# Patient Record
Sex: FEMALE | Race: WHITE | Employment: PART TIME | URBAN - METROPOLITAN AREA
[De-identification: names, ages, dates, MRNs, and addresses within clinical notes are randomized per-mention and may not be internally consistent; named-entity substitution may affect disease eponyms.]

---

## 2017-01-18 ENCOUNTER — IMMUNIZATION (OUTPATIENT)
Dept: FAMILY MEDICINE CLINIC | Facility: CLINIC | Age: 72
End: 2017-01-18

## 2017-01-18 DIAGNOSIS — Z23 NEED FOR VACCINATION: Primary | ICD-10-CM

## 2017-01-18 PROCEDURE — G0008 ADMIN INFLUENZA VIRUS VAC: HCPCS | Performed by: FAMILY MEDICINE

## 2017-01-18 PROCEDURE — 90662 IIV NO PRSV INCREASED AG IM: CPT | Performed by: FAMILY MEDICINE

## 2017-02-21 ENCOUNTER — OFFICE VISIT (OUTPATIENT)
Dept: FAMILY MEDICINE CLINIC | Facility: CLINIC | Age: 72
End: 2017-02-21

## 2017-02-21 VITALS
SYSTOLIC BLOOD PRESSURE: 122 MMHG | RESPIRATION RATE: 16 BRPM | HEART RATE: 81 BPM | BODY MASS INDEX: 22.49 KG/M2 | DIASTOLIC BLOOD PRESSURE: 76 MMHG | WEIGHT: 135 LBS | TEMPERATURE: 98 F | HEIGHT: 65 IN

## 2017-02-21 DIAGNOSIS — Z23 NEED FOR HEPATITIS A IMMUNIZATION: ICD-10-CM

## 2017-02-21 DIAGNOSIS — Z71.84 TRAVEL ADVICE ENCOUNTER: Primary | ICD-10-CM

## 2017-02-21 DIAGNOSIS — Z86.018 S/P EXCISION OF ACOUSTIC NEUROMA: ICD-10-CM

## 2017-02-21 DIAGNOSIS — Z98.890 S/P EXCISION OF ACOUSTIC NEUROMA: ICD-10-CM

## 2017-02-21 PROCEDURE — 90632 HEPA VACCINE ADULT IM: CPT | Performed by: FAMILY MEDICINE

## 2017-02-21 PROCEDURE — 99214 OFFICE O/P EST MOD 30 MIN: CPT | Performed by: FAMILY MEDICINE

## 2017-02-21 PROCEDURE — 90471 IMMUNIZATION ADMIN: CPT | Performed by: FAMILY MEDICINE

## 2017-02-21 RX ORDER — ONDANSETRON HYDROCHLORIDE 8 MG/1
8 TABLET, FILM COATED ORAL EVERY 8 HOURS PRN
Qty: 10 TABLET | Refills: 0 | Status: SHIPPED | OUTPATIENT
Start: 2017-02-21 | End: 2017-03-03

## 2017-02-21 RX ORDER — CIPROFLOXACIN 500 MG/1
500 TABLET, FILM COATED ORAL 2 TIMES DAILY
Qty: 20 TABLET | Refills: 0 | Status: SHIPPED | OUTPATIENT
Start: 2017-02-21 | End: 2017-03-03

## 2017-02-21 RX ORDER — DIPHENOXYLATE HYDROCHLORIDE AND ATROPINE SULFATE 2.5; .025 MG/1; MG/1
2 TABLET ORAL 4 TIMES DAILY PRN
Qty: 10 TABLET | Refills: 1 | Status: SHIPPED | OUTPATIENT
Start: 2017-02-21 | End: 2017-03-03

## 2017-02-21 NOTE — PROGRESS NOTES
Hoa Lyle is a 70year old female. HPI:   A week from tomorrow leaving for [place] with daughter and family and will be there for ten days. Feels well. Got Hep A shot number two as well today.  Wants to travel with antibiotics and antidiarrheals and Smoker                   Packs/Day: 0.00  Years:         Smokeless Status: Never Used                        Comment: quit 30 years ago    Alcohol Use: Yes                Comment: 1 x week        REVIEW OF SYSTEMS:   GENERAL HEALTH: feels well otherwise an

## 2017-02-26 NOTE — PATIENT INSTRUCTIONS
Gustine, you were seen for travel physical and medication for the trip to [place] as well as your second Hep A shot. Please be careful on the trip and bring your own medications as well as over the counter meds you will need.   You got your second Hep A sh · medicines to treat cancer  · steroid medicines like prednisone or cortisone  What if I miss a dose? This does not apply. Where should I keep my medicine? This drug is given in a hospital or clinic and will not be stored at home.   What should I tell my If you have a long ride ahead, buy snacks before the trip. This will help you avoid the high-fat food choices on the road.  Some good snack options include:  · Pre-washed fruit (apples, pears, bananas, or grapes)  · Whole-grain crackers or pretzels  · Whole The [place] virus is active in [place] and certain other parts of the world. If you travel to areas where [place] is currently active, you are at risk for a Zika infection.  There is no vaccine to prevent Zika infection, so you’ll need to take steps to prot · Buy permethrin. This is a type of insecticide you can apply to fabrics. If you are going camping, treat your clothing, shoes, and your tent with permethrin. You can also buy gear that’s already treated. Don’t use permethrin directly on your skin.   · Pack · Use condoms or don’t have sex for at least 6 months after your symptoms start if you are a man, or 8 weeks if you are a woman. · If your partner is pregnant, use protection every time you have vaginal, oral, or anal sex.  Or don’t have sex during the pre · You may use acetaminophen or ibuprofen to control fever, unless another medicine was prescribed. If you have chronic liver or kidney disease or have ever had a stomach ulcer or gastrointestinal  bleeding, talk with the doctor before using these medicines Call your healthcare provider if any of these occur:  · Severe constant pain in the lower part of the abdomen, on the right side  · Blood in diarrhea or vomit, dark coffee ground appearing vomit, or dark tarry stools  · continued vomiting (unable to keep l

## 2017-03-14 RX ORDER — ALENDRONATE SODIUM 35 MG/1
TABLET ORAL
Qty: 12 TABLET | Refills: 0 | Status: SHIPPED | OUTPATIENT
Start: 2017-03-14 | End: 2017-03-31

## 2017-03-14 RX ORDER — ATORVASTATIN CALCIUM 10 MG/1
TABLET, FILM COATED ORAL
Qty: 30 TABLET | Refills: 0 | Status: SHIPPED | OUTPATIENT
Start: 2017-03-14 | End: 2017-05-23

## 2017-03-14 RX ORDER — LEVOTHYROXINE SODIUM 88 UG/1
TABLET ORAL
Qty: 90 TABLET | Refills: 0 | Status: SHIPPED | OUTPATIENT
Start: 2017-03-14 | End: 2017-06-08

## 2017-03-16 ENCOUNTER — HOSPITAL ENCOUNTER (OUTPATIENT)
Dept: MAMMOGRAPHY | Facility: HOSPITAL | Age: 72
Discharge: HOME | End: 2017-03-16
Attending: INTERNAL MEDICINE
Payer: MEDICARE

## 2017-03-16 DIAGNOSIS — C50.911 MALIGNANT NEOPLASM OF RIGHT FEMALE BREAST, UNSPECIFIED SITE OF BREAST: ICD-10-CM

## 2017-03-16 PROCEDURE — 77062 BREAST TOMOSYNTHESIS BI: CPT

## 2017-03-16 PROCEDURE — 76642 ULTRASOUND BREAST LIMITED: CPT

## 2017-03-16 PROCEDURE — 77066 DX MAMMO INCL CAD BI: CPT

## 2017-03-31 ENCOUNTER — OFFICE VISIT (OUTPATIENT)
Dept: HEMATOLOGY/ONCOLOGY | Facility: HOSPITAL | Age: 72
End: 2017-03-31
Attending: INTERNAL MEDICINE
Payer: MEDICARE

## 2017-03-31 VITALS
SYSTOLIC BLOOD PRESSURE: 133 MMHG | WEIGHT: 134.5 LBS | HEART RATE: 61 BPM | RESPIRATION RATE: 18 BRPM | BODY MASS INDEX: 22.41 KG/M2 | DIASTOLIC BLOOD PRESSURE: 63 MMHG | HEIGHT: 65 IN | TEMPERATURE: 97 F | OXYGEN SATURATION: 97 %

## 2017-03-31 DIAGNOSIS — C50.911 MALIGNANT NEOPLASM OF RIGHT FEMALE BREAST, UNSPECIFIED SITE OF BREAST: Primary | ICD-10-CM

## 2017-03-31 PROCEDURE — 99213 OFFICE O/P EST LOW 20 MIN: CPT | Performed by: INTERNAL MEDICINE

## 2017-03-31 NOTE — PROGRESS NOTES
[facility] Progress Note      Patient Name:  Kellee Dumas  YOB: 1945  Medical Record Number:  [id number]    Encounter date: 3/31/2017    CHIEF COMPLAINT: Stage I right breast carcinoma status post lumpectomy     HPI:     The pa ONCE A WEEK IN THE MORNING WITH A FULL GLASS OF WATER, 30 MINUTES BEFORE A MEAL OR BEVERAGE. REMAIN UPRIGHT Disp: 12 tablet Rfl: 1   Multiple Minerals-Vitamins Oral Chew Tab Chew 1 tablet by mouth daily.  Disp:  Rfl:    Cholecalciferol (VITAMIN D) 2000 UNI di Gamboa M.D.     THE [facility]    [address], [address]    3/31/2017

## 2017-03-31 NOTE — PROGRESS NOTES
Pt here for 1 year MD f/u for hx of RB ca. Mammogram one 3/6/17. States she feels well and has no concerns at this time.

## 2017-04-19 ENCOUNTER — TELEPHONE (OUTPATIENT)
Dept: FAMILY MEDICINE CLINIC | Facility: CLINIC | Age: 72
End: 2017-04-19

## 2017-04-19 NOTE — TELEPHONE ENCOUNTER
Received authorization for release of records for last three years from [employer] on behalf of [address] [place] [address]. Faxed to [employer] for processing.

## 2017-04-24 ENCOUNTER — TELEPHONE (OUTPATIENT)
Dept: FAMILY MEDICINE CLINIC | Facility: CLINIC | Age: 72
End: 2017-04-24

## 2017-04-24 DIAGNOSIS — R89.9 ABNORMAL LABORATORY TEST RESULT: Primary | ICD-10-CM

## 2017-04-24 NOTE — TELEPHONE ENCOUNTER
----- Message from Jess Zuluaga DO sent at 4/21/2017 12:13 PM CDT -----  Please call pt with normal results of all labs for lipids, CBC, thyroid labs but CMP with increase in CO2, protein, globulin in blood--she just got back from [place] recently I bel

## 2017-05-18 ENCOUNTER — OFFICE VISIT (OUTPATIENT)
Dept: FAMILY MEDICINE CLINIC | Facility: CLINIC | Age: 72
End: 2017-05-18

## 2017-05-18 VITALS
DIASTOLIC BLOOD PRESSURE: 78 MMHG | TEMPERATURE: 98 F | RESPIRATION RATE: 16 BRPM | HEIGHT: 65 IN | WEIGHT: 136 LBS | SYSTOLIC BLOOD PRESSURE: 128 MMHG | HEART RATE: 62 BPM | OXYGEN SATURATION: 99 % | BODY MASS INDEX: 22.66 KG/M2

## 2017-05-18 DIAGNOSIS — R77.9 ELEVATED BLOOD PROTEIN: ICD-10-CM

## 2017-05-18 DIAGNOSIS — I10 WELL-CONTROLLED HYPERTENSION: Primary | ICD-10-CM

## 2017-05-18 DIAGNOSIS — E03.9 ACQUIRED HYPOTHYROIDISM: ICD-10-CM

## 2017-05-18 DIAGNOSIS — Z86.018 S/P EXCISION OF ACOUSTIC NEUROMA: ICD-10-CM

## 2017-05-18 DIAGNOSIS — C50.911 MALIGNANT NEOPLASM OF RIGHT FEMALE BREAST, UNSPECIFIED ESTROGEN RECEPTOR STATUS, UNSPECIFIED SITE OF BREAST (HCC): Chronic | ICD-10-CM

## 2017-05-18 DIAGNOSIS — R77.1 ELEVATED SERUM GLOBULIN LEVEL: ICD-10-CM

## 2017-05-18 DIAGNOSIS — I35.0 NONRHEUMATIC AORTIC VALVE STENOSIS: ICD-10-CM

## 2017-05-18 DIAGNOSIS — Z98.890 S/P EXCISION OF ACOUSTIC NEUROMA: ICD-10-CM

## 2017-05-18 PROCEDURE — 99214 OFFICE O/P EST MOD 30 MIN: CPT | Performed by: FAMILY MEDICINE

## 2017-05-18 NOTE — PATIENT INSTRUCTIONS
High Blood Pressure, New, Begin Treatment  Your blood pressure was high enough today to start treatment with medicines. Often health care providers don’t know what causes high blood pressure (hypertension).  But it can be controlled with lifestyle changes · Don’t take medicines that have heart stimulants. This includes many cold and sinus decongestant pills and sprays, as well as diet pills. Check the warnings about hypertension on the label.  Stimulants such as amphetamine or cocaine could be lethal for len © 9287-2562 [address], [address] [place]. All rights reserved. This information is not intended as a substitute for professional medical care. Always follow your healthcare professional's instructions.

## 2017-05-18 NOTE — PROGRESS NOTES
HPI:   Kena Mercer is a 70year old female presents for recheck of her hypertension and hypothyroidism. Pt has been taking medications as instructed, no medication side effects, home BP monitoring in the range of 459'Z systolic and 88'E diastolic.   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 Labs from 4/20/17 also reviewed face to face.    Diet and exercise explained and enco

## 2017-05-23 RX ORDER — ATORVASTATIN CALCIUM 10 MG/1
TABLET, FILM COATED ORAL
Qty: 30 TABLET | Refills: 5 | Status: SHIPPED | OUTPATIENT
Start: 2017-05-23 | End: 2017-11-30

## 2017-05-30 ENCOUNTER — OFFICE VISIT (OUTPATIENT)
Dept: DERMATOLOGY CLINIC | Facility: CLINIC | Age: 72
End: 2017-05-30

## 2017-05-30 DIAGNOSIS — D23.30 BENIGN NEOPLASM OF SKIN OF FACE: ICD-10-CM

## 2017-05-30 DIAGNOSIS — D23.4 BENIGN NEOPLASM OF SCALP AND SKIN OF NECK: ICD-10-CM

## 2017-05-30 DIAGNOSIS — L82.1 SEBORRHEIC KERATOSES: ICD-10-CM

## 2017-05-30 DIAGNOSIS — D48.5 NEOPLASM OF UNCERTAIN BEHAVIOR OF SKIN: Primary | ICD-10-CM

## 2017-05-30 DIAGNOSIS — L57.8 SUN-DAMAGED SKIN: ICD-10-CM

## 2017-05-30 DIAGNOSIS — D23.70 BENIGN NEOPLASM OF SKIN OF LOWER LIMB, INCLUDING HIP, UNSPECIFIED LATERALITY: ICD-10-CM

## 2017-05-30 DIAGNOSIS — L81.4 SOLAR LENTIGO: ICD-10-CM

## 2017-05-30 DIAGNOSIS — Q82.5 CONGENITAL NEVUS OF LEFT THIGH: ICD-10-CM

## 2017-05-30 DIAGNOSIS — D23.60 BENIGN NEOPLASM OF SKIN OF UPPER LIMB, INCLUDING SHOULDER, UNSPECIFIED LATERALITY: ICD-10-CM

## 2017-05-30 PROCEDURE — 11100 BIOPSY OF SKIN LESION: CPT | Performed by: DERMATOLOGY

## 2017-05-30 PROCEDURE — 99202 OFFICE O/P NEW SF 15 MIN: CPT | Performed by: DERMATOLOGY

## 2017-05-30 PROCEDURE — 88305 TISSUE EXAM BY PATHOLOGIST: CPT | Performed by: DERMATOLOGY

## 2017-05-30 RX ORDER — FLUOCINONIDE 0.5 MG/G
OINTMENT TOPICAL
Qty: 60 G | Refills: 2 | Status: SHIPPED | OUTPATIENT
Start: 2017-05-30 | End: 2017-06-01

## 2017-05-30 NOTE — PROGRESS NOTES
HPI:     Chief Complaint     Derm Problem        HPI     Derm Problem    Additional comments: New patient presents for first full body check. Lesion to nose x few months. Bleeding when aggravated. No personal or family hx of skin ca.         Last edited by Diagnosis Date   • HYPERTENSION    • OTHER DISEASES      THROID DISORDERS   • OTHER DISEASES      EAR DISORDERS   • Personal history of colonic polyps 2007   • Other ill-defined conditions(799.89)      Estrogens   • Functional murmur    • Benign neoplasm • Ovarian Cancer Sister 64   • Breast Cancer Self 72       PHYSICAL EXAM:   Physical Exam  A complete body exam was performed, including face, neck, chest , back, abdomen, r upper extremity, l upper extremity, buttocks, genital area, l lower extremity an shoulder, unspecified laterality  Benign neoplasm of skin of lower limb, including hip, unspecified laterality      Orders Placed This Encounter  BIOPSY OF SKIN LESION  Specimen to Pathology, Tissue [IHP Pt to [facility] lab]    Results From Past 48 Hours:  No

## 2017-06-01 ENCOUNTER — TELEPHONE (OUTPATIENT)
Dept: DERMATOLOGY CLINIC | Facility: CLINIC | Age: 72
End: 2017-06-01

## 2017-06-05 NOTE — PROGRESS NOTES
Quick Note:    Pt returned telephone call. Pt informed per LS S that 5/30/2017 derm path report result did reveal a BCC to her left nasal rim. Pt informed that [address] is the most common and least worrisome type of skin cancer.  Pt informed because of the locat

## 2017-06-08 RX ORDER — LOSARTAN POTASSIUM AND HYDROCHLOROTHIAZIDE 12.5; 5 MG/1; MG/1
TABLET ORAL
Qty: 90 TABLET | Refills: 0 | Status: SHIPPED | OUTPATIENT
Start: 2017-06-08 | End: 2017-09-06

## 2017-06-08 RX ORDER — LEVOTHYROXINE SODIUM 88 UG/1
TABLET ORAL
Qty: 90 TABLET | Refills: 0 | Status: SHIPPED | OUTPATIENT
Start: 2017-06-08 | End: 2017-09-06

## 2017-06-08 NOTE — TELEPHONE ENCOUNTER
LMOM for patient to call office in regards to refill request received from pharmacy-no identifying information on voicemail.

## 2017-06-08 NOTE — TELEPHONE ENCOUNTER
Please call pt and let her know she has a pending CMP lab she was supposed to redo in April, medications Losartan and Levothyroxine were sent to the pharmacy.

## 2017-06-23 NOTE — PROGRESS NOTES
Quick Note:    Please call pt with stable CMP results and repeat as directed in 6 to 12 months--- Dr. Michelle Carl  ______

## 2017-06-28 ENCOUNTER — TELEPHONE (OUTPATIENT)
Dept: FAMILY MEDICINE CLINIC | Facility: CLINIC | Age: 72
End: 2017-06-28

## 2017-06-28 DIAGNOSIS — I10 WELL-CONTROLLED HYPERTENSION: Primary | ICD-10-CM

## 2017-06-28 NOTE — TELEPHONE ENCOUNTER
----- Message from [facility], DO sent at 6/23/2017  2:36 PM CDT -----  Please call pt with stable CMP results and repeat as directed in 6 to 12 months---  Dr. Mckenzie Sunny

## 2017-09-06 RX ORDER — LEVOTHYROXINE SODIUM 88 UG/1
TABLET ORAL
Qty: 90 TABLET | Refills: 0 | Status: SHIPPED | OUTPATIENT
Start: 2017-09-06 | End: 2017-11-30

## 2017-09-06 RX ORDER — LOSARTAN POTASSIUM AND HYDROCHLOROTHIAZIDE 12.5; 5 MG/1; MG/1
TABLET ORAL
Qty: 90 TABLET | Refills: 0 | Status: SHIPPED | OUTPATIENT
Start: 2017-09-06 | End: 2017-11-30

## 2017-09-21 ENCOUNTER — OFFICE VISIT (OUTPATIENT)
Dept: SURGERY | Facility: CLINIC | Age: 72
End: 2017-09-21

## 2017-09-21 DIAGNOSIS — D48.5 NEOPLASM OF UNCERTAIN BEHAVIOR OF SKIN: Primary | ICD-10-CM

## 2017-09-21 PROCEDURE — G0463 HOSPITAL OUTPT CLINIC VISIT: HCPCS | Performed by: PLASTIC SURGERY

## 2017-09-21 PROCEDURE — 99203 OFFICE O/P NEW LOW 30 MIN: CPT | Performed by: PLASTIC SURGERY

## 2017-09-21 RX ORDER — HYDROCODONE BITARTRATE AND ACETAMINOPHEN 7.5; 325 MG/1; MG/1
1 TABLET ORAL
Qty: 10 TABLET | Refills: 0 | Status: SHIPPED | OUTPATIENT
Start: 2017-09-21 | End: 2017-11-21

## 2017-09-21 NOTE — H&P
Kellee Dumas is a 70year old female that presents with Patient presents with:  Lesion: L leg  .     REFERRED BY:  Henny Santo    Pacemaker: No  Latex Allergy: no  Coumadin: No  Plavix: No  Other anticoagulants: No  Cardiac stents: No    HAND DOMINANCE Multiple Minerals-Vitamins Oral Chew Tab Chew 1 tablet by mouth daily. Disp:  Rfl:    Cholecalciferol (VITAMIN D) 2000 UNITS Oral Cap Take 4,000 Units by mouth daily. Disp:  Rfl:    Calcium 500 MG Oral Chew Tab Chew  by mouth daily.  Disp:  Rfl:    Cinda Flores 0.50 Packs/day  For 8.00 Years     Quit date: 9/21/1980    Smokeless tobacco: Never Used    Comment: quit 30 years ago    Alcohol use Yes    Comment: 3 X week    Drug use: No    Sexual activity: Not on file     Other Topics Concern    Caffeine Concern No treatment options. WIDE EXCISION: treatment requires wide excision. A scar will result, which will be permanent. The scar will be considerably longer than the size of the lesion, as we need to excise margins around the tumor.   Further excision may be

## 2017-09-21 NOTE — PROGRESS NOTES
Pt request for surgery signed by pt and witnessed and signed by RN. Prescription for Norco and narcotic hand-out instruction sheet given to and reviewed w/patient. Pre-Surgical Instruction Handout given to and reviewed w/pt.   All questions and concerns a

## 2017-11-09 ENCOUNTER — OFFICE VISIT (OUTPATIENT)
Dept: DERMATOLOGY CLINIC | Facility: CLINIC | Age: 72
End: 2017-11-09

## 2017-11-09 ENCOUNTER — TELEPHONE (OUTPATIENT)
Dept: SURGERY | Facility: CLINIC | Age: 72
End: 2017-11-09

## 2017-11-09 DIAGNOSIS — D48.5 NEOPLASM OF UNCERTAIN BEHAVIOR OF SKIN: Primary | ICD-10-CM

## 2017-11-09 DIAGNOSIS — Z85.828 PERSONAL HISTORY OF SKIN CANCER: Primary | ICD-10-CM

## 2017-11-09 DIAGNOSIS — D23.30 BENIGN NEOPLASM OF SKIN OF FACE: ICD-10-CM

## 2017-11-09 PROCEDURE — 99212 OFFICE O/P EST SF 10 MIN: CPT | Performed by: DERMATOLOGY

## 2017-11-09 PROCEDURE — G0463 HOSPITAL OUTPT CLINIC VISIT: HCPCS | Performed by: DERMATOLOGY

## 2017-11-09 NOTE — PROGRESS NOTES
HPI:     Chief Complaint     Derm Problem        HPI     Derm Problem    Additional comments: LOV 05/30/17 pt was seen for full body check pt had Bx of skin lesion to her nose done at the time pt here today for follow up on her nose [place]       Last edited b cell carcinoma of skin) 2017    left nasal rim   • Benign neoplasm of cranial nerves     Neurofibroma of the acoustic nerve   • Breast cancer ([facility] [address] 2011    Invasive ductal and lobular CA   • Disorder of thyroid    • Functional murmur    • High blood pressu Right Handed Yes    Currently spends a great deal of time in the sun No    Hx of Spending [place] [employer] of Time in [place] Yes    Bad sunburns in the past Yes    Tanning Salons in the Past No    Hx of Radiation Treatments Yes     Social History Narrative   None o

## 2017-11-09 NOTE — TELEPHONE ENCOUNTER
Pt is scheduled for surgery on 01/12/18  Insurance is Medicare/[employer] [employer] [place]    Thank you!

## 2017-11-23 NOTE — TELEPHONE ENCOUNTER
Please call pt with stable CMP results except for mildly elevated glucose and repeat cmp, Vit D levels in 6 mos with codes for hyperglycemia and low vit D and order please as directed---  Dr. Oren Dickersont

## 2017-11-28 DIAGNOSIS — R73.9 HYPERGLYCEMIA: ICD-10-CM

## 2017-11-28 DIAGNOSIS — E55.9 VITAMIN D DEFICIENCY: Primary | ICD-10-CM

## 2017-11-30 ENCOUNTER — OFFICE VISIT (OUTPATIENT)
Dept: FAMILY MEDICINE CLINIC | Facility: CLINIC | Age: 72
End: 2017-11-30

## 2017-11-30 VITALS
SYSTOLIC BLOOD PRESSURE: 122 MMHG | DIASTOLIC BLOOD PRESSURE: 70 MMHG | RESPIRATION RATE: 16 BRPM | OXYGEN SATURATION: 99 % | WEIGHT: 137 LBS | HEART RATE: 76 BPM | HEIGHT: 65 IN | BODY MASS INDEX: 22.82 KG/M2 | TEMPERATURE: 99 F

## 2017-11-30 DIAGNOSIS — Z76.0 MEDICATION REFILL: ICD-10-CM

## 2017-11-30 DIAGNOSIS — Z23 NEED FOR VACCINATION: ICD-10-CM

## 2017-11-30 DIAGNOSIS — Z00.00 ENCOUNTER FOR ANNUAL HEALTH EXAMINATION: Primary | ICD-10-CM

## 2017-11-30 DIAGNOSIS — Z71.82 EXERCISE COUNSELING: ICD-10-CM

## 2017-11-30 DIAGNOSIS — Z23 NEED FOR VACCINATION AGAINST STREPTOCOCCUS PNEUMONIAE: ICD-10-CM

## 2017-11-30 DIAGNOSIS — I10 WELL-CONTROLLED HYPERTENSION: ICD-10-CM

## 2017-11-30 DIAGNOSIS — E55.9 VITAMIN D DEFICIENCY: ICD-10-CM

## 2017-11-30 DIAGNOSIS — E03.9 ACQUIRED HYPOTHYROIDISM: ICD-10-CM

## 2017-11-30 DIAGNOSIS — M81.0 AGE RELATED OSTEOPOROSIS, UNSPECIFIED PATHOLOGICAL FRACTURE PRESENCE: ICD-10-CM

## 2017-11-30 DIAGNOSIS — Z71.3 DIETARY COUNSELING: ICD-10-CM

## 2017-11-30 DIAGNOSIS — Z98.890 S/P EXCISION OF ACOUSTIC NEUROMA: ICD-10-CM

## 2017-11-30 DIAGNOSIS — Z86.018 S/P EXCISION OF ACOUSTIC NEUROMA: ICD-10-CM

## 2017-11-30 DIAGNOSIS — E78.5 DYSLIPIDEMIA: ICD-10-CM

## 2017-11-30 PROCEDURE — G0009 ADMIN PNEUMOCOCCAL VACCINE: HCPCS | Performed by: FAMILY MEDICINE

## 2017-11-30 PROCEDURE — G0008 ADMIN INFLUENZA VIRUS VAC: HCPCS | Performed by: FAMILY MEDICINE

## 2017-11-30 PROCEDURE — 90653 IIV ADJUVANT VACCINE IM: CPT | Performed by: FAMILY MEDICINE

## 2017-11-30 PROCEDURE — 90670 PCV13 VACCINE IM: CPT | Performed by: FAMILY MEDICINE

## 2017-11-30 PROCEDURE — 99212 OFFICE O/P EST SF 10 MIN: CPT | Performed by: FAMILY MEDICINE

## 2017-11-30 PROCEDURE — G0439 PPPS, SUBSEQ VISIT: HCPCS | Performed by: FAMILY MEDICINE

## 2017-11-30 RX ORDER — ALENDRONATE SODIUM 35 MG/1
TABLET ORAL
Qty: 12 TABLET | Refills: 1 | Status: SHIPPED | OUTPATIENT
Start: 2017-11-30 | End: 2018-05-31 | Stop reason: ALTCHOICE

## 2017-11-30 RX ORDER — LOSARTAN POTASSIUM AND HYDROCHLOROTHIAZIDE 12.5; 5 MG/1; MG/1
TABLET ORAL
Qty: 90 TABLET | Refills: 1 | Status: SHIPPED | OUTPATIENT
Start: 2017-11-30 | End: 2018-05-31

## 2017-11-30 RX ORDER — LEVOTHYROXINE SODIUM 88 UG/1
TABLET ORAL
Qty: 90 TABLET | Refills: 1 | Status: SHIPPED | OUTPATIENT
Start: 2017-11-30 | End: 2018-05-31

## 2017-11-30 RX ORDER — ATORVASTATIN CALCIUM 10 MG/1
TABLET, FILM COATED ORAL
Qty: 90 TABLET | Refills: 1 | Status: SHIPPED | OUTPATIENT
Start: 2017-11-30 | End: 2018-05-31

## 2017-11-30 NOTE — PATIENT INSTRUCTIONS
Romina Delgado's SCREENING SCHEDULE   Tests on this list are recommended by your physician but may not be covered, or covered at this frequency, by your insurer. Please check with your insurance carrier before scheduling to verify coverage.    PREVENTATI cigarettes in their lifetime   • Anyone with a family history    Colorectal Cancer Screening  Covered up to Age 76     Colonoscopy Screen   Covered every 10 years- more often if abnormal Colonoscopy,5 Years due on 05/01/2017 Update Health Maintenance if ap ANTIG   Orders placed or performed in visit on 01/19/16  -INFLUENZA VIRUS VACCINE, PRESERV FREE, >=1YEARS OF AGE    Please get every year    Pneumococcal 13 (Prevnar)  Covered Once after 65 No orders found for this or any previous visit.  Please get once a information from the [address] [place] [address] regarding Advance Directives. Romina Delgado's SCREENING SCHEDULE   Tests on this list are recommended by your physician but may not be covered, or covered at this frequency, by your insurer.  Please ch annually for Diabetics, people with Glaucoma family history,   Americans over age 48   Americans over age 72 See eye doctor annually    Bone Density Screening     Bone density screening   Covered every 2 yrs after age 72    Covered yearly fo [employer]. http://[email or web address] [place]/public/books/advin.htm  A link to the [employer]. This site has a lot of good information including definitions of the different types of Advance Directives.  It als

## 2017-11-30 NOTE — PROGRESS NOTES
HPI:   Rossi Morales is a 67year old female who presents for a Medicare Subsequent Annual Wellness visit (Pt already had Initial Annual Wellness). Preventative questions reviewed with pt and recorded.   She sees dentist and eye doctors; healthy diet depressed, or hopeless (over the last two weeks)?: Not at all  PHQ-2 SCORE: 0     Advanced Directive:   She does have a Living Will but we do NOT have it on file in [address] [place] [address] Rd.    The patient has this document but we do not have it in [facility], and patient is instruc daily.   mupirocin 2 % External Ointment    LOSARTAN POTASSIUM-HCTZ 50-12.5 MG Oral Tab TAKE ONE TABLET BY MOUTH ONCE DAILY   LEVOTHYROXINE SODIUM 88 MCG Oral Tab TAKE ONE TABLET BY MOUTH ONCE DAILY BEFORE BREAKFAST   Fluocinonide 0.05 % External Cream Kit She has never used smokeless tobacco. She reports that she drinks alcohol. She reports that she does not use drugs.      REVIEW OF SYSTEMS:   A comprehensive review of systems was negative except for: Ears, nose, mouth, throat, and face: positive for histor Lymph nodes: Cervical, supraclavicular, and axillary nodes normal   Neurologic: Normal         Vaccination History   Immunization History   Administered Date(s) Administered   • >=9 YRS AFLURIA TRI PRESERV FREE SINGLE DOSE (44565) FLU CLINIC 01/19/2016 NIGHTLY  -     Alendronate Sodium 35 MG Oral Tab; TAKE ONE TABLET BY MOUTH ONCE A WEEK IN THE MORNING WITH A FULL GLASS OF WATER, 30 MINUTES BEFORE A MEAL OR BEVERAGE.   REMAIN UPRIGHT  -     Levothyroxine Sodium 88 MCG Oral Tab; TAKE ONE TABLET BY MOUTH ON ----------  GLUCOSE (mg/dL)   Date Value   11/21/2017 104 (H)   ----------       Cardiovascular Disease Screening     LDL Annually LDL Cholesterol Calc (mg/dL)   Date Value   02/27/2012 129 (H)     LDL-CHOLESTEROL (mg/dL (calc))   Date Value   04/20/2017 TDaP Not covered by Medicare Part B No vaccine history found This may be covered with your prescription benefits, but Medicare does not cover unless Medically needed    Zoster  Not covered by Medicare Part B No vaccine history found This may be covered wit

## 2018-01-04 ENCOUNTER — TELEPHONE (OUTPATIENT)
Dept: SURGERY | Facility: CLINIC | Age: 73
End: 2018-01-04

## 2018-01-04 RX ORDER — HYDROCODONE BITARTRATE AND ACETAMINOPHEN 7.5; 325 MG/1; MG/1
1 TABLET ORAL EVERY 6 HOURS PRN
Refills: 0 | COMMUNITY
Start: 2017-12-06 | End: 2018-02-05

## 2018-01-04 NOTE — TELEPHONE ENCOUNTER
Spoke with patient. All changes to medications and allergies, per patient report, have been documented in the medical record.      Patient  has not been ill, hospitalized, or had any surgical procedures since last seen in our office on 9/21/17    Dr. Adal Santos

## 2018-01-04 NOTE — H&P
H&P UPDATE:      Pacemaker: No  Latex Allergy: no  Coumadin: No  Plavix: No  Other anticoagulants: No  Cardiac stents: No    HAND DOMINANCE:  Right  Profession: retired    RECONSTRUCTIVE HISTORY    SUN EXPOSURE   Current no   Past yes   Sunburns yes   Tann Neurofibroma of the acoustic nerve   • Breast cancer ([facility] [address] 2011    Invasive ductal and lobular CA   • Disorder of thyroid    • Functional murmur    • High blood pressure    • HYPERTENSION    • Osteopenia    • OTHER DISEASES     THROID DISORDERS   • OTHER Hx of Spending [place] Time in [place] Yes    Bad sunburns in the past Yes    Tanning Salons in the Past No    Hx of Radiation Treatments Yes     Social History Narrative   None on file     Family History   Problem Relation Age of Onset   • Cancer [place] Other anticoagulants: No  Cardiac stents: No     HAND DOMINANCE:      Right  Profession: retired     RECONSTRUCTIVE HISTORY     SUN EXPOSURE                Current no                Past       yes                Sunburns yes                Tanning salons c ALENDRONATE SODIUM 35 MG Oral Tab TAKE ONE TABLET BY MOUTH ONCE A WEEK IN THE MORNING WITH A FULL GLASS OF WATER, 30 MINUTES BEFORE A MEAL OR BEVERAGE.   REMAIN UPRIGHT Disp: 12 tablet Rfl: 1   Multiple Minerals-Vitamins Oral Chew Tab Chew 1 tablet by mouth Social History      Social History  Social History    Marital status:     Spouse name: N/A     Years of education: N/A   Number of children: N/A      Occupational History  None on file      Social History Main Topics                             Claudia Left foot dorsalis pedis 1+ posterior tibial absent     Left distal lateral thigh 5 x 3 cm macular light and dark brown speckled irregularly bordered non-ulcerated lesion.   Moderate lax skin.     ASSESSMENT/PLAN:      IMPRESSION:     SUSPICIOUS LESION  PER Orders Placed      None   Medication Changes         Hydrocodone-Acetaminophen 7.5-325 MG 1 tablet Oral EVERY 4 TO 6 HOURS PRN      Medication List   Visit Diagnoses         Neoplasm of uncertain behavior of skin D48.5      Problem List

## 2018-01-12 ENCOUNTER — HOSPITAL DOCUMENTATION (OUTPATIENT)
Dept: SURGERY | Facility: CLINIC | Age: 73
End: 2018-01-12

## 2018-01-12 ENCOUNTER — ANESTHESIA (OUTPATIENT)
Dept: SURGERY | Facility: HOSPITAL | Age: 73
End: 2018-01-12
Payer: MEDICARE

## 2018-01-12 ENCOUNTER — HOSPITAL ENCOUNTER (OUTPATIENT)
Facility: HOSPITAL | Age: 73
Setting detail: HOSPITAL OUTPATIENT SURGERY
Discharge: HOME | End: 2018-01-12
Attending: PLASTIC SURGERY | Admitting: PLASTIC SURGERY
Payer: MEDICARE

## 2018-01-12 ENCOUNTER — ANESTHESIA EVENT (OUTPATIENT)
Dept: SURGERY | Facility: HOSPITAL | Age: 73
End: 2018-01-12
Payer: MEDICARE

## 2018-01-12 ENCOUNTER — SURGERY (OUTPATIENT)
Age: 73
End: 2018-01-12

## 2018-01-12 VITALS
SYSTOLIC BLOOD PRESSURE: 131 MMHG | HEART RATE: 53 BPM | WEIGHT: 136 LBS | RESPIRATION RATE: 16 BRPM | DIASTOLIC BLOOD PRESSURE: 64 MMHG | HEIGHT: 65 IN | TEMPERATURE: 97 F | BODY MASS INDEX: 22.66 KG/M2 | OXYGEN SATURATION: 96 %

## 2018-01-12 DIAGNOSIS — D48.5 NEOPLASM OF UNCERTAIN BEHAVIOR OF SKIN: ICD-10-CM

## 2018-01-12 DIAGNOSIS — Q82.5 CONGENITAL NEVUS: ICD-10-CM

## 2018-01-12 DIAGNOSIS — Q82.5 CONGENITAL NEVUS OF LEFT THIGH: Primary | ICD-10-CM

## 2018-01-12 DIAGNOSIS — D48.5 NEOPLASM OF UNCERTAIN BEHAVIOR OF SKIN: Primary | ICD-10-CM

## 2018-01-12 PROCEDURE — 14302 TIS TRNFR ADDL 30 SQ CM: CPT | Performed by: PLASTIC SURGERY

## 2018-01-12 PROCEDURE — 0HXJXZZ TRANSFER LEFT UPPER LEG SKIN, EXTERNAL APPROACH: ICD-10-PCS | Performed by: PLASTIC SURGERY

## 2018-01-12 PROCEDURE — 0HBJXZZ EXCISION OF LEFT UPPER LEG SKIN, EXTERNAL APPROACH: ICD-10-PCS | Performed by: PLASTIC SURGERY

## 2018-01-12 PROCEDURE — 14301 TIS TRNFR ANY 30.1-60 SQ CM: CPT | Performed by: PLASTIC SURGERY

## 2018-01-12 RX ORDER — GLYCOPYRROLATE 0.2 MG/ML
INJECTION, SOLUTION INTRAMUSCULAR; INTRAVENOUS AS NEEDED
Status: DISCONTINUED | OUTPATIENT
Start: 2018-01-12 | End: 2018-01-12 | Stop reason: SURG

## 2018-01-12 RX ORDER — FAMOTIDINE 20 MG/1
20 TABLET ORAL ONCE
Status: DISCONTINUED | OUTPATIENT
Start: 2018-01-12 | End: 2018-01-12 | Stop reason: HOSPADM

## 2018-01-12 RX ORDER — ONDANSETRON 2 MG/ML
4 INJECTION INTRAMUSCULAR; INTRAVENOUS ONCE AS NEEDED
Status: DISCONTINUED | OUTPATIENT
Start: 2018-01-12 | End: 2018-01-12

## 2018-01-12 RX ORDER — HYDROCODONE BITARTRATE AND ACETAMINOPHEN 5; 325 MG/1; MG/1
2 TABLET ORAL AS NEEDED
Status: DISCONTINUED | OUTPATIENT
Start: 2018-01-12 | End: 2018-01-12

## 2018-01-12 RX ORDER — MORPHINE SULFATE 2 MG/ML
2 INJECTION, SOLUTION INTRAMUSCULAR; INTRAVENOUS EVERY 10 MIN PRN
Status: DISCONTINUED | OUTPATIENT
Start: 2018-01-12 | End: 2018-01-12

## 2018-01-12 RX ORDER — ONDANSETRON 2 MG/ML
INJECTION INTRAMUSCULAR; INTRAVENOUS AS NEEDED
Status: DISCONTINUED | OUTPATIENT
Start: 2018-01-12 | End: 2018-01-12 | Stop reason: SURG

## 2018-01-12 RX ORDER — NALOXONE HYDROCHLORIDE 0.4 MG/ML
80 INJECTION, SOLUTION INTRAMUSCULAR; INTRAVENOUS; SUBCUTANEOUS AS NEEDED
Status: DISCONTINUED | OUTPATIENT
Start: 2018-01-12 | End: 2018-01-12

## 2018-01-12 RX ORDER — KETOROLAC TROMETHAMINE 15 MG/ML
INJECTION, SOLUTION INTRAMUSCULAR; INTRAVENOUS AS NEEDED
Status: DISCONTINUED | OUTPATIENT
Start: 2018-01-12 | End: 2018-01-12 | Stop reason: SURG

## 2018-01-12 RX ORDER — LIDOCAINE HYDROCHLORIDE 10 MG/ML
INJECTION, SOLUTION EPIDURAL; INFILTRATION; INTRACAUDAL; PERINEURAL AS NEEDED
Status: DISCONTINUED | OUTPATIENT
Start: 2018-01-12 | End: 2018-01-12 | Stop reason: SURG

## 2018-01-12 RX ORDER — HYDROMORPHONE HYDROCHLORIDE 1 MG/ML
0.2 INJECTION, SOLUTION INTRAMUSCULAR; INTRAVENOUS; SUBCUTANEOUS EVERY 5 MIN PRN
Status: DISCONTINUED | OUTPATIENT
Start: 2018-01-12 | End: 2018-01-12

## 2018-01-12 RX ORDER — HYDROMORPHONE HYDROCHLORIDE 1 MG/ML
0.6 INJECTION, SOLUTION INTRAMUSCULAR; INTRAVENOUS; SUBCUTANEOUS EVERY 5 MIN PRN
Status: DISCONTINUED | OUTPATIENT
Start: 2018-01-12 | End: 2018-01-12

## 2018-01-12 RX ORDER — ACETAMINOPHEN 500 MG
1000 TABLET ORAL ONCE
Status: COMPLETED | OUTPATIENT
Start: 2018-01-12 | End: 2018-01-12

## 2018-01-12 RX ORDER — HYDROMORPHONE HYDROCHLORIDE 1 MG/ML
0.4 INJECTION, SOLUTION INTRAMUSCULAR; INTRAVENOUS; SUBCUTANEOUS EVERY 5 MIN PRN
Status: DISCONTINUED | OUTPATIENT
Start: 2018-01-12 | End: 2018-01-12

## 2018-01-12 RX ORDER — METOPROLOL TARTRATE 5 MG/5ML
2.5 INJECTION INTRAVENOUS ONCE
Status: DISCONTINUED | OUTPATIENT
Start: 2018-01-12 | End: 2018-01-12

## 2018-01-12 RX ORDER — MORPHINE SULFATE 10 MG/ML
6 INJECTION, SOLUTION INTRAMUSCULAR; INTRAVENOUS EVERY 10 MIN PRN
Status: DISCONTINUED | OUTPATIENT
Start: 2018-01-12 | End: 2018-01-12

## 2018-01-12 RX ORDER — MORPHINE SULFATE 4 MG/ML
4 INJECTION, SOLUTION INTRAMUSCULAR; INTRAVENOUS EVERY 10 MIN PRN
Status: DISCONTINUED | OUTPATIENT
Start: 2018-01-12 | End: 2018-01-12

## 2018-01-12 RX ORDER — HALOPERIDOL 5 MG/ML
0.25 INJECTION INTRAMUSCULAR ONCE AS NEEDED
Status: DISCONTINUED | OUTPATIENT
Start: 2018-01-12 | End: 2018-01-12

## 2018-01-12 RX ORDER — METOCLOPRAMIDE 10 MG/1
10 TABLET ORAL ONCE
Status: DISCONTINUED | OUTPATIENT
Start: 2018-01-12 | End: 2018-01-12 | Stop reason: HOSPADM

## 2018-01-12 RX ORDER — DEXAMETHASONE SODIUM PHOSPHATE 4 MG/ML
VIAL (ML) INJECTION AS NEEDED
Status: DISCONTINUED | OUTPATIENT
Start: 2018-01-12 | End: 2018-01-12 | Stop reason: SURG

## 2018-01-12 RX ORDER — SODIUM CHLORIDE, SODIUM LACTATE, POTASSIUM CHLORIDE, CALCIUM CHLORIDE 600; 310; 30; 20 MG/100ML; MG/100ML; MG/100ML; MG/100ML
INJECTION, SOLUTION INTRAVENOUS CONTINUOUS
Status: DISCONTINUED | OUTPATIENT
Start: 2018-01-12 | End: 2018-01-12

## 2018-01-12 RX ORDER — HYDROCODONE BITARTRATE AND ACETAMINOPHEN 7.5; 325 MG/1; MG/1
1 TABLET ORAL EVERY 4 HOURS PRN
Status: DISCONTINUED | OUTPATIENT
Start: 2018-01-12 | End: 2018-01-12

## 2018-01-12 RX ORDER — HYDROCODONE BITARTRATE AND ACETAMINOPHEN 5; 325 MG/1; MG/1
1 TABLET ORAL AS NEEDED
Status: DISCONTINUED | OUTPATIENT
Start: 2018-01-12 | End: 2018-01-12

## 2018-01-12 RX ORDER — LIDOCAINE HYDROCHLORIDE AND EPINEPHRINE 5; 5 MG/ML; UG/ML
INJECTION, SOLUTION INFILTRATION; PERINEURAL AS NEEDED
Status: DISCONTINUED | OUTPATIENT
Start: 2018-01-12 | End: 2018-01-12 | Stop reason: HOSPADM

## 2018-01-12 RX ADMIN — SODIUM CHLORIDE, SODIUM LACTATE, POTASSIUM CHLORIDE, CALCIUM CHLORIDE: 600; 310; 30; 20 INJECTION, SOLUTION INTRAVENOUS at 07:30:00

## 2018-01-12 RX ADMIN — GLYCOPYRROLATE 0.2 MG: 0.2 INJECTION, SOLUTION INTRAMUSCULAR; INTRAVENOUS at 07:30:00

## 2018-01-12 RX ADMIN — KETOROLAC TROMETHAMINE 15 MG: 15 INJECTION, SOLUTION INTRAMUSCULAR; INTRAVENOUS at 09:02:00

## 2018-01-12 RX ADMIN — SODIUM CHLORIDE, SODIUM LACTATE, POTASSIUM CHLORIDE, CALCIUM CHLORIDE: 600; 310; 30; 20 INJECTION, SOLUTION INTRAVENOUS at 09:16:00

## 2018-01-12 RX ADMIN — LIDOCAINE HYDROCHLORIDE 50 MG: 10 INJECTION, SOLUTION EPIDURAL; INFILTRATION; INTRACAUDAL; PERINEURAL at 07:35:00

## 2018-01-12 RX ADMIN — DEXAMETHASONE SODIUM PHOSPHATE 4 MG: 4 MG/ML VIAL (ML) INJECTION at 07:35:00

## 2018-01-12 RX ADMIN — ONDANSETRON 4 MG: 2 INJECTION INTRAMUSCULAR; INTRAVENOUS at 07:35:00

## 2018-01-12 NOTE — INTERVAL H&P NOTE
Pre-op Diagnosis: Congenital nexus     The above referenced H&P was reviewed by Carmine Franz MD on 1/12/2018, the patient was examined and no significant changes have occurred in the patient's condition since the H&P was performed.   I discussed w

## 2018-01-12 NOTE — BRIEF OP NOTE
Pre-Operative Diagnosis: Congenital nexus      Post-Operative Diagnosis: Congenital nexus      Procedure Performed:   Procedure(s):   Wide excision lesion(s) left thigh flap reconstruction     Surgeon(s) and Role:     * Shelby Koenig MD - Zeyad

## 2018-01-12 NOTE — ANESTHESIA POSTPROCEDURE EVALUATION
Patient: Kena Mercer    Procedure Summary     Date:  01/12/18 Room / Location:  [address] [place] [address] MAIN OR 01 / [facility] MAIN OR    Anesthesia Start:  0730 Anesthesia Stop:  8842    Procedure:  EXCISION LESION LOWER EXTREMITY (Left Leg Upper) Diagnosis:  (Congenital nexus

## 2018-01-12 NOTE — ANESTHESIA PREPROCEDURE EVALUATION
Anesthesia PreOp Note    HPI:     Jazmin Aguirre is a 67year old female who presents for preoperative consultation requested by: Skye Sutherland MD    Date of Surgery: 1/12/2018    Procedure(s):  EXCISION LESION LOWER EXTREMITY  Indication: Congen Comment: IDC  1990's: MARCO NEEDLE LOCALIZATION W/ SPECIMEN 1 SITE LEFT  3/2011: NEEDLE BIOPSY RIGHT Right      Comment: Invasive ductal and lobular  2/2008: OTHER SURGICAL HISTORY      Comment: ACCUSTIC NEUROMA, Craniotomy excision  2/22/2011: OTHER SURGICA 1/10/2018   Cholecalciferol (VITAMIN D) 2000 UNITS Oral Cap Take 4,000 Units by mouth daily. Disp:  Rfl:  1/10/2018   Calcium 500 MG Oral Chew Tab Chew  by mouth daily.  Disp:  Rfl:  1/10/2018   hydrocodone-acetaminophen 7.5-325 MG Oral Tab Take 1 tablet by Former Smoker  0.50 Packs/day  For 8.00 Years     Quit date: 9/21/1980    Smokeless tobacco: Never Used    Comment: quit 30 years ago    Alcohol use Yes    Comment: occasional    Drug use: No    Sexual activity: Not on file     Other Topics Concern    Caff General  Airway:  LMA  Post-op Pain Management: IV analgesics  Plan Comments: DC  Informed Consent Plan and Risks Discussed With:  Patient  Use of Blood Products Discussed With:  Patient  Discussed plan with:  Surgeon and CRNA      I have informed Deena

## 2018-01-12 NOTE — H&P (VIEW-ONLY)
H&P UPDATE:      Pacemaker: No  Latex Allergy: no  Coumadin: No  Plavix: No  Other anticoagulants: No  Cardiac stents: No    HAND DOMINANCE:  Right  Profession: retired    RECONSTRUCTIVE HISTORY    SUN EXPOSURE   Current no   Past yes   Sunburns yes   Tann Neurofibroma of the acoustic nerve   • Breast cancer ([facility] [address] 2011    Invasive ductal and lobular CA   • Disorder of thyroid    • Functional murmur    • High blood pressure    • HYPERTENSION    • Osteopenia    • OTHER DISEASES     THROID DISORDERS   • OTHER Hx of Spending [place] [employer] of Time in [place] Yes    Bad sunburns in the past Yes    Tanning Salons in the Past No    Hx of Radiation Treatments Yes     Social History Narrative   None on file     Family History   Problem Relation Age of Onset   • Cancer [place] Other anticoagulants: No  Cardiac stents: No     HAND DOMINANCE:      Right  Profession: retired     RECONSTRUCTIVE HISTORY     SUN EXPOSURE                Current no                Past       yes                Sunburns yes                Tanning salons c ALENDRONATE SODIUM 35 MG Oral Tab TAKE ONE TABLET BY MOUTH ONCE A WEEK IN THE MORNING WITH A FULL GLASS OF WATER, 30 MINUTES BEFORE A MEAL OR BEVERAGE.   REMAIN UPRIGHT Disp: 12 tablet Rfl: 1   Multiple Minerals-Vitamins Oral Chew Tab Chew 1 tablet by mouth Social History      Social History  Social History    Marital status:     Spouse name: N/A     Years of education: N/A   Number of children: N/A      Occupational History  None on file      Social History Main Topics                             Claudia Left foot dorsalis pedis 1+ posterior tibial absent     Left distal lateral thigh 5 x 3 cm macular light and dark brown speckled irregularly bordered non-ulcerated lesion.   Moderate lax skin.     ASSESSMENT/PLAN:      IMPRESSION:     SUSPICIOUS LESION  PER Orders Placed      None   Medication Changes         Hydrocodone-Acetaminophen 7.5-325 MG 1 tablet Oral EVERY 4 TO 6 HOURS PRN      Medication List   Visit Diagnoses         Neoplasm of uncertain behavior of skin D48.5      Problem List

## 2018-01-13 ENCOUNTER — TELEPHONE (OUTPATIENT)
Dept: SURGERY | Facility: CLINIC | Age: 73
End: 2018-01-13

## 2018-01-14 NOTE — TELEPHONE ENCOUNTER
Spoke w/pt who states that she is \"doing good\", denies any complaints. Pt reminded that she can shower w/the steri-strip just not to soak it in the shower. Pt verbalized an understanding.   Pt reminded of appt w/RN on 01/23 for suture removal and post-o

## 2018-01-15 NOTE — OPERATIVE REPORT
[facility]    PATIENT'S NAME: Mya Hernandez   ATTENDING PHYSICIAN: Elo Sanchez MD   OPERATING PHYSICIAN: Elo Sanchez MD   PATIENT ACCOUNT#:   [id number]    LOCATION:  [address]  MEDICAL RECORD #:   [id number] distally to effect tension-free closure of the defect. Total defect 167 sq cm; primary defect 24+, secondary defect 143. Subcutaneous and deep dermal sutures of 2-0 Vicryl were placed. Skin edges were reapproximated with 4-0 nylon.   Steri-Strips were ap

## 2018-01-23 ENCOUNTER — NURSE ONLY (OUTPATIENT)
Dept: SURGERY | Facility: CLINIC | Age: 73
End: 2018-01-23

## 2018-01-23 DIAGNOSIS — Z48.02 ENCOUNTER FOR REMOVAL OF SUTURES: Primary | ICD-10-CM

## 2018-01-23 DIAGNOSIS — D48.5 NEOPLASM OF UNCERTAIN BEHAVIOR OF SKIN: ICD-10-CM

## 2018-01-23 PROCEDURE — G0463 HOSPITAL OUTPT CLINIC VISIT: HCPCS | Performed by: PLASTIC SURGERY

## 2018-01-23 NOTE — PROGRESS NOTES
Surgery 1: L thigh congenital nevus / flap  - Date: 18  - Days Since: 11  Pt is in the office today for Nurse visit for L thigh suture removal.  Pt identified for name and . Orders reviewed. Denies pain. Not taking analgesics.   Slight pruritis

## 2018-01-25 ENCOUNTER — TELEPHONE (OUTPATIENT)
Dept: SURGERY | Facility: CLINIC | Age: 73
End: 2018-01-25

## 2018-01-25 NOTE — TELEPHONE ENCOUNTER
Called pt per Dr. Gabrielle Wayne' request and informed pt that the lesion that was surgically removed showed very early pre-cancerous changes and that it was completely removed and no further surgery needed. Pt verbalized an understanding.   Pt instructed to c

## 2018-02-01 ENCOUNTER — TELEPHONE (OUTPATIENT)
Dept: HEMATOLOGY/ONCOLOGY | Facility: HOSPITAL | Age: 73
End: 2018-02-01

## 2018-02-01 DIAGNOSIS — C50.911 MALIGNANT NEOPLASM OF RIGHT FEMALE BREAST, UNSPECIFIED ESTROGEN RECEPTOR STATUS, UNSPECIFIED SITE OF BREAST (HCC): Primary | ICD-10-CM

## 2018-02-05 ENCOUNTER — OFFICE VISIT (OUTPATIENT)
Dept: SURGERY | Facility: CLINIC | Age: 73
End: 2018-02-05

## 2018-02-05 DIAGNOSIS — D48.5 NEOPLASM OF UNCERTAIN BEHAVIOR OF SKIN: Primary | ICD-10-CM

## 2018-02-05 PROCEDURE — G0463 HOSPITAL OUTPT CLINIC VISIT: HCPCS | Performed by: PLASTIC SURGERY

## 2018-02-05 PROCEDURE — 99024 POSTOP FOLLOW-UP VISIT: CPT | Performed by: PLASTIC SURGERY

## 2018-02-05 NOTE — PROGRESS NOTES
Surgery 1: L thigh congenital nevus / flap  - Date: 01/12/18  - Days Since: 24    Pt here for post-op visit. Pt denies c/o pain and use of analgesics. Pt is performing Eucerin massages. Scar is healing well. No complaints. No pain.   She is pleased w

## 2018-02-15 ENCOUNTER — TELEPHONE (OUTPATIENT)
Dept: HEMATOLOGY/ONCOLOGY | Facility: HOSPITAL | Age: 73
End: 2018-02-15

## 2018-02-15 ENCOUNTER — HOSPITAL ENCOUNTER (OUTPATIENT)
Dept: MAMMOGRAPHY | Facility: HOSPITAL | Age: 73
Discharge: HOME | End: 2018-02-15
Attending: INTERNAL MEDICINE
Payer: MEDICARE

## 2018-02-15 DIAGNOSIS — C50.911 MALIGNANT NEOPLASM OF RIGHT FEMALE BREAST, UNSPECIFIED ESTROGEN RECEPTOR STATUS, UNSPECIFIED SITE OF BREAST (HCC): ICD-10-CM

## 2018-02-15 PROCEDURE — 77062 BREAST TOMOSYNTHESIS BI: CPT | Performed by: INTERNAL MEDICINE

## 2018-02-15 PROCEDURE — 77066 DX MAMMO INCL CAD BI: CPT | Performed by: INTERNAL MEDICINE

## 2018-03-16 ENCOUNTER — APPOINTMENT (OUTPATIENT)
Dept: HEMATOLOGY/ONCOLOGY | Facility: HOSPITAL | Age: 73
End: 2018-03-16
Attending: INTERNAL MEDICINE
Payer: MEDICARE

## 2018-04-11 ENCOUNTER — OFFICE VISIT (OUTPATIENT)
Dept: HEMATOLOGY/ONCOLOGY | Facility: HOSPITAL | Age: 73
End: 2018-04-11
Attending: INTERNAL MEDICINE
Payer: MEDICARE

## 2018-04-11 VITALS
SYSTOLIC BLOOD PRESSURE: 145 MMHG | HEART RATE: 66 BPM | TEMPERATURE: 98 F | OXYGEN SATURATION: 97 % | RESPIRATION RATE: 18 BRPM | BODY MASS INDEX: 23.82 KG/M2 | WEIGHT: 143 LBS | DIASTOLIC BLOOD PRESSURE: 71 MMHG | HEIGHT: 65 IN

## 2018-04-11 DIAGNOSIS — C50.911 MALIGNANT NEOPLASM OF RIGHT FEMALE BREAST, UNSPECIFIED ESTROGEN RECEPTOR STATUS, UNSPECIFIED SITE OF BREAST (HCC): Primary | ICD-10-CM

## 2018-04-11 PROCEDURE — 99213 OFFICE O/P EST LOW 20 MIN: CPT | Performed by: INTERNAL MEDICINE

## 2018-04-11 NOTE — PROGRESS NOTES
[facility] Progress Note      Patient Name:  Jignesh Ball  YOB: 1945  Medical Record Number:  [id number]    Encounter date: 4/11/2018    CHIEF COMPLAINT: Stage I right breast carcinoma status post lumpectomy     HPI:      67 ye ONCE A WEEK IN THE MORNING WITH A FULL GLASS OF WATER, 30 MINUTES BEFORE A MEAL OR BEVERAGE.   REMAIN UPRIGHT  Takes it saturdays ) Disp: 12 tablet Rfl: 1   Levothyroxine Sodium 88 MCG Oral Tab TAKE ONE TABLET BY MOUTH ONCE DAILY BEFORE BREAKFAST Disp: 90 t No results found for this or any previous visit (from the past 48 hour(s)). ASSESSMENT AND PLAN:     # Stage I right breast carcinoma:  status post lumpectomy (T1N0M0). Romina is doing well. She is due for bilateral mammogram in 3/17.  She has no s

## 2018-04-11 NOTE — PROGRESS NOTES
Patient here for 1 year follow up. Patient had mammogram on 2/15, which was okay. Patient denies anything new going on. Patient instructed to change into a gown.     Education Record    Learner:  Patient    Disease / Diagnosis: Breast    Barriers / Jd Arzola

## 2018-05-10 ENCOUNTER — OFFICE VISIT (OUTPATIENT)
Dept: DERMATOLOGY CLINIC | Facility: CLINIC | Age: 73
End: 2018-05-10

## 2018-05-10 DIAGNOSIS — D23.60 BENIGN NEOPLASM OF SKIN OF UPPER LIMB, INCLUDING SHOULDER, UNSPECIFIED LATERALITY: ICD-10-CM

## 2018-05-10 DIAGNOSIS — D23.70 BENIGN NEOPLASM OF SKIN OF LOWER LIMB, INCLUDING HIP, UNSPECIFIED LATERALITY: ICD-10-CM

## 2018-05-10 DIAGNOSIS — L57.8 SUN-DAMAGED SKIN: ICD-10-CM

## 2018-05-10 DIAGNOSIS — L81.4 SOLAR LENTIGO: ICD-10-CM

## 2018-05-10 DIAGNOSIS — D23.30 BENIGN NEOPLASM OF SKIN OF FACE: ICD-10-CM

## 2018-05-10 DIAGNOSIS — L82.1 SEBORRHEIC KERATOSES: ICD-10-CM

## 2018-05-10 DIAGNOSIS — D23.4 BENIGN NEOPLASM OF SCALP AND SKIN OF NECK: ICD-10-CM

## 2018-05-10 DIAGNOSIS — Z85.828 PERSONAL HISTORY OF SKIN CANCER: Primary | ICD-10-CM

## 2018-05-10 DIAGNOSIS — D23.5 BENIGN NEOPLASM OF SKIN OF TRUNK, EXCEPT SCROTUM: ICD-10-CM

## 2018-05-10 PROCEDURE — G0463 HOSPITAL OUTPT CLINIC VISIT: HCPCS | Performed by: DERMATOLOGY

## 2018-05-10 PROCEDURE — 99213 OFFICE O/P EST LOW 20 MIN: CPT | Performed by: DERMATOLOGY

## 2018-05-10 NOTE — PROGRESS NOTES
HPI:     Chief Complaint     Lesion        HPI     Lesion    Additional comments: Medicare pt with hx of lesions. Here today for full body check.  Hx of [facility] [employer]       Last edited by Melinda Cartwright, [address] on 5/10/2018  2:07 PM. (History)        Note patient did ha Tab Chew 1 tablet by mouth daily. Disp:  Rfl:    loratadine (CLARITIN) 10 MG Oral Tab Take 10 mg by mouth daily as needed. Disp: 30 tablet Rfl: 11   Cholecalciferol (VITAMIN D) 2000 UNITS Oral Cap Take 4,000 Units by mouth daily.  Disp:  Rfl:    Calcium 500 RIGHT  01/12/2018: SKIN TISSUE REARRANGEMENT Left      Comment: Wide excision lesion, left thigh, flap                reconstruction  1964: TONSILLECTOMY    Social History  Social History   Marital status:    Spouse name: N/A    Years of education: N there is a small flesh-colored barely 2-1/2 mm papule on the left side of the nose. Patient states she has had this without change for many years  - there are some cherry red papules  - there are several brown stuck on keratoses.         ASSESSMENT/PLAN:

## 2018-05-22 ENCOUNTER — TELEPHONE (OUTPATIENT)
Dept: FAMILY MEDICINE CLINIC | Facility: CLINIC | Age: 73
End: 2018-05-22

## 2018-05-22 DIAGNOSIS — E55.9 VITAMIN D INSUFFICIENCY: ICD-10-CM

## 2018-05-22 DIAGNOSIS — I10 HYPERTENSION, UNSPECIFIED TYPE: Primary | ICD-10-CM

## 2018-05-22 DIAGNOSIS — E89.0 HYPOTHYROIDISM, POSTRADIOIODINE THERAPY: ICD-10-CM

## 2018-05-22 DIAGNOSIS — D64.9 ANEMIA, UNSPECIFIED TYPE: ICD-10-CM

## 2018-05-22 DIAGNOSIS — Z13.220 ENCOUNTER FOR SCREENING FOR LIPID DISORDER: ICD-10-CM

## 2018-05-22 DIAGNOSIS — M85.80 OSTEOPENIA, UNSPECIFIED LOCATION: ICD-10-CM

## 2018-05-22 PROBLEM — L57.8 SUN-DAMAGED SKIN: Status: RESOLVED | Noted: 2017-05-30 | Resolved: 2018-05-22

## 2018-05-22 NOTE — TELEPHONE ENCOUNTER
Requesting Labs prior to OV   LOV: 11/30/17  RTC: 6 months   Last Relevant Labs: CMP 11/21/17, CBC/ Thyroid/ LIPID 4/2017       Future Appointments  Date Time Provider Med Singh   5/31/2018 9:15 AM Horacio Ibarra MD [facility] [address] [facility] [address]   11/6

## 2018-05-22 NOTE — TELEPHONE ENCOUNTER
Patient states she was told to go to [facility] for vit B, she went there and they had no orders. She would like new orders faxed to [phone] so she can have this done prior to her appt on 5/3/18, please advise.

## 2018-05-31 ENCOUNTER — OFFICE VISIT (OUTPATIENT)
Dept: FAMILY MEDICINE CLINIC | Facility: CLINIC | Age: 73
End: 2018-05-31

## 2018-05-31 ENCOUNTER — TELEPHONE (OUTPATIENT)
Dept: FAMILY MEDICINE CLINIC | Facility: CLINIC | Age: 73
End: 2018-05-31

## 2018-05-31 VITALS
HEART RATE: 64 BPM | BODY MASS INDEX: 23.16 KG/M2 | SYSTOLIC BLOOD PRESSURE: 130 MMHG | WEIGHT: 139 LBS | TEMPERATURE: 98 F | DIASTOLIC BLOOD PRESSURE: 70 MMHG | RESPIRATION RATE: 16 BRPM | HEIGHT: 65 IN

## 2018-05-31 DIAGNOSIS — Z02.89 ENCOUNTER FOR COMPLETION OF FORM WITH PATIENT: Primary | ICD-10-CM

## 2018-05-31 DIAGNOSIS — M85.89 OSTEOPENIA OF MULTIPLE SITES: ICD-10-CM

## 2018-05-31 DIAGNOSIS — I10 BENIGN ESSENTIAL HTN: ICD-10-CM

## 2018-05-31 DIAGNOSIS — I35.0 NONRHEUMATIC AORTIC VALVE STENOSIS: ICD-10-CM

## 2018-05-31 DIAGNOSIS — E03.9 ACQUIRED HYPOTHYROIDISM: ICD-10-CM

## 2018-05-31 DIAGNOSIS — E78.5 DYSLIPIDEMIA: ICD-10-CM

## 2018-05-31 PROCEDURE — 99214 OFFICE O/P EST MOD 30 MIN: CPT | Performed by: FAMILY MEDICINE

## 2018-05-31 RX ORDER — LEVOTHYROXINE SODIUM 88 UG/1
TABLET ORAL
Qty: 90 TABLET | Refills: 3 | Status: SHIPPED | OUTPATIENT
Start: 2018-05-31 | End: 2019-04-29

## 2018-05-31 RX ORDER — ATORVASTATIN CALCIUM 10 MG/1
TABLET, FILM COATED ORAL
Qty: 90 TABLET | Refills: 3 | Status: SHIPPED | OUTPATIENT
Start: 2018-05-31 | End: 2019-07-11

## 2018-05-31 RX ORDER — METOPROLOL SUCCINATE 25 MG/1
25 TABLET, EXTENDED RELEASE ORAL DAILY
Qty: 90 TABLET | Refills: 3 | Status: SHIPPED | OUTPATIENT
Start: 2018-05-31 | End: 2019-05-24

## 2018-05-31 RX ORDER — LOSARTAN POTASSIUM AND HYDROCHLOROTHIAZIDE 12.5; 5 MG/1; MG/1
TABLET ORAL
Qty: 90 TABLET | Refills: 3 | Status: SHIPPED | OUTPATIENT
Start: 2018-05-31 | End: 2019-07-11

## 2018-05-31 NOTE — TELEPHONE ENCOUNTER
Form for [employer] completed by MD and faxed to Janel Ventura at [phone] and confirmed. Copy to green triage folder.

## 2018-05-31 NOTE — PROGRESS NOTES
[facility] Visit Note  5/31/2018      Subjective:      Patient ID: Loreta Hernandez is a 67year old female. Chief Complaint  Patient presents with:  Establish Care: Former Jamila Shirley patient.  Request med refills  Lab Results: 05/24/18 results wheezes, rales, or rhonchi, normal respiratory effort  Abd:  +bowel sounds, soft  Ext:  No pedal edema,  Pedal pulses 2+ B        Assessment:     1. Encounter for completion of form with patient    2.  Osteopenia of multiple sites  - XR DEXA BONE DENSITOMET

## 2018-06-04 ENCOUNTER — HOSPITAL ENCOUNTER (OUTPATIENT)
Dept: BONE DENSITY | Age: 73
Discharge: HOME | End: 2018-06-04
Attending: FAMILY MEDICINE
Payer: MEDICARE

## 2018-06-04 DIAGNOSIS — M85.89 OSTEOPENIA OF MULTIPLE SITES: ICD-10-CM

## 2018-06-04 PROCEDURE — 77080 DXA BONE DENSITY AXIAL: CPT | Performed by: FAMILY MEDICINE

## 2018-06-22 PROBLEM — H91.91 DEAFNESS IN RIGHT EAR: Status: ACTIVE | Noted: 2018-06-22

## 2018-07-23 ENCOUNTER — TELEPHONE (OUTPATIENT)
Dept: FAMILY MEDICINE CLINIC | Facility: CLINIC | Age: 73
End: 2018-07-23

## 2018-07-23 ENCOUNTER — OFFICE VISIT (OUTPATIENT)
Dept: FAMILY MEDICINE CLINIC | Facility: CLINIC | Age: 73
End: 2018-07-23
Payer: MEDICARE

## 2018-07-23 VITALS — HEIGHT: 65 IN | BODY MASS INDEX: 23.32 KG/M2 | WEIGHT: 140 LBS | RESPIRATION RATE: 16 BRPM | TEMPERATURE: 98 F

## 2018-07-23 DIAGNOSIS — L23.7 ALLERGIC CONTACT DERMATITIS DUE TO PLANTS, EXCEPT FOOD: Primary | ICD-10-CM

## 2018-07-23 PROCEDURE — 99213 OFFICE O/P EST LOW 20 MIN: CPT | Performed by: FAMILY MEDICINE

## 2018-07-23 RX ORDER — TRIAMCINOLONE ACETONIDE 0.25 MG/G
1 CREAM TOPICAL 2 TIMES DAILY PRN
Qty: 80 G | Refills: 0 | Status: SHIPPED | OUTPATIENT
Start: 2018-07-23 | End: 2018-10-24 | Stop reason: ALTCHOICE

## 2018-07-23 RX ORDER — HYDROXYZINE HYDROCHLORIDE 25 MG/1
25 TABLET, FILM COATED ORAL 3 TIMES DAILY PRN
Qty: 30 TABLET | Refills: 0 | Status: SHIPPED | OUTPATIENT
Start: 2018-07-23 | End: 2018-10-24 | Stop reason: ALTCHOICE

## 2018-07-23 RX ORDER — PREDNISONE 2.5 MG
TABLET ORAL
Refills: 0 | COMMUNITY
Start: 2018-07-20 | End: 2018-10-24 | Stop reason: ALTCHOICE

## 2018-07-23 NOTE — TELEPHONE ENCOUNTER
triamcinolone acetonide 0.025 % External Cream 80 g 0 7/23/2018    Sig :  Apply 1 Units topically 2 (two) times daily as needed.  To affected areas of skin     Route:   Topical     Order #:   [id number]       Pharmacy wanted to know if patient was a female o

## 2018-07-23 NOTE — PROGRESS NOTES
[facility] Visit Note  7/23/2018      Subjective:      Patient ID: Sofi Leblanc is a 67year old female. Chief Complaint:  Patient presents with:  Rash Skin Problem (integumentary):  Went blueberry picking last wk Tuesday, then the following Don't drive if it makes you sleepy. Dispense: 30 tablet; Refill: 0  - triamcinolone acetonide 0.025 % External Cream; Apply 1 Units topically 2 (two) times daily as needed.  To affected areas of skin  Dispense: 80 g; Refill: 0  -cont prednisone until gone,

## 2018-10-24 ENCOUNTER — OFFICE VISIT (OUTPATIENT)
Dept: FAMILY MEDICINE CLINIC | Facility: CLINIC | Age: 73
End: 2018-10-24
Payer: MEDICARE

## 2018-10-24 VITALS
HEART RATE: 68 BPM | BODY MASS INDEX: 22.66 KG/M2 | HEIGHT: 65 IN | SYSTOLIC BLOOD PRESSURE: 130 MMHG | DIASTOLIC BLOOD PRESSURE: 70 MMHG | WEIGHT: 136 LBS | RESPIRATION RATE: 16 BRPM | TEMPERATURE: 97 F

## 2018-10-24 DIAGNOSIS — D33.3 RIGHT ACOUSTIC NEUROMA (HCC): ICD-10-CM

## 2018-10-24 DIAGNOSIS — B02.9 HERPES ZOSTER WITHOUT COMPLICATION: Primary | ICD-10-CM

## 2018-10-24 DIAGNOSIS — Z28.21 INFLUENZA VACCINATION DECLINED BY PATIENT: ICD-10-CM

## 2018-10-24 PROCEDURE — 99213 OFFICE O/P EST LOW 20 MIN: CPT | Performed by: FAMILY MEDICINE

## 2018-10-24 RX ORDER — MELATONIN 3 MG
1 LOZENGE ORAL
COMMUNITY
End: 2018-10-24 | Stop reason: CLARIF

## 2018-10-24 RX ORDER — GABAPENTIN 300 MG/1
300 CAPSULE ORAL NIGHTLY
Qty: 90 CAPSULE | Refills: 0 | Status: SHIPPED | OUTPATIENT
Start: 2018-10-24 | End: 2018-12-03

## 2018-10-24 RX ORDER — CHOLECALCIFEROL (VITAMIN D3) 125 MCG
1 CAPSULE ORAL NIGHTLY
COMMUNITY

## 2018-10-24 RX ORDER — VALACYCLOVIR HYDROCHLORIDE 1 G/1
1 TABLET, FILM COATED ORAL EVERY 12 HOURS SCHEDULED
Qty: 14 TABLET | Refills: 0 | Status: SHIPPED | OUTPATIENT
Start: 2018-10-24 | End: 2019-02-14

## 2018-10-24 NOTE — PATIENT INSTRUCTIONS
Shingles  Shingles is a viral infection caused by the same virus that causes chicken pox. Anyone who has had chicken pox may get shingles later in life. The virus stays in the body, but remains asleep (dormant).  Shingles often occurs in older persons or · Trim fingernails and try not to scratch. Scratching the sores may leave scars. · Stay home from work or school until all blisters have formed a crust and you are no longer contagious.   Follow-up care  Follow up with your healthcare provider, or as direc · The first sign of shingles is usually pain, burning, tingling, or itching on one part of your face or body. You may also feel as if you have the flu, with fever and chills. · A red rash with small blisters appears within a few days.  The rash may appear · Bacterial infection. Shingles blisters may become infected with bacteria. Antibiotic medicine is used to treat the infection. · Eye problems. A person with shingles on the face should see his or her healthcare provider right away.  Shingles can cause ser

## 2018-10-24 NOTE — PROGRESS NOTES
[facility] Visit Note  10/24/2018      Subjective:      Patient ID: Rossi Morales is a 67year old female. Chief Complaint:  Patient presents with:  Rash: c/o red rash right side of face & mouth x 5 days.   Bump: c/o bumps on roof of her mout Take 1 tablet (1,000 mg total) by mouth every 12 (twelve) hours for 7 days. Dispense: 14 tablet; Refill: 0  - gabapentin 300 MG Oral Cap; Take 1 capsule (300 mg total) by mouth nightly. For pain  Dispense: 90 capsule;  Refill: 0  - benadryl/lidocaine/mylan

## 2018-11-06 ENCOUNTER — OFFICE VISIT (OUTPATIENT)
Dept: DERMATOLOGY CLINIC | Facility: CLINIC | Age: 73
End: 2018-11-06
Payer: MEDICARE

## 2018-11-06 DIAGNOSIS — L57.8 SUN-DAMAGED SKIN: ICD-10-CM

## 2018-11-06 DIAGNOSIS — D23.5 BENIGN NEOPLASM OF SKIN OF TRUNK, EXCEPT SCROTUM: ICD-10-CM

## 2018-11-06 DIAGNOSIS — D48.5 NEOPLASM OF UNCERTAIN BEHAVIOR OF SKIN: Primary | ICD-10-CM

## 2018-11-06 DIAGNOSIS — D23.4 BENIGN NEOPLASM OF SCALP AND SKIN OF NECK: ICD-10-CM

## 2018-11-06 DIAGNOSIS — L81.4 SOLAR LENTIGO: ICD-10-CM

## 2018-11-06 DIAGNOSIS — D23.60 BENIGN NEOPLASM OF SKIN OF UPPER LIMB, INCLUDING SHOULDER, UNSPECIFIED LATERALITY: ICD-10-CM

## 2018-11-06 DIAGNOSIS — L82.1 SEBORRHEIC KERATOSES: ICD-10-CM

## 2018-11-06 DIAGNOSIS — D23.30 BENIGN NEOPLASM OF SKIN OF FACE: ICD-10-CM

## 2018-11-06 DIAGNOSIS — D23.70 BENIGN NEOPLASM OF SKIN OF LOWER LIMB, INCLUDING HIP, UNSPECIFIED LATERALITY: ICD-10-CM

## 2018-11-06 DIAGNOSIS — Z85.828 PERSONAL HISTORY OF SKIN CANCER: ICD-10-CM

## 2018-11-06 PROCEDURE — 11100 BIOPSY OF SKIN LESION: CPT | Performed by: DERMATOLOGY

## 2018-11-06 PROCEDURE — 88305 TISSUE EXAM BY PATHOLOGIST: CPT | Performed by: DERMATOLOGY

## 2018-11-06 PROCEDURE — 99213 OFFICE O/P EST LOW 20 MIN: CPT | Performed by: DERMATOLOGY

## 2018-11-06 NOTE — PROGRESS NOTES
Past Medical History:   Diagnosis Date   • BCC (basal cell carcinoma of skin) 2017    left nasal rim   • Breast cancer ([facility] [address] 2011    Invasive ductal and lobular CA, R   • Essential hypertension    • Functional murmur     very mild aortic stenosis, mild TR, thigh, flap reconstruction   • TONSILLECTOMY  1964     Social History    Socioeconomic History      Marital status:        Spouse name: Not on file      Number of children: Not on file      Years of education: Not on file      Highest education level Treatments: Yes        Regular use of sun block: Not Asked    Social History Narrative      Not on file    Family History   Problem Relation Age of Onset   • Cancer Father         lung cancer   • Cancer Paternal Grandfather         esophagus cancer   • Can

## 2018-11-06 NOTE — PROGRESS NOTES
HPI:     Chief Complaint     Lesion        HPI     Lesion      Additional comments: LOV: 05/10/18. Pt presents with lesions of concern from scalp to waist, pt requests an upper body exam. Pt has personal hx of BCC.            Last edited by Can Bell, Minerals-Vitamins Oral Chew Tab Chew 1 tablet by mouth daily. Disp:  Rfl:    Cholecalciferol (VITAMIN D) 2000 UNITS Oral Cap Take 4,000 Units by mouth daily. Disp:  Rfl:    Calcium 500 MG Oral Chew Tab Chew  by mouth daily.  Disp:  Rfl:      Allergies:   No OTHER SURGICAL HISTORY  1981    thyroid surgery, 1981-radioactive iodine ablation   • OTHER SURGICAL HISTORY  Nov. 2013    abutment placed in right skull for hearing loss   • RADIATION RIGHT  2011   • SKIN TISSUE REARRANGEMENT Left 01/12/2018    Wide excis Handed: Yes        Currently spends a great deal of time in the sun: No        Past Sunlamp Treatments for Acne: Not Asked        Hx of Spending [place] of Time in Sun: Yes        Bad sunburns in the past: Yes        Tanning Salons in the Past: No changing  Sun-damaged skin-proper use and application of broad-spectrum sunblock SPF 30 or higher discussed. Patient understands to put it on  15-20 minutes before outsides so that  it is absorbed and working and to reapply it every few hours.     Seborrhe

## 2018-12-03 ENCOUNTER — OFFICE VISIT (OUTPATIENT)
Dept: FAMILY MEDICINE CLINIC | Facility: CLINIC | Age: 73
End: 2018-12-03
Payer: MEDICARE

## 2018-12-03 VITALS
HEIGHT: 65 IN | WEIGHT: 135 LBS | DIASTOLIC BLOOD PRESSURE: 70 MMHG | BODY MASS INDEX: 22.49 KG/M2 | SYSTOLIC BLOOD PRESSURE: 130 MMHG | TEMPERATURE: 98 F | HEART RATE: 64 BPM | RESPIRATION RATE: 16 BRPM

## 2018-12-03 DIAGNOSIS — I35.0 NONRHEUMATIC AORTIC VALVE STENOSIS: ICD-10-CM

## 2018-12-03 DIAGNOSIS — Z23 NEED FOR VACCINATION: ICD-10-CM

## 2018-12-03 DIAGNOSIS — B02.9 HERPES ZOSTER WITHOUT COMPLICATION: Primary | ICD-10-CM

## 2018-12-03 DIAGNOSIS — R09.89 BRUIT OF LEFT CAROTID ARTERY: ICD-10-CM

## 2018-12-03 PROCEDURE — 90653 IIV ADJUVANT VACCINE IM: CPT | Performed by: FAMILY MEDICINE

## 2018-12-03 PROCEDURE — G0008 ADMIN INFLUENZA VIRUS VAC: HCPCS | Performed by: FAMILY MEDICINE

## 2018-12-03 PROCEDURE — 99213 OFFICE O/P EST LOW 20 MIN: CPT | Performed by: FAMILY MEDICINE

## 2018-12-03 NOTE — PROGRESS NOTES
[facility] Visit Note  12/3/2018      Subjective:      Patient ID: Janice Tracy is a 68year old female. Chief Complaint:  Patient presents with:  Shingles: here for f/u on shingles right face.  States she is doing much better, did not have appreciated of the R carotid A. Lungs:  Clear to auscultation B, no wheezes, rales, or rhonchi, normal respiratory effort  Abd:  +bowel sounds, soft  Ext:  No pedal edema,  Pedal pulses 2+ B  Skin: R cheek with few pink patches, no more scabs.     Assessme

## 2018-12-04 DIAGNOSIS — B02.9 HERPES ZOSTER WITHOUT COMPLICATION: ICD-10-CM

## 2018-12-04 RX ORDER — GABAPENTIN 300 MG/1
300 CAPSULE ORAL NIGHTLY
Qty: 90 CAPSULE | Refills: 0 | OUTPATIENT
Start: 2018-12-04

## 2018-12-11 ENCOUNTER — HOSPITAL ENCOUNTER (OUTPATIENT)
Dept: ULTRASOUND IMAGING | Facility: HOSPITAL | Age: 73
Discharge: HOME | End: 2018-12-11
Attending: FAMILY MEDICINE
Payer: MEDICARE

## 2018-12-11 DIAGNOSIS — I35.0 NONRHEUMATIC AORTIC VALVE STENOSIS: ICD-10-CM

## 2018-12-11 DIAGNOSIS — R09.89 BRUIT OF LEFT CAROTID ARTERY: ICD-10-CM

## 2018-12-11 PROCEDURE — 93880 EXTRACRANIAL BILAT STUDY: CPT | Performed by: FAMILY MEDICINE

## 2019-01-01 NOTE — LETTER
18      Patient: Juan Mckenna  : 1945 Visit date: 2018    Dear Regina Delgado,      I examined your patient in follow-up today. She is 3 weeks post left thigh congenital nevus excision with reconstruction.   The areas healing Statement Selected

## 2019-01-08 ENCOUNTER — OFFICE VISIT (OUTPATIENT)
Dept: FAMILY MEDICINE CLINIC | Facility: CLINIC | Age: 74
End: 2019-01-08
Payer: MEDICARE

## 2019-01-08 VITALS
RESPIRATION RATE: 16 BRPM | DIASTOLIC BLOOD PRESSURE: 70 MMHG | HEART RATE: 72 BPM | HEIGHT: 65 IN | BODY MASS INDEX: 22.93 KG/M2 | SYSTOLIC BLOOD PRESSURE: 124 MMHG | TEMPERATURE: 97 F | WEIGHT: 137.63 LBS

## 2019-01-08 DIAGNOSIS — Z13.31 DEPRESSION SCREENING: ICD-10-CM

## 2019-01-08 DIAGNOSIS — E78.5 DYSLIPIDEMIA: ICD-10-CM

## 2019-01-08 DIAGNOSIS — Z12.31 VISIT FOR SCREENING MAMMOGRAM: ICD-10-CM

## 2019-01-08 DIAGNOSIS — E89.0 HYPOTHYROIDISM, POSTRADIOIODINE THERAPY: ICD-10-CM

## 2019-01-08 DIAGNOSIS — M85.89 OSTEOPENIA OF MULTIPLE SITES: ICD-10-CM

## 2019-01-08 DIAGNOSIS — E55.9 VITAMIN D INSUFFICIENCY: ICD-10-CM

## 2019-01-08 DIAGNOSIS — R68.2 DRY MOUTH: ICD-10-CM

## 2019-01-08 DIAGNOSIS — Z11.59 NEED FOR HEPATITIS C SCREENING TEST: ICD-10-CM

## 2019-01-08 DIAGNOSIS — D50.9 IRON DEFICIENCY ANEMIA, UNSPECIFIED IRON DEFICIENCY ANEMIA TYPE: ICD-10-CM

## 2019-01-08 DIAGNOSIS — Z00.00 ENCOUNTER FOR ANNUAL HEALTH EXAMINATION: Primary | ICD-10-CM

## 2019-01-08 PROCEDURE — G0444 DEPRESSION SCREEN ANNUAL: HCPCS | Performed by: FAMILY MEDICINE

## 2019-01-08 PROCEDURE — G0439 PPPS, SUBSEQ VISIT: HCPCS | Performed by: FAMILY MEDICINE

## 2019-01-08 NOTE — PROGRESS NOTES
HPI:   Jazmin Aguirre is a 68year old female who presents for a Medicare Subsequent Annual Wellness visit (Pt already had Initial Annual Wellness).       Her last annual assessment has been over 1 year: Annual Physical due on 11/30/2018         Azeb weeks)?: Not at all  PHQ-2 SCORE: 0     Advanced Directive:   She does have a Living Will but we do NOT have it on file in [address] [place] [address] Rd.    The patient has this document but we do not have it in [facility], and patient is instructed to get our office a copy of it for sca CREATSERUM 0.71 05/24/2018    GLU 81 05/24/2018        CBC  (most recent labs)   Lab Results   Component Value Date    WBC 5.4 05/24/2018    HGB 12.3 05/24/2018     05/24/2018        ALLERGIES:   She has no allergies on file.     CURRENT MEDICATIONS: Surgery; cystoscopy,insert ureteral stent; skin tissue rearrangement (Left, 01/12/2018); COLONOSCOPY, POSSIBLE BIOPSY, POSSIBLE POLYPECTOMY 97346 (N/A, 5/17/2018); and EXCISION LESION LOWER EXTREMITY (Left, 1/12/2018).     Her family history includes Breast screening mammogram  -     MARCO SCREENING BILAT (CPT=77067);  Future    Need for hepatitis C screening test  -     HCV ANTIBODY    Depression screening  -     DEPRESSION SCREEN ANNUAL    Dry mouth  -advised sugar-free candy or gum, biotene mouthwash and to c (H)     LDL-CHOLESTEROL (mg/dL (calc))   Date Value   05/24/2018 67        EKG - w/ Initial Preventative Physical Exam only, or if medically necessary Electrocardiogram date11/21/2017       Colorectal Cancer Screening      Colonoscopy Screen every 10 years Toxoid  Only covered with a cut with metal- TD and TDaP Not covered by Medicare Part B No vaccine history found This may be covered with your prescription benefits, but Medicare does not cover unless Medically needed    Zoster  Not covered by Medicare Part

## 2019-01-08 NOTE — PATIENT INSTRUCTIONS
Romina Delgado's SCREENING SCHEDULE   Tests on this list are recommended by your physician but may not be covered, or covered at this frequency, by your insurer. Please check with your insurance carrier before scheduling to verify coverage.    PREVENTATI • Anyone with a family history    Colorectal Cancer Screening  Covered up to Age 76     Colonoscopy Screen   Covered every 10 years- more often if abnormal Colonoscopy due on 05/17/2028 Update [facility] if applicable    Flex Sigmoidoscopy Screen on 01/19/16   • INFLUENZA VIRUS VACCINE, PRESERV FREE, >=1YEARS OF AGE    Please get every year    Pneumococcal 13 (Prevnar)  Covered Once after 65 Orders placed or performed in visit on 11/30/17   • PNEUMOCOCCAL VACC, 13 MICK IM    Please get once after y information from the [address] [place] [address] regarding Advance Directives.

## 2019-01-10 ENCOUNTER — OFFICE VISIT (OUTPATIENT)
Dept: DERMATOLOGY CLINIC | Facility: CLINIC | Age: 74
End: 2019-01-10
Payer: MEDICARE

## 2019-01-10 DIAGNOSIS — Z85.828 PERSONAL HISTORY OF SKIN CANCER: Primary | ICD-10-CM

## 2019-01-10 DIAGNOSIS — L85.8 KERATOACANTHOMA: ICD-10-CM

## 2019-01-10 PROCEDURE — 99212 OFFICE O/P EST SF 10 MIN: CPT | Performed by: DERMATOLOGY

## 2019-01-10 PROCEDURE — G0463 HOSPITAL OUTPT CLINIC VISIT: HCPCS | Performed by: DERMATOLOGY

## 2019-01-10 NOTE — PROGRESS NOTES
Past Medical History:   Diagnosis Date   • BCC (basal cell carcinoma of skin) 2017    left nasal rim   • Breast cancer ([facility] [address] 2011    Invasive ductal and lobular CA, R   • Essential hypertension    • Functional murmur     very mild aortic stenosis, mild TR, hearing loss   • RADIATION RIGHT  2011   • SKIN TISSUE REARRANGEMENT Left 01/12/2018    Wide excision lesion, left thigh, flap reconstruction   • TONSILLECTOMY  1964     Social History    Socioeconomic History      Marital status:        Spouse name: Time in Sun: Yes        Bad sunburns in the past: Yes        Tanning Salons in the Past: No        Hx of Radiation Treatments: Yes        Regular use of sun block: Not Asked    Social History Narrative      Not on file    Family History   Problem Relation

## 2019-01-10 NOTE — PROGRESS NOTES
HPI:     Chief Complaint     Lesion        HPI     Lesion      Additional comments: LOV 11/16/2018 Patient present today to f./u on biopsy on upper under arm . Patient states sites has heal well.  Patient has personal hx of [facility]          Last edited by Cam Pascual postradioiodine therapy     was hyperthyroid   • Keratoacanthoma type squamous cell carcinoma of skin 11/2018    right upper medial arm    • Osteopenia     alendronate from 2011-5/18   • Personal history of antineoplastic chemotherapy    • Personal history file      Food insecurity - worry: Not on file      Food insecurity - inability: Not on file      Transportation needs - medical: Not on file      Transportation needs - non-medical: Not on file    Occupational History      Not on file    Tobacco Use Breast Cancer Self 65       PHYSICAL EXAM:   Patient is alert and oriented and appears their stated age. They are well-nourished. Exam is remarkable for the following-  1. There does not appear to be any recurrent keratoacanthoma.   Some whitish what favio

## 2019-01-11 DIAGNOSIS — B02.9 HERPES ZOSTER WITHOUT COMPLICATION: ICD-10-CM

## 2019-01-14 RX ORDER — GABAPENTIN 300 MG/1
300 CAPSULE ORAL NIGHTLY
Qty: 90 CAPSULE | Refills: 0 | OUTPATIENT
Start: 2019-01-14

## 2019-01-14 NOTE — TELEPHONE ENCOUNTER
Requesting: Gabapentin 300 mg capsules LOV: 01/08/19  RTC: May of August 2019  Last Relevant Labs: N/A  Filled: 10/24/19 #90 with 0 refills  Dx:  Shingles      Future Appointments   Date Time Provider Med Singh   6/4/2019  2:15 PM Zev Brian MD

## 2019-01-22 DIAGNOSIS — B02.9 HERPES ZOSTER WITHOUT COMPLICATION: ICD-10-CM

## 2019-01-22 RX ORDER — GABAPENTIN 300 MG/1
300 CAPSULE ORAL NIGHTLY
Qty: 90 CAPSULE | Refills: 0 | Status: SHIPPED | OUTPATIENT
Start: 2019-01-22 | End: 2019-02-14

## 2019-01-22 NOTE — TELEPHONE ENCOUNTER
Medication(s) to Refill:   Requested Prescriptions     Pending Prescriptions Disp Refills   • GABAPENTIN 300 MG Oral Cap [Pharmacy Med Name: GABAPENTIN   CAP 300MG CAPSULE] 90 capsule 0     Sig: TAKE 1 CAPSULE (300 MG TOTAL) BY MOUTH NIGHTLY.  FOR PAIN

## 2019-02-14 DIAGNOSIS — B02.9 HERPES ZOSTER WITHOUT COMPLICATION: ICD-10-CM

## 2019-02-15 RX ORDER — VALACYCLOVIR HYDROCHLORIDE 500 MG/1
TABLET, FILM COATED ORAL
Qty: 28 TABLET | Refills: 3 | Status: SHIPPED | OUTPATIENT
Start: 2019-02-15 | End: 2019-04-16

## 2019-02-15 RX ORDER — GABAPENTIN 300 MG/1
300 CAPSULE ORAL NIGHTLY
Qty: 90 CAPSULE | Refills: 0 | Status: SHIPPED | OUTPATIENT
Start: 2019-02-15 | End: 2019-04-16

## 2019-02-20 ENCOUNTER — HOSPITAL ENCOUNTER (OUTPATIENT)
Dept: MAMMOGRAPHY | Facility: HOSPITAL | Age: 74
Discharge: HOME | End: 2019-02-20
Attending: INTERNAL MEDICINE
Payer: MEDICARE

## 2019-02-20 DIAGNOSIS — C50.911 MALIGNANT NEOPLASM OF RIGHT FEMALE BREAST, UNSPECIFIED ESTROGEN RECEPTOR STATUS, UNSPECIFIED SITE OF BREAST (HCC): ICD-10-CM

## 2019-02-20 PROCEDURE — 77066 DX MAMMO INCL CAD BI: CPT | Performed by: INTERNAL MEDICINE

## 2019-02-20 PROCEDURE — 77062 BREAST TOMOSYNTHESIS BI: CPT | Performed by: INTERNAL MEDICINE

## 2019-04-16 ENCOUNTER — OFFICE VISIT (OUTPATIENT)
Dept: HEMATOLOGY/ONCOLOGY | Facility: HOSPITAL | Age: 74
End: 2019-04-16
Attending: INTERNAL MEDICINE
Payer: MEDICARE

## 2019-04-16 VITALS
TEMPERATURE: 98 F | DIASTOLIC BLOOD PRESSURE: 72 MMHG | SYSTOLIC BLOOD PRESSURE: 135 MMHG | RESPIRATION RATE: 18 BRPM | WEIGHT: 136.38 LBS | HEIGHT: 65 IN | OXYGEN SATURATION: 99 % | HEART RATE: 62 BPM | BODY MASS INDEX: 22.72 KG/M2

## 2019-04-16 DIAGNOSIS — M89.9 BONE DISORDER: ICD-10-CM

## 2019-04-16 DIAGNOSIS — C50.911 MALIGNANT NEOPLASM OF RIGHT FEMALE BREAST, UNSPECIFIED ESTROGEN RECEPTOR STATUS, UNSPECIFIED SITE OF BREAST (HCC): Primary | ICD-10-CM

## 2019-04-16 DIAGNOSIS — E03.9 HYPOTHYROIDISM, UNSPECIFIED TYPE: ICD-10-CM

## 2019-04-16 PROCEDURE — 99213 OFFICE O/P EST LOW 20 MIN: CPT | Performed by: INTERNAL MEDICINE

## 2019-04-16 NOTE — PROGRESS NOTES
[facility] Progress Note      Patient Name:  Bhavana Saenz  YOB: 1945  Medical Record Number:  [id number]    Encounter date: 4/16/2019    CHIEF COMPLAINT: Stage I right breast carcinoma status post lumpectomy     HPI:      68 ye tablet Rfl: 3   Levothyroxine Sodium 88 MCG Oral Tab TAKE ONE TABLET BY MOUTH ONCE DAILY BEFORE BREAKFAST Disp: 90 tablet Rfl: 3   Multiple Minerals-Vitamins Oral Chew Tab Chew 1 tablet by mouth daily.  Disp:  Rfl:    Cholecalciferol (VITAMIN D) 2000 UNITS LIPID PANEL [E]      VITAMIN D, 25-HYDROXY [E]      MARCO ALIX 2D+3D DIAGNOSTIC [facility]  BILAT (CPT=77066/54036)    Return for Labs next week. MD 1 yr. Martín Steele M.D.     Dago Porras

## 2019-04-25 ENCOUNTER — NURSE ONLY (OUTPATIENT)
Dept: HEMATOLOGY/ONCOLOGY | Facility: HOSPITAL | Age: 74
End: 2019-04-25
Attending: INTERNAL MEDICINE
Payer: MEDICARE

## 2019-04-25 DIAGNOSIS — M89.9 BONE DISORDER: ICD-10-CM

## 2019-04-25 DIAGNOSIS — C50.911 MALIGNANT NEOPLASM OF RIGHT FEMALE BREAST, UNSPECIFIED ESTROGEN RECEPTOR STATUS, UNSPECIFIED SITE OF BREAST (HCC): ICD-10-CM

## 2019-04-25 DIAGNOSIS — E03.9 HYPOTHYROIDISM, UNSPECIFIED TYPE: ICD-10-CM

## 2019-04-25 PROCEDURE — 84439 ASSAY OF FREE THYROXINE: CPT

## 2019-04-25 PROCEDURE — 80053 COMPREHEN METABOLIC PANEL: CPT

## 2019-04-25 PROCEDURE — 82306 VITAMIN D 25 HYDROXY: CPT

## 2019-04-25 PROCEDURE — 36415 COLL VENOUS BLD VENIPUNCTURE: CPT

## 2019-04-25 PROCEDURE — 80061 LIPID PANEL: CPT

## 2019-04-25 PROCEDURE — 84443 ASSAY THYROID STIM HORMONE: CPT

## 2019-04-25 PROCEDURE — 85025 COMPLETE CBC W/AUTO DIFF WBC: CPT

## 2019-04-29 DIAGNOSIS — E03.9 ACQUIRED HYPOTHYROIDISM: ICD-10-CM

## 2019-04-29 RX ORDER — LEVOTHYROXINE SODIUM 88 UG/1
TABLET ORAL
Qty: 90 TABLET | Refills: 3 | Status: SHIPPED | OUTPATIENT
Start: 2019-04-29 | End: 2020-03-18

## 2019-05-24 DIAGNOSIS — I10 BENIGN ESSENTIAL HTN: ICD-10-CM

## 2019-05-24 DIAGNOSIS — I35.0 NONRHEUMATIC AORTIC VALVE STENOSIS: ICD-10-CM

## 2019-05-24 RX ORDER — METOPROLOL SUCCINATE 25 MG/1
25 TABLET, EXTENDED RELEASE ORAL DAILY
Qty: 90 TABLET | Refills: 1 | Status: SHIPPED | OUTPATIENT
Start: 2019-05-24 | End: 2019-08-29

## 2019-05-24 NOTE — TELEPHONE ENCOUNTER
Requested Prescriptions     Pending Prescriptions Disp Refills   • METOPROLOL SUCCINATE ER 25 MG Oral Tablet 24 Hr [Pharmacy Med Name: METOPROLOL   TAB 25MG ER TABLET]       Sig: TAKE 1 TABLET (25 MG TOTAL) BY MOUTH DAILY.      LOV: 1/8/19  RTC: May OR Augu

## 2019-05-28 DIAGNOSIS — I35.0 NONRHEUMATIC AORTIC VALVE STENOSIS: ICD-10-CM

## 2019-05-28 DIAGNOSIS — I10 BENIGN ESSENTIAL HTN: ICD-10-CM

## 2019-05-28 RX ORDER — METOPROLOL SUCCINATE 25 MG/1
25 TABLET, EXTENDED RELEASE ORAL DAILY
Qty: 90 TABLET | Refills: 0 | Status: SHIPPED | OUTPATIENT
Start: 2019-05-28 | End: 2019-08-29

## 2019-06-04 ENCOUNTER — OFFICE VISIT (OUTPATIENT)
Dept: DERMATOLOGY CLINIC | Facility: CLINIC | Age: 74
End: 2019-06-04
Payer: MEDICARE

## 2019-06-04 DIAGNOSIS — Z85.828 PERSONAL HISTORY OF SKIN CANCER: ICD-10-CM

## 2019-06-04 DIAGNOSIS — L81.4 SOLAR LENTIGO: ICD-10-CM

## 2019-06-04 DIAGNOSIS — D23.30 BENIGN NEOPLASM OF SKIN OF FACE: ICD-10-CM

## 2019-06-04 DIAGNOSIS — D23.60 BENIGN NEOPLASM OF SKIN OF UPPER LIMB, INCLUDING SHOULDER, UNSPECIFIED LATERALITY: ICD-10-CM

## 2019-06-04 DIAGNOSIS — L57.0 ACTINIC KERATOSIS: Primary | ICD-10-CM

## 2019-06-04 DIAGNOSIS — D23.4 BENIGN NEOPLASM OF SCALP AND SKIN OF NECK: ICD-10-CM

## 2019-06-04 DIAGNOSIS — D23.70 BENIGN NEOPLASM OF SKIN OF LOWER LIMB, INCLUDING HIP, UNSPECIFIED LATERALITY: ICD-10-CM

## 2019-06-04 DIAGNOSIS — L82.1 SEBORRHEIC KERATOSES: ICD-10-CM

## 2019-06-04 DIAGNOSIS — L57.8 SUN-DAMAGED SKIN: ICD-10-CM

## 2019-06-04 PROCEDURE — 99213 OFFICE O/P EST LOW 20 MIN: CPT | Performed by: DERMATOLOGY

## 2019-06-04 PROCEDURE — G0463 HOSPITAL OUTPT CLINIC VISIT: HCPCS | Performed by: DERMATOLOGY

## 2019-06-04 PROCEDURE — 17000 DESTRUCT PREMALG LESION: CPT | Performed by: DERMATOLOGY

## 2019-06-04 NOTE — PROGRESS NOTES
HPI:     Chief Complaint     Full Skin Exam        HPI     Full Skin Exam      Additional comments: LOV 1/10/2019. pt presenting today with full body skin check. pt has personal HX of [address] [employer]          Last edited by Raghav Perry, [address] [place] [address] Brayden on 6/4/2019  2:05 PM. (H daily. Disp:  Rfl:    Calcium 500 MG Oral Chew Tab Chew  by mouth daily.  Disp:  Rfl:      Allergies:   No Known Allergies    Past Medical History:   Diagnosis Date   • BCC (basal cell carcinoma of skin) 2017    left nasal rim   • [facility] thyroid surgery, 1981-radioactive iodine ablation   • OTHER SURGICAL HISTORY  Nov. 2013    abutment placed in right skull for hearing loss   • RADIATION RIGHT  2011   • SKIN TISSUE REARRANGEMENT Left 01/12/2018    Wide excision lesion, left thigh, flap rec Occupational Exposure: Not Asked        Hobby Hazards: Not Asked        Sleep Concern: Not Asked        Stress Concern: Not Asked        Weight Concern: No        Special Diet: Not Asked        Back Care: Not Asked        Exercise: No        [address] left cheek. Appears to be sitting on a solar lentigo  - there are some cherry red papules  - there are many brown stuck on keratoses.         ASSESSMENT/PLAN:   Actinic keratosis  (primary encounter diagnosis)-diagnosis discussed–lesion on cheek is treated

## 2019-07-11 DIAGNOSIS — I10 BENIGN ESSENTIAL HTN: ICD-10-CM

## 2019-07-11 DIAGNOSIS — E78.5 DYSLIPIDEMIA: ICD-10-CM

## 2019-07-12 RX ORDER — ATORVASTATIN CALCIUM 10 MG/1
TABLET, FILM COATED ORAL
Qty: 90 TABLET | Refills: 3 | Status: SHIPPED | OUTPATIENT
Start: 2019-07-12 | End: 2020-09-24

## 2019-07-12 RX ORDER — LOSARTAN POTASSIUM AND HYDROCHLOROTHIAZIDE 12.5; 5 MG/1; MG/1
TABLET ORAL
Qty: 90 TABLET | Refills: 3 | Status: SHIPPED | OUTPATIENT
Start: 2019-07-12 | End: 2021-03-16

## 2019-08-29 ENCOUNTER — OFFICE VISIT (OUTPATIENT)
Dept: FAMILY MEDICINE CLINIC | Facility: CLINIC | Age: 74
End: 2019-08-29
Payer: MEDICARE

## 2019-08-29 VITALS
RESPIRATION RATE: 16 BRPM | DIASTOLIC BLOOD PRESSURE: 74 MMHG | BODY MASS INDEX: 22.66 KG/M2 | HEART RATE: 60 BPM | SYSTOLIC BLOOD PRESSURE: 124 MMHG | TEMPERATURE: 98 F | HEIGHT: 65 IN | WEIGHT: 136 LBS

## 2019-08-29 DIAGNOSIS — I35.0 NONRHEUMATIC AORTIC VALVE STENOSIS: ICD-10-CM

## 2019-08-29 DIAGNOSIS — Z85.3 HISTORY OF RIGHT BREAST CANCER: ICD-10-CM

## 2019-08-29 DIAGNOSIS — C44.311 BASAL CELL CARCINOMA OF SKIN OF NOSE: ICD-10-CM

## 2019-08-29 DIAGNOSIS — I10 BENIGN ESSENTIAL HTN: Primary | ICD-10-CM

## 2019-08-29 PROBLEM — L57.8 SUN-DAMAGED SKIN: Status: RESOLVED | Noted: 2017-05-30 | Resolved: 2019-08-29

## 2019-08-29 PROBLEM — Z85.828 PERSONAL HISTORY OF SKIN CANCER: Status: RESOLVED | Noted: 2017-11-09 | Resolved: 2019-08-29

## 2019-08-29 PROCEDURE — 99214 OFFICE O/P EST MOD 30 MIN: CPT | Performed by: FAMILY MEDICINE

## 2019-08-29 RX ORDER — METOPROLOL SUCCINATE 25 MG/1
25 TABLET, EXTENDED RELEASE ORAL DAILY
Qty: 90 TABLET | Refills: 3 | Status: SHIPPED | OUTPATIENT
Start: 2019-08-29 | End: 2020-11-20

## 2019-11-21 ENCOUNTER — OFFICE VISIT (OUTPATIENT)
Dept: DERMATOLOGY CLINIC | Facility: CLINIC | Age: 74
End: 2019-11-21
Payer: MEDICARE

## 2019-11-21 DIAGNOSIS — L57.8 SUN-DAMAGED SKIN: ICD-10-CM

## 2019-11-21 DIAGNOSIS — D23.5 BENIGN NEOPLASM OF SKIN OF TRUNK, EXCEPT SCROTUM: ICD-10-CM

## 2019-11-21 DIAGNOSIS — Z85.828 PERSONAL HISTORY OF SKIN CANCER: ICD-10-CM

## 2019-11-21 DIAGNOSIS — D23.4 BENIGN NEOPLASM OF SCALP AND SKIN OF NECK: ICD-10-CM

## 2019-11-21 DIAGNOSIS — D23.30 BENIGN NEOPLASM OF SKIN OF FACE: ICD-10-CM

## 2019-11-21 DIAGNOSIS — D48.5 NEOPLASM OF UNCERTAIN BEHAVIOR OF SKIN: Primary | ICD-10-CM

## 2019-11-21 DIAGNOSIS — D23.60 BENIGN NEOPLASM OF SKIN OF UPPER LIMB, INCLUDING SHOULDER, UNSPECIFIED LATERALITY: ICD-10-CM

## 2019-11-21 DIAGNOSIS — L82.1 SEBORRHEIC KERATOSES: ICD-10-CM

## 2019-11-21 DIAGNOSIS — L81.4 SOLAR LENTIGO: ICD-10-CM

## 2019-11-21 PROCEDURE — G0463 HOSPITAL OUTPT CLINIC VISIT: HCPCS | Performed by: DERMATOLOGY

## 2019-11-21 PROCEDURE — 88305 TISSUE EXAM BY PATHOLOGIST: CPT | Performed by: DERMATOLOGY

## 2019-11-21 PROCEDURE — 99213 OFFICE O/P EST LOW 20 MIN: CPT | Performed by: DERMATOLOGY

## 2019-11-21 PROCEDURE — 11102 TANGNTL BX SKIN SINGLE LES: CPT | Performed by: DERMATOLOGY

## 2019-11-21 NOTE — PROGRESS NOTES
HPI:     Chief Complaint     Upper Body Exam        HPI     Upper Body Exam      Additional comments: LOV 6/4/2019. Pt presenting for upper body skin exam. Pt has a personal hx of BCC removed from L nasal rim in 2017.            Last edited by Chata Perales skin) 2017    left nasal rim   • Breast cancer ([facility] [address] 2011    Invasive ductal and lobular CA, R   • Essential hypertension    • Functional murmur     very mild aortic stenosis, mild TR, last echo 2017, Dr. Jayden Kim q 2 yrs   • High cholesterol    • History REARRANGEMENT Left 01/12/2018    Wide excision lesion, left thigh, flap reconstruction   • TONSILLECTOMY  1964     Social History    Socioeconomic History      Marital status:        Spouse name: Not on file      Number of children: Not on file Diet: Not Asked        Back Care: Not Asked        Exercise: No        Bike Helmet: Not Asked        Seat Belt: Yes        Self-Exams: Not Asked        Grew up on a farm: Not Asked        History of tanning: No        Outdoor occupation: Not Asked        P rim–shave biopsy is performed- See the operative note. Postop instructions given. Further plan pending pathology. .  Personal history of skin cancer-no recurrence–patient will follow-up for full exam and 6 months. Monthly self exams.   To come sooner if n

## 2019-11-25 NOTE — PROGRESS NOTES
Patient informed of test results and all LSS' recommendations. Voiced understanding. Pt agrees to see Dr. Naveed Galindo, path routed to him.  States she will CB for appt

## 2020-01-16 ENCOUNTER — OFFICE VISIT (OUTPATIENT)
Dept: SURGERY | Facility: CLINIC | Age: 75
End: 2020-01-16
Payer: MEDICARE

## 2020-01-16 DIAGNOSIS — D48.5 NEOPLASM OF UNCERTAIN BEHAVIOR OF SKIN: Primary | ICD-10-CM

## 2020-01-16 PROCEDURE — 99214 OFFICE O/P EST MOD 30 MIN: CPT | Performed by: PLASTIC SURGERY

## 2020-01-16 PROCEDURE — G0463 HOSPITAL OUTPT CLINIC VISIT: HCPCS | Performed by: PLASTIC SURGERY

## 2020-01-16 RX ORDER — HYDROCODONE BITARTRATE AND ACETAMINOPHEN 7.5; 325 MG/1; MG/1
1 TABLET ORAL
Qty: 10 TABLET | Refills: 0 | Status: SHIPPED | OUTPATIENT
Start: 2020-01-16 | End: 2020-02-17

## 2020-01-16 NOTE — H&P (VIEW-ONLY)
Surgery 1: L thigh congenital nevus / flap  - Date: 01/12/18  - Days Since: 26    [address] [place] [address] Street is a 76year old female that presents with Patient presents with:  Lesion: Right Ear Pinna  .     REFERRED BY:  Gian Granger    1/16/18 - Discussed with path Medication Sig Dispense Refill   • Metoprolol Succinate ER 25 MG Oral Tablet 24 Hr Take 1 tablet (25 mg total) by mouth daily.  90 tablet 3   • LOSARTAN POTASSIUM-HCTZ 50-12.5 MG Oral Tab TAKE ONE TABLET BY MOUTH ONCE DAILY 90 tablet 3   • ATORVASTATIN 10 Sandra Odom MD at [facility]   • [address] Left 1/12/2018    Performed by Esthela Bhat MD at [facility] MAIN OR   • LUMPECTOMY RIGHT  4/2011    [facility]   • MARCO LOCALIZATION WIRE Minutes per session: Not on file      Stress: Not on file    Relationships      Social connections:        Talks on phone: Not on file        Gets together: Not on file        Attends Sikhism service: Not on file        Active member of club or organizat CONSTITUTIONAL: Overall appearance - Normal  HEENT: Normocephalic  EYES: Conjunctiva - Right: Normal, Left: Normal; EOMI  EARS: Inspection - Right: Normal, Left: Normal  NECK/THYROID: Inspection - Normal, Palpation - Normal, Thyroid gland - Normal, No ad

## 2020-01-16 NOTE — H&P
Surgery 1: L thigh congenital nevus / flap  - Date: 01/12/18  - Days Since: 26    [address] [place] [address] Street is a 76year old female that presents with Patient presents with:  Lesion: Right Ear Pinna  .     REFERRED BY:  Kasey Courser    1/16/18 - Discussed with path Medication Sig Dispense Refill   • Metoprolol Succinate ER 25 MG Oral Tablet 24 Hr Take 1 tablet (25 mg total) by mouth daily.  90 tablet 3   • LOSARTAN POTASSIUM-HCTZ 50-12.5 MG Oral Tab TAKE ONE TABLET BY MOUTH ONCE DAILY 90 tablet 3   • ATORVASTATIN 10 Taye Randolph MD at [facility]   • [address] Left 1/12/2018    Performed by Aide Thomas MD at [facility] MAIN OR   • LUMPECTOMY RIGHT  4/2011    [facility]   • MARCO LOCALIZATION WIRE Minutes per session: Not on file      Stress: Not on file    Relationships      Social connections:        Talks on phone: Not on file        Gets together: Not on file        Attends Taoism service: Not on file        Active member of club or organizat CONSTITUTIONAL: Overall appearance - Normal  HEENT: Normocephalic  EYES: Conjunctiva - Right: Normal, Left: Normal; EOMI  EARS: Inspection - Right: Normal, Left: Normal  NECK/THYROID: Inspection - Normal, Palpation - Normal, Thyroid gland - Normal, No ad

## 2020-01-16 NOTE — PROGRESS NOTES
Pt request for surgery signed by pt and witnessed and signed by RN. Prescription for Norco  and narcotic prescription electronically sent to pharmacy per Dr. Ramos Chain order and pt instructed to  prescription before surgery.    Pre-Surgical Instr

## 2020-01-20 ENCOUNTER — TELEPHONE (OUTPATIENT)
Dept: SURGERY | Facility: CLINIC | Age: 75
End: 2020-01-20

## 2020-01-20 DIAGNOSIS — D48.5 NEOPLASM OF UNCERTAIN BEHAVIOR OF SKIN: Primary | ICD-10-CM

## 2020-01-20 NOTE — TELEPHONE ENCOUNTER
Surgery is scheduled for 1/28/20  Insurance is Medicare/[place]  Thank you!

## 2020-01-28 ENCOUNTER — ANESTHESIA EVENT (OUTPATIENT)
Dept: SURGERY | Facility: HOSPITAL | Age: 75
End: 2020-01-28
Payer: MEDICARE

## 2020-01-28 ENCOUNTER — HOSPITAL DOCUMENTATION (OUTPATIENT)
Dept: SURGERY | Facility: CLINIC | Age: 75
End: 2020-01-28

## 2020-01-28 ENCOUNTER — ANESTHESIA (OUTPATIENT)
Dept: SURGERY | Facility: HOSPITAL | Age: 75
End: 2020-01-28
Payer: MEDICARE

## 2020-01-28 ENCOUNTER — HOSPITAL ENCOUNTER (OUTPATIENT)
Facility: HOSPITAL | Age: 75
Setting detail: HOSPITAL OUTPATIENT SURGERY
Discharge: HOME | End: 2020-01-28
Attending: PLASTIC SURGERY | Admitting: PLASTIC SURGERY
Payer: MEDICARE

## 2020-01-28 VITALS
HEIGHT: 65 IN | BODY MASS INDEX: 22.88 KG/M2 | DIASTOLIC BLOOD PRESSURE: 42 MMHG | SYSTOLIC BLOOD PRESSURE: 113 MMHG | HEART RATE: 66 BPM | WEIGHT: 137.31 LBS | OXYGEN SATURATION: 98 % | TEMPERATURE: 98 F | RESPIRATION RATE: 15 BRPM

## 2020-01-28 DIAGNOSIS — D48.5 NEOPLASM OF UNCERTAIN BEHAVIOR OF SKIN: Primary | ICD-10-CM

## 2020-01-28 PROCEDURE — 15260 FTH/GFT FR N/E/E/L 20 SQCM/<: CPT | Performed by: PLASTIC SURGERY

## 2020-01-28 PROCEDURE — 0HB2XZZ EXCISION OF RIGHT EAR SKIN, EXTERNAL APPROACH: ICD-10-PCS | Performed by: PLASTIC SURGERY

## 2020-01-28 PROCEDURE — 11442 EXC FACE-MM B9+MARG 1.1-2 CM: CPT | Performed by: PLASTIC SURGERY

## 2020-01-28 PROCEDURE — 0HR2X73 REPLACEMENT OF RIGHT EAR SKIN WITH AUTOLOGOUS TISSUE SUBSTITUTE, FULL THICKNESS, EXTERNAL APPROACH: ICD-10-PCS | Performed by: PLASTIC SURGERY

## 2020-01-28 RX ORDER — NALOXONE HYDROCHLORIDE 0.4 MG/ML
80 INJECTION, SOLUTION INTRAMUSCULAR; INTRAVENOUS; SUBCUTANEOUS AS NEEDED
Status: DISCONTINUED | OUTPATIENT
Start: 2020-01-28 | End: 2020-01-28

## 2020-01-28 RX ORDER — PROCHLORPERAZINE EDISYLATE 5 MG/ML
5 INJECTION INTRAMUSCULAR; INTRAVENOUS ONCE AS NEEDED
Status: DISCONTINUED | OUTPATIENT
Start: 2020-01-28 | End: 2020-01-28

## 2020-01-28 RX ORDER — HYDROCODONE BITARTRATE AND ACETAMINOPHEN 7.5; 325 MG/1; MG/1
1 TABLET ORAL EVERY 4 HOURS PRN
Status: DISCONTINUED | OUTPATIENT
Start: 2020-01-28 | End: 2020-01-28

## 2020-01-28 RX ORDER — HYDROMORPHONE HYDROCHLORIDE 1 MG/ML
0.2 INJECTION, SOLUTION INTRAMUSCULAR; INTRAVENOUS; SUBCUTANEOUS EVERY 5 MIN PRN
Status: DISCONTINUED | OUTPATIENT
Start: 2020-01-28 | End: 2020-01-28

## 2020-01-28 RX ORDER — DEXAMETHASONE SODIUM PHOSPHATE 4 MG/ML
VIAL (ML) INJECTION AS NEEDED
Status: DISCONTINUED | OUTPATIENT
Start: 2020-01-28 | End: 2020-01-28 | Stop reason: SURG

## 2020-01-28 RX ORDER — MORPHINE SULFATE 4 MG/ML
2 INJECTION, SOLUTION INTRAMUSCULAR; INTRAVENOUS EVERY 10 MIN PRN
Status: DISCONTINUED | OUTPATIENT
Start: 2020-01-28 | End: 2020-01-28

## 2020-01-28 RX ORDER — HYDROCODONE BITARTRATE AND ACETAMINOPHEN 5; 325 MG/1; MG/1
1 TABLET ORAL AS NEEDED
Status: DISCONTINUED | OUTPATIENT
Start: 2020-01-28 | End: 2020-01-28

## 2020-01-28 RX ORDER — ONDANSETRON 2 MG/ML
4 INJECTION INTRAMUSCULAR; INTRAVENOUS ONCE AS NEEDED
Status: DISCONTINUED | OUTPATIENT
Start: 2020-01-28 | End: 2020-01-28

## 2020-01-28 RX ORDER — HYDROMORPHONE HYDROCHLORIDE 1 MG/ML
0.4 INJECTION, SOLUTION INTRAMUSCULAR; INTRAVENOUS; SUBCUTANEOUS EVERY 5 MIN PRN
Status: DISCONTINUED | OUTPATIENT
Start: 2020-01-28 | End: 2020-01-28

## 2020-01-28 RX ORDER — HALOPERIDOL 5 MG/ML
0.25 INJECTION INTRAMUSCULAR ONCE AS NEEDED
Status: DISCONTINUED | OUTPATIENT
Start: 2020-01-28 | End: 2020-01-28

## 2020-01-28 RX ORDER — SODIUM CHLORIDE, SODIUM LACTATE, POTASSIUM CHLORIDE, CALCIUM CHLORIDE 600; 310; 30; 20 MG/100ML; MG/100ML; MG/100ML; MG/100ML
INJECTION, SOLUTION INTRAVENOUS CONTINUOUS
Status: DISCONTINUED | OUTPATIENT
Start: 2020-01-28 | End: 2020-01-28

## 2020-01-28 RX ORDER — MORPHINE SULFATE 10 MG/ML
6 INJECTION, SOLUTION INTRAMUSCULAR; INTRAVENOUS EVERY 10 MIN PRN
Status: DISCONTINUED | OUTPATIENT
Start: 2020-01-28 | End: 2020-01-28

## 2020-01-28 RX ORDER — ACETAMINOPHEN 500 MG
1000 TABLET ORAL ONCE
Status: COMPLETED | OUTPATIENT
Start: 2020-01-28 | End: 2020-01-28

## 2020-01-28 RX ORDER — EPHEDRINE SULFATE 50 MG/ML
INJECTION, SOLUTION INTRAVENOUS AS NEEDED
Status: DISCONTINUED | OUTPATIENT
Start: 2020-01-28 | End: 2020-01-28 | Stop reason: SURG

## 2020-01-28 RX ORDER — FAMOTIDINE 20 MG/1
20 TABLET ORAL ONCE
Status: DISCONTINUED | OUTPATIENT
Start: 2020-01-28 | End: 2020-01-28 | Stop reason: HOSPADM

## 2020-01-28 RX ORDER — ONDANSETRON 2 MG/ML
INJECTION INTRAMUSCULAR; INTRAVENOUS AS NEEDED
Status: DISCONTINUED | OUTPATIENT
Start: 2020-01-28 | End: 2020-01-28 | Stop reason: SURG

## 2020-01-28 RX ORDER — METOPROLOL TARTRATE 5 MG/5ML
2.5 INJECTION INTRAVENOUS ONCE
Status: DISCONTINUED | OUTPATIENT
Start: 2020-01-28 | End: 2020-01-28

## 2020-01-28 RX ORDER — HYDROCODONE BITARTRATE AND ACETAMINOPHEN 5; 325 MG/1; MG/1
2 TABLET ORAL AS NEEDED
Status: DISCONTINUED | OUTPATIENT
Start: 2020-01-28 | End: 2020-01-28

## 2020-01-28 RX ORDER — METOCLOPRAMIDE 10 MG/1
10 TABLET ORAL ONCE
Status: DISCONTINUED | OUTPATIENT
Start: 2020-01-28 | End: 2020-01-28 | Stop reason: HOSPADM

## 2020-01-28 RX ORDER — HYDROMORPHONE HYDROCHLORIDE 1 MG/ML
0.6 INJECTION, SOLUTION INTRAMUSCULAR; INTRAVENOUS; SUBCUTANEOUS EVERY 5 MIN PRN
Status: DISCONTINUED | OUTPATIENT
Start: 2020-01-28 | End: 2020-01-28

## 2020-01-28 RX ORDER — KETOROLAC TROMETHAMINE 30 MG/ML
INJECTION, SOLUTION INTRAMUSCULAR; INTRAVENOUS AS NEEDED
Status: DISCONTINUED | OUTPATIENT
Start: 2020-01-28 | End: 2020-01-28 | Stop reason: SURG

## 2020-01-28 RX ORDER — MORPHINE SULFATE 4 MG/ML
4 INJECTION, SOLUTION INTRAMUSCULAR; INTRAVENOUS EVERY 10 MIN PRN
Status: DISCONTINUED | OUTPATIENT
Start: 2020-01-28 | End: 2020-01-28

## 2020-01-28 RX ORDER — LIDOCAINE HYDROCHLORIDE AND EPINEPHRINE 5; 5 MG/ML; UG/ML
INJECTION, SOLUTION INFILTRATION; PERINEURAL AS NEEDED
Status: DISCONTINUED | OUTPATIENT
Start: 2020-01-28 | End: 2020-01-28 | Stop reason: HOSPADM

## 2020-01-28 RX ADMIN — ONDANSETRON 4 MG: 2 INJECTION INTRAMUSCULAR; INTRAVENOUS at 08:38:00

## 2020-01-28 RX ADMIN — EPHEDRINE SULFATE 5 MG: 50 INJECTION, SOLUTION INTRAVENOUS at 07:52:00

## 2020-01-28 RX ADMIN — DEXAMETHASONE SODIUM PHOSPHATE 4 MG: 4 MG/ML VIAL (ML) INJECTION at 08:38:00

## 2020-01-28 RX ADMIN — EPHEDRINE SULFATE 5 MG: 50 INJECTION, SOLUTION INTRAVENOUS at 07:55:00

## 2020-01-28 RX ADMIN — KETOROLAC TROMETHAMINE 30 MG: 30 INJECTION, SOLUTION INTRAMUSCULAR; INTRAVENOUS at 08:52:00

## 2020-01-28 NOTE — OPERATIVE REPORT
[facility]    PATIENT'S NAME: Amando Vance   ATTENDING PHYSICIAN: Mary Morales MD   OPERATING PHYSICIAN: Mary Morales MD   PATIENT ACCOUNT#:   [de-identified]    LOCATION:  [address]  MEDICAL RECORD #:   [id number] nylon.    The patient tolerated the procedure well and left the operating suite in satisfactory condition.     Dictated By Mike Christie MD  d: 01/28/2020 09:07:12  t: 01/28/2020 11:30:34  [facility] [id number]  RVJ/    cc: Mike Christie

## 2020-01-28 NOTE — INTERVAL H&P NOTE
The H&P was reviewed by me. Patient was examined and no significant changes noted in patient's condition since H&P was performed.  Discussed with patient and/or legal representative the potential benefits, risks and side effects of procedure; likelihood of

## 2020-01-28 NOTE — ANESTHESIA PREPROCEDURE EVALUATION
Anesthesia PreOp Note    HPI:     Marquez Gallardo is a 76year old female who presents for preoperative consultation requested by: Zeinab Espinosa MD    Date of Surgery: 1/28/2020    Procedure(s):  LESION WIDE EXCISION WITH SKIN GRAFT  Indication: d Dr. Holman Iba q 2 yrs   • High blood pressure    • High cholesterol    • History of postmenopausal HRT     ~ 5 yrs.     • History of shingles 10/2018    R ear/mouth, likely CN V2 distribution   • Hypothyroidism, postradioiodine therapy     was hyperthyroid lesion, left thigh, flap reconstruction   • TONSILLECTOMY  1964       cholecalciferol 1000 UNITS Oral Cap, Take 1,000 Units by mouth daily. , Disp: , Rfl: , 1/27/2020 at 2030  Metoprolol Succinate ER 25 MG Oral Tablet 24 Hr, Take 1 tablet (25 mg total) by m status:        Spouse name: Not on file      Number of children: Not on file      Years of education: Not on file      Highest education level: Not on file    Occupational History      Not on file    Social Needs      Financial resource strain: Not o Not Asked        Grew up on a farm: Not Asked        History of tanning: No        Outdoor occupation: Not Asked        Pt has a pacemaker: No        Pt has a defibrillator: No        Breast feeding: Not Asked        Reaction to local anesthetic: No possible dental damage if relevant, major complications, and any alternative forms of anesthetic management. All of the patient's questions were answered to the best of my ability. The patient desires the anesthetic management as planned.   Blanche Matthews

## 2020-01-28 NOTE — BRIEF OP NOTE
Pre-Operative Diagnosis: dysplastic nevus     Post-Operative Diagnosis: dysplastic nevus      Procedure Performed:   Procedure(s):  wide excision lesion(s) right ear, full thickness skin graft right postauricular    Surgeon(s) and Role:     * Jennifer MEDINA

## 2020-01-28 NOTE — ANESTHESIA PROCEDURE NOTES
Airway  Date/Time: 1/28/2020 7:32 AM  Urgency: Elective    Airway not difficult    General Information and Staff    Patient location during procedure: OR  Anesthesiologist: Carrington Alexis MD  Performed: anesthesiologist     Indications and Patient Conditio

## 2020-01-28 NOTE — ANESTHESIA POSTPROCEDURE EVALUATION
Patient: Italo Hi    Procedure Summary     Date:  01/28/20 Room / Location:  [address] [place] [address] MAIN OR 01 / [address] [facility] MAIN OR    Anesthesia Start:  3938 Anesthesia Stop:  7362    Procedure:  LESION WIDE EXCISION WITH SKIN GRAFT (Right ) Diagnosis:  (dysplastic nevus)

## 2020-01-29 ENCOUNTER — TELEPHONE (OUTPATIENT)
Dept: SURGERY | Facility: CLINIC | Age: 75
End: 2020-01-29

## 2020-01-29 NOTE — TELEPHONE ENCOUNTER
Left message for PO patient to please call the office with any questions and/or concerns. Reminded patient of next RN appointment on 2/4/20 and MD appointment on 2/17/20. Reminded to keep surgical dressings clean and dry. Dr. Carina Deluca notified.

## 2020-01-30 ENCOUNTER — APPOINTMENT (OUTPATIENT)
Dept: ULTRASOUND IMAGING | Facility: HOSPITAL | Age: 75
End: 2020-01-30
Attending: EMERGENCY MEDICINE
Payer: MEDICARE

## 2020-01-30 ENCOUNTER — HOSPITAL ENCOUNTER (EMERGENCY)
Facility: HOSPITAL | Age: 75
Discharge: HOME | End: 2020-01-30
Attending: EMERGENCY MEDICINE
Payer: MEDICARE

## 2020-01-30 ENCOUNTER — TELEPHONE (OUTPATIENT)
Dept: SURGERY | Facility: CLINIC | Age: 75
End: 2020-01-30

## 2020-01-30 VITALS
DIASTOLIC BLOOD PRESSURE: 84 MMHG | TEMPERATURE: 97 F | WEIGHT: 138 LBS | RESPIRATION RATE: 18 BRPM | BODY MASS INDEX: 23 KG/M2 | HEART RATE: 72 BPM | SYSTOLIC BLOOD PRESSURE: 173 MMHG

## 2020-01-30 DIAGNOSIS — R25.2 LEG CRAMPING: Primary | ICD-10-CM

## 2020-01-30 DIAGNOSIS — R23.3 PETECHIAL RASH: ICD-10-CM

## 2020-01-30 LAB
ALBUMIN SERPL-MCNC: 3.2 G/DL (ref 3.4–5)
ALBUMIN/GLOB SERPL: 0.6 {RATIO} (ref 1–2)
ALP LIVER SERPL-CCNC: 108 U/L (ref 55–142)
ALT SERPL-CCNC: 24 U/L (ref 13–56)
ANION GAP SERPL CALC-SCNC: 2 MMOL/L (ref 0–18)
AST SERPL-CCNC: 25 U/L (ref 15–37)
BASOPHILS # BLD AUTO: 0.03 X10(3) UL (ref 0–0.2)
BASOPHILS NFR BLD AUTO: 0.5 %
BILIRUB SERPL-MCNC: 0.2 MG/DL (ref 0.1–2)
BUN BLD-MCNC: 20 MG/DL (ref 7–18)
BUN/CREAT SERPL: 22 (ref 10–20)
CALCIUM BLD-MCNC: 9.1 MG/DL (ref 8.5–10.1)
CHLORIDE SERPL-SCNC: 103 MMOL/L (ref 98–112)
CO2 SERPL-SCNC: 32 MMOL/L (ref 21–32)
CREAT BLD-MCNC: 0.91 MG/DL (ref 0.55–1.02)
DEPRECATED RDW RBC AUTO: 44.1 FL (ref 35.1–46.3)
EOSINOPHIL # BLD AUTO: 0.13 X10(3) UL (ref 0–0.7)
EOSINOPHIL NFR BLD AUTO: 2.2 %
ERYTHROCYTE [DISTWIDTH] IN BLOOD BY AUTOMATED COUNT: 13.6 % (ref 11–15)
GLOBULIN PLAS-MCNC: 5.8 G/DL (ref 2.8–4.4)
GLUCOSE BLD-MCNC: 106 MG/DL (ref 70–99)
HCT VFR BLD AUTO: 37.5 % (ref 35–48)
HGB BLD-MCNC: 12.5 G/DL (ref 12–16)
IMM GRANULOCYTES # BLD AUTO: 0.02 X10(3) UL (ref 0–1)
IMM GRANULOCYTES NFR BLD: 0.3 %
LYMPHOCYTES # BLD AUTO: 1.59 X10(3) UL (ref 1–4)
LYMPHOCYTES NFR BLD AUTO: 26.9 %
M PROTEIN MFR SERPL ELPH: 9 G/DL (ref 6.4–8.2)
MCH RBC QN AUTO: 29.6 PG (ref 26–34)
MCHC RBC AUTO-ENTMCNC: 33.3 G/DL (ref 31–37)
MCV RBC AUTO: 88.9 FL (ref 80–100)
MONOCYTES # BLD AUTO: 0.5 X10(3) UL (ref 0.1–1)
MONOCYTES NFR BLD AUTO: 8.5 %
NEUTROPHILS # BLD AUTO: 3.64 X10 (3) UL (ref 1.5–7.7)
NEUTROPHILS # BLD AUTO: 3.64 X10(3) UL (ref 1.5–7.7)
NEUTROPHILS NFR BLD AUTO: 61.6 %
OSMOLALITY SERPL CALC.SUM OF ELEC: 287 MOSM/KG (ref 275–295)
PLATELET # BLD AUTO: 254 10(3)UL (ref 150–450)
POTASSIUM SERPL-SCNC: 4.4 MMOL/L (ref 3.5–5.1)
RBC # BLD AUTO: 4.22 X10(6)UL (ref 3.8–5.3)
SODIUM SERPL-SCNC: 137 MMOL/L (ref 136–145)
WBC # BLD AUTO: 5.9 X10(3) UL (ref 4–11)

## 2020-01-30 PROCEDURE — 93971 EXTREMITY STUDY: CPT | Performed by: EMERGENCY MEDICINE

## 2020-01-30 PROCEDURE — 99284 EMERGENCY DEPT VISIT MOD MDM: CPT

## 2020-01-30 PROCEDURE — 36415 COLL VENOUS BLD VENIPUNCTURE: CPT

## 2020-01-30 PROCEDURE — 80053 COMPREHEN METABOLIC PANEL: CPT | Performed by: EMERGENCY MEDICINE

## 2020-01-30 PROCEDURE — 85025 COMPLETE CBC W/AUTO DIFF WBC: CPT | Performed by: EMERGENCY MEDICINE

## 2020-01-30 NOTE — TELEPHONE ENCOUNTER
Spoke w/ patient. Pt. told per Dr. Suzzanna Severin pathology results from surgery, that the entire lesion has been removed. Juan Carlos Hoaung was no cancer. Pt. Verbalized understanding. Pt. Instructed to call the office w/ any questions and/or concerns.   Dr. Therese Mcclendon

## 2020-01-31 NOTE — ED INITIAL ASSESSMENT (HPI)
Pt presents with complaint of r lwer leg pain since this am denies trauma edema or numbness or tingling

## 2020-01-31 NOTE — ED PROVIDER NOTES
Patient Seen in: [facility] Emergency Department      History   Patient presents with:  Lower Extremity Injury    Stated Complaint: R leg pain    HPI    66-year-old female presents to the emerge department with planes of increasing swelling and pain No pertinent past surgical history. No pertinent social history. Review of Systems   All other systems reviewed and are negative. Positive for stated complaint: R leg pain  Other systems are as noted in HPI.   Constit Cranial Nerves: No cranial nerve deficit. Coordination: Coordination normal.      Deep Tendon Reflexes: Reflexes are normal and symmetric.               ED Course     Labs Reviewed   COMP METABOLIC PANEL (14) - Abnormal; Notable for the following co [address] [facility]  [phone]    Schedule an appointment as soon as possible for a visit          Medications Prescribed:  Discharge Medication List as of 1/30/2020 10:00 PM

## 2020-02-04 ENCOUNTER — NURSE ONLY (OUTPATIENT)
Dept: SURGERY | Facility: CLINIC | Age: 75
End: 2020-02-04
Payer: MEDICARE

## 2020-02-04 DIAGNOSIS — D48.5 NEOPLASM OF UNCERTAIN BEHAVIOR OF SKIN: ICD-10-CM

## 2020-02-04 DIAGNOSIS — Z48.02 VISIT FOR SUTURE REMOVAL: Primary | ICD-10-CM

## 2020-02-04 NOTE — PROGRESS NOTES
Surgery 1: L thigh congenital nevus / flap  - Date: 18  - Days Since: 753    Surgery 2: R ear dysplastic nevus / FTSG  - Date: 20  - Days Since: 7  Pt is in the office today for tie over removal L ear. Pt identified by name and .   Orders re

## 2020-02-17 ENCOUNTER — OFFICE VISIT (OUTPATIENT)
Dept: SURGERY | Facility: CLINIC | Age: 75
End: 2020-02-17
Payer: MEDICARE

## 2020-02-17 DIAGNOSIS — D48.5 NEOPLASM OF UNCERTAIN BEHAVIOR OF SKIN: Primary | ICD-10-CM

## 2020-02-17 PROCEDURE — 99024 POSTOP FOLLOW-UP VISIT: CPT | Performed by: PLASTIC SURGERY

## 2020-02-17 PROCEDURE — G0463 HOSPITAL OUTPT CLINIC VISIT: HCPCS | Performed by: PLASTIC SURGERY

## 2020-02-17 NOTE — PROGRESS NOTES
Surgery 1: L thigh congenital nevus / flap  - Date: 01/12/18  - Days Since: 766    Surgery 2: R ear dysplastic nevus / FTSG  - Date: 01/28/20  - Days Since: 20      Patient here for surgical follow-up of right ear and full thickness skin graft to right pre

## 2020-02-18 ENCOUNTER — TELEPHONE (OUTPATIENT)
Dept: HEMATOLOGY/ONCOLOGY | Facility: HOSPITAL | Age: 75
End: 2020-02-18

## 2020-02-18 DIAGNOSIS — C50.911 MALIGNANT NEOPLASM OF RIGHT FEMALE BREAST, UNSPECIFIED ESTROGEN RECEPTOR STATUS, UNSPECIFIED SITE OF BREAST (HCC): Primary | ICD-10-CM

## 2020-02-18 DIAGNOSIS — Z85.3 PERSONAL HISTORY OF MALIGNANT NEOPLASM OF BREAST: ICD-10-CM

## 2020-02-18 NOTE — TELEPHONE ENCOUNTER
Leonila Davis from central scheduling called saying Romina's diagnostic mammogram order does not pass medical necessity, and would like to know if a new one could be written that passes medical necessity.  Please call

## 2020-02-27 ENCOUNTER — OFFICE VISIT (OUTPATIENT)
Dept: FAMILY MEDICINE CLINIC | Facility: CLINIC | Age: 75
End: 2020-02-27
Payer: MEDICARE

## 2020-02-27 VITALS
RESPIRATION RATE: 16 BRPM | HEART RATE: 60 BPM | SYSTOLIC BLOOD PRESSURE: 124 MMHG | BODY MASS INDEX: 23.49 KG/M2 | WEIGHT: 141 LBS | TEMPERATURE: 98 F | DIASTOLIC BLOOD PRESSURE: 74 MMHG | HEIGHT: 65 IN

## 2020-02-27 DIAGNOSIS — I35.0 AORTIC VALVE STENOSIS, ETIOLOGY OF CARDIAC VALVE DISEASE UNSPECIFIED: ICD-10-CM

## 2020-02-27 DIAGNOSIS — Z13.31 DEPRESSION SCREENING: ICD-10-CM

## 2020-02-27 DIAGNOSIS — E78.2 MIXED HYPERLIPIDEMIA: ICD-10-CM

## 2020-02-27 DIAGNOSIS — Z78.0 POSTMENOPAUSAL: ICD-10-CM

## 2020-02-27 DIAGNOSIS — E89.0 HYPOTHYROIDISM, POSTRADIOIODINE THERAPY: ICD-10-CM

## 2020-02-27 DIAGNOSIS — E55.9 VITAMIN D INSUFFICIENCY: Primary | ICD-10-CM

## 2020-02-27 DIAGNOSIS — Z11.59 NEED FOR HEPATITIS C SCREENING TEST: ICD-10-CM

## 2020-02-27 DIAGNOSIS — Z00.00 ENCOUNTER FOR ANNUAL HEALTH EXAMINATION: ICD-10-CM

## 2020-02-27 PROBLEM — L57.8 SUN-DAMAGED SKIN: Status: RESOLVED | Noted: 2017-05-30 | Resolved: 2020-02-27

## 2020-02-27 PROCEDURE — G0444 DEPRESSION SCREEN ANNUAL: HCPCS | Performed by: FAMILY MEDICINE

## 2020-02-27 PROCEDURE — G0439 PPPS, SUBSEQ VISIT: HCPCS | Performed by: FAMILY MEDICINE

## 2020-02-27 NOTE — PATIENT INSTRUCTIONS
Romina Delgado's SCREENING SCHEDULE   Tests on this list are recommended by your physician but may not be covered, or covered at this frequency, by your insurer. Please check with your insurance carrier before scheduling to verify coverage.    PREVENTATI results found for this or any previous visit.  Limited to patients who meet one of the following criteria:   • Men who are 73-68 years old and have smoked more than 100 cigarettes in their lifetime   • Anyone with a family history    Colorectal Cancer Scree get this Mammogram regularly   Immunizations      Influenza  Covered Annually Orders placed or performed in visit on 01/18/17   • FLU VACC [address] ANTIG   Orders placed or performed in visit on 01/19/16   • INFLUENZA VIRUS VACCINE, PRESERV FREE, >=3 State forms available on it's website for anyone to review and print using their home computer and printer. (the forms are also available in [address])  www. [email or web address] org  This link also has information from the [address] regarding A

## 2020-02-27 NOTE — PROGRESS NOTES
HPI:   Norberto Lorenzo is a 76year old female who presents for a Medicare Subsequent Annual Wellness visit (Pt already had Initial Annual Wellness).       Her last annual assessment has been over 1 year: Annual Physical due on 01/08/2020            Imms-pa has Hearing problems based on screening of functional status.    Hearing Problems?: Yes      Depression Screening (PHQ-2/PHQ-9): Over the LAST 2 WEEKS   Little interest or pleasure in doing things (over the last two weeks)?: Not at all  Feeling down, depres Encounters:  02/27/20 : 141 lb (64 kg)  01/30/20 : 138 lb (62.6 kg)  01/28/20 : 137 lb 4.8 oz (62.3 kg)     Last Cholesterol Labs:   Lab Results   Component Value Date    CHOLEST 125 04/25/2019    HDL 50 04/25/2019    LDL 56 04/25/2019    TRIG 97 04/25/201 antineoplastic chemotherapy, Personal history of colonic polyps (2007), PONV (postoperative nausea and vomiting), S/P excision of acoustic neuroma (~2008), and Vitamin D insufficiency (2011).     She  has a past surgical history that includes other surgical 98.4 °F (36.9 °C) (Temporal)   Resp 16   Ht 65\"   Wt 141 lb (64 kg)   LMP 01/01/1994   BMI 23.46 kg/m²   GENERAL APPEARANCE:  Alert, no acute distress, appears stated age  [de-identified]:  Head- Normocephalic, atraumatic.     Eyes- Extraocular movements intact, pup diet, lifestyle, and exercise. No follow-ups on file.      Deidra Jacinto MD, 2/27/2020     General Health     In the past six months, have you lost more than 10 pounds without trying?: 2 - No  Has your appetite been poor?: No  How does the patient Pap: Every 3 yrs age 21-65 or Pap+HPV every 5 yrs age 33-67, age 72 and older at high risk There are no preventive care reminders to display for this patient.  Update Health Maintenance if applicable    Chlamydia  Annually if high risk No results found 05/24/2018 0.71     Creatinine (mg/dL)   Date Value   01/30/2020 0.91    No flowsheet data found. Drug Serum Conc  Annually No results found for: DIGOXIN, DIG, VALP No flowsheet data found.                Template: [facility] MEDICARE ANNUAL ASSESSMENT FE

## 2020-03-02 ENCOUNTER — HOSPITAL ENCOUNTER (OUTPATIENT)
Dept: MAMMOGRAPHY | Facility: HOSPITAL | Age: 75
Discharge: HOME | End: 2020-03-02
Attending: INTERNAL MEDICINE
Payer: MEDICARE

## 2020-03-02 DIAGNOSIS — Z85.3 PERSONAL HISTORY OF MALIGNANT NEOPLASM OF BREAST: ICD-10-CM

## 2020-03-02 DIAGNOSIS — C50.911 MALIGNANT NEOPLASM OF RIGHT FEMALE BREAST, UNSPECIFIED ESTROGEN RECEPTOR STATUS, UNSPECIFIED SITE OF BREAST (HCC): ICD-10-CM

## 2020-03-02 PROCEDURE — 77062 BREAST TOMOSYNTHESIS BI: CPT | Performed by: INTERNAL MEDICINE

## 2020-03-02 PROCEDURE — 77066 DX MAMMO INCL CAD BI: CPT | Performed by: INTERNAL MEDICINE

## 2020-03-03 ENCOUNTER — OFFICE VISIT (OUTPATIENT)
Dept: DERMATOLOGY CLINIC | Facility: CLINIC | Age: 75
End: 2020-03-03
Payer: MEDICARE

## 2020-03-03 DIAGNOSIS — Z86.018 HISTORY OF DYSPLASTIC NEVUS: Primary | ICD-10-CM

## 2020-03-03 DIAGNOSIS — Z85.828 PERSONAL HISTORY OF SKIN CANCER: ICD-10-CM

## 2020-03-03 DIAGNOSIS — L57.8 SUN-DAMAGED SKIN: ICD-10-CM

## 2020-03-03 PROCEDURE — 99212 OFFICE O/P EST SF 10 MIN: CPT | Performed by: DERMATOLOGY

## 2020-03-03 PROCEDURE — G0463 HOSPITAL OUTPT CLINIC VISIT: HCPCS | Performed by: DERMATOLOGY

## 2020-03-03 NOTE — PROGRESS NOTES
HPI:     Chief Complaint     Derm Problem        HPI     Derm Problem      Additional comments: LOV 11/21/2019 - Pt presenting for f/u for excision of atypical mole with moderate dysplasia to rt pinna (Dr. Danny Rivers performed surgery).   Pt has personal hx of dysp cell carcinoma of skin) 2017    left nasal rim   • Breast cancer ([facility] [place] [address] 2011    Invasive ductal and lobular CA, R   • Dysplastic nevus 2019    right ear pinna, Full Thickness Skin Graft to right post auricular    • Essential hypertension    • Exposure to me lobular   • OTHER  10/11/2018    removed benign tumor of nerve R ear (after h/o acoustic neuroma) Dr. Magdy Cheng, ENT, [place]   • OTHER SURGICAL HISTORY  2/2008    ACCUSTIC NEUROMA, Craniotomy excision   • OTHER SURGICAL HISTORY      cystoscopy (diagnostic) Intimate partner violence:        Fear of current or ex partner: Not on file        Emotionally abused: Not on file        Physically abused: Not on file        Forced sexual activity: Not on file    Other Topics      Concerns:         Service: Not (primary encounter diagnosis)-no evidence of recurrence. Patient does have follow-up for full body check the end of May.   Understands ABCDs of melanoma self exams monthly  Sun-damaged skin-proper use and application of broad-spectrum sunblock SPF 27 or hi

## 2020-03-09 ENCOUNTER — TELEPHONE (OUTPATIENT)
Dept: FAMILY MEDICINE CLINIC | Facility: CLINIC | Age: 75
End: 2020-03-09

## 2020-03-09 DIAGNOSIS — E89.0 HYPOTHYROIDISM, POSTRADIOIODINE THERAPY: ICD-10-CM

## 2020-03-09 DIAGNOSIS — E78.2 MIXED HYPERLIPIDEMIA: ICD-10-CM

## 2020-03-09 DIAGNOSIS — E55.9 VITAMIN D INSUFFICIENCY: ICD-10-CM

## 2020-03-09 DIAGNOSIS — Z11.59 NEED FOR HEPATITIS C SCREENING TEST: Primary | ICD-10-CM

## 2020-03-09 NOTE — TELEPHONE ENCOUNTER
Patient states she needs her lab orders sent to [facility], please advise.

## 2020-03-16 LAB
CHOL/HDLC RATIO: 2.8 (CALC)
CHOLESTEROL, TOTAL: 130 MG/DL
HDL CHOLESTEROL: 47 MG/DL
LDL-CHOLESTEROL: 65 MG/DL (CALC)
NON-HDL CHOLESTEROL: 83 MG/DL (CALC)
SIGNAL TO CUT-OFF: 0.03
TRIGLYCERIDES: 96 MG/DL
TSH: 2.74 MIU/L (ref 0.4–4.5)
VITAMIN D, 25-OH, TOTAL: 60 NG/ML (ref 30–100)

## 2020-03-18 DIAGNOSIS — E03.9 ACQUIRED HYPOTHYROIDISM: ICD-10-CM

## 2020-03-18 RX ORDER — LEVOTHYROXINE SODIUM 88 UG/1
88 TABLET ORAL
Qty: 90 TABLET | Refills: 3 | Status: SHIPPED | OUTPATIENT
Start: 2020-03-18 | End: 2020-05-07

## 2020-04-21 ENCOUNTER — APPOINTMENT (OUTPATIENT)
Dept: HEMATOLOGY/ONCOLOGY | Facility: HOSPITAL | Age: 75
End: 2020-04-21
Attending: INTERNAL MEDICINE
Payer: MEDICARE

## 2020-05-07 DIAGNOSIS — E03.9 ACQUIRED HYPOTHYROIDISM: ICD-10-CM

## 2020-05-07 RX ORDER — LEVOTHYROXINE SODIUM 88 UG/1
TABLET ORAL
Qty: 90 TABLET | Refills: 2 | Status: SHIPPED | OUTPATIENT
Start: 2020-05-07 | End: 2021-02-18

## 2020-05-07 NOTE — TELEPHONE ENCOUNTER
Requested Prescriptions     Pending Prescriptions Disp Refills   • LEVOTHYROXINE SODIUM 88 MCG Oral Tab [Pharmacy Med Name: LEVOTHYROXIN 88MCG TAB TABLET] 90 tablet 3     Sig: TAKE ONE TABLET BY MOUTH ONCE DAILY BEFORE BREAKFAST       LOV: 2/27/20 for lacey

## 2020-05-28 ENCOUNTER — OFFICE VISIT (OUTPATIENT)
Dept: DERMATOLOGY CLINIC | Facility: CLINIC | Age: 75
End: 2020-05-28
Payer: MEDICARE

## 2020-05-28 DIAGNOSIS — L81.4 SOLAR LENTIGO: ICD-10-CM

## 2020-05-28 DIAGNOSIS — D23.5 BENIGN NEOPLASM OF SKIN OF TRUNK, EXCEPT SCROTUM: ICD-10-CM

## 2020-05-28 DIAGNOSIS — D23.30 BENIGN NEOPLASM OF SKIN OF FACE: ICD-10-CM

## 2020-05-28 DIAGNOSIS — Z86.018 HISTORY OF DYSPLASTIC NEVUS: ICD-10-CM

## 2020-05-28 DIAGNOSIS — Z85.828 PERSONAL HISTORY OF SKIN CANCER: Primary | ICD-10-CM

## 2020-05-28 DIAGNOSIS — D23.4 BENIGN NEOPLASM OF SCALP AND SKIN OF NECK: ICD-10-CM

## 2020-05-28 DIAGNOSIS — D23.60 BENIGN NEOPLASM OF SKIN OF UPPER LIMB, INCLUDING SHOULDER, UNSPECIFIED LATERALITY: ICD-10-CM

## 2020-05-28 DIAGNOSIS — L82.1 SEBORRHEIC KERATOSES: ICD-10-CM

## 2020-05-28 DIAGNOSIS — L57.8 SUN-DAMAGED SKIN: ICD-10-CM

## 2020-05-28 PROCEDURE — 99213 OFFICE O/P EST LOW 20 MIN: CPT | Performed by: DERMATOLOGY

## 2020-05-28 PROCEDURE — G0463 HOSPITAL OUTPT CLINIC VISIT: HCPCS | Performed by: DERMATOLOGY

## 2020-05-28 NOTE — PROGRESS NOTES
HPI:     Chief Complaint     Full Skin Exam        HPI     Full Skin Exam      Additional comments: LOV 3/3/2020 Patient present for full body skin exam . Patient has hx of [address]          Last edited by Susan Leblanc, [address] on 5/28/2020  2:57 PM. (History) Medical History:   Diagnosis Date   • BCC (basal cell carcinoma of skin) 2017    left nasal rim   • Breast cancer ([facility] [address] 2011    Invasive ductal and lobular CA, R   • Dysplastic nevus 2019    right ear pinna, Full Thickness Skin Graft to right post auricula RIGHT Right 3/2011    Invasive ductal and lobular   • OTHER  10/11/2018    removed benign tumor of nerve R ear (after h/o acoustic neuroma) Dr. Magdy Cheng, ENT, [place]   • OTHER SURGICAL HISTORY  2/2008    ACCUSTIC NEUROMA, Craniotomy excision   • OTHER SURG Relationship status: Not on file      Intimate partner violence:        Fear of current or ex partner: Not on file        Emotionally abused: Not on file        Physically abused: Not on file        Forced sexual activity: Not on file    Other Topics remarkable for the following:  - there is no evidence of recurrent basal cell carcinoma from nasal rim or keratoacanthoma from right upper medial arm.   Also no evidence of recurrence of the severely dysplastic nevus from the right ear  - melanocytic nevi a hour(s)). Meds This Visit:      Imaging Orders:  None     Referral Orders:  No orders of the defined types were placed in this encounter.         5/28/2020  Felipe Lance

## 2020-06-29 NOTE — PROGRESS NOTES
[facility] Progress Note      Patient Name:  Italo Hi  YOB: 1945  Medical Record Number:  [id number]    Encounter date: 6/30/2020    CHIEF COMPLAINT: Stage I R breast carcinoma s/p lumpectomy     HPI:      76year old  fema nightly. • Multiple Minerals-Vitamins Oral Chew Tab Chew 1 tablet by mouth daily. • Calcium 500 MG Oral Chew Tab Chew  by mouth daily.          VITALS:  Height: --  Weight: 63.6 kg (140 lb 3.2 oz) (06/30 1250)  BSA (Calculated - sq m): --  Pulse: 61

## 2020-06-30 ENCOUNTER — OFFICE VISIT (OUTPATIENT)
Dept: HEMATOLOGY/ONCOLOGY | Facility: HOSPITAL | Age: 75
End: 2020-06-30
Attending: INTERNAL MEDICINE
Payer: MEDICARE

## 2020-06-30 VITALS
BODY MASS INDEX: 23 KG/M2 | HEART RATE: 61 BPM | TEMPERATURE: 99 F | RESPIRATION RATE: 18 BRPM | DIASTOLIC BLOOD PRESSURE: 51 MMHG | SYSTOLIC BLOOD PRESSURE: 127 MMHG | OXYGEN SATURATION: 94 % | WEIGHT: 140.19 LBS

## 2020-06-30 DIAGNOSIS — C50.911 MALIGNANT NEOPLASM OF RIGHT FEMALE BREAST, UNSPECIFIED ESTROGEN RECEPTOR STATUS, UNSPECIFIED SITE OF BREAST (HCC): Primary | ICD-10-CM

## 2020-06-30 DIAGNOSIS — Z12.31 ENCOUNTER FOR SCREENING MAMMOGRAM FOR MALIGNANT NEOPLASM OF BREAST: ICD-10-CM

## 2020-06-30 PROCEDURE — 99213 OFFICE O/P EST LOW 20 MIN: CPT | Performed by: INTERNAL MEDICINE

## 2020-06-30 NOTE — PROGRESS NOTES
Pt feeling well. Imaging up to date.    Education Record    Learner:  Patient    Disease / Christi Ellsworth of care    Barriers / Limitations:  None   Comments:    Method:  Discussion   Comments:    General Topics:  Plan of care reviewed   Comments:    Nichole Villarreal

## 2020-09-02 ENCOUNTER — HOSPITAL ENCOUNTER (OUTPATIENT)
Dept: BONE DENSITY | Age: 75
Discharge: HOME | End: 2020-09-02
Attending: FAMILY MEDICINE
Payer: MEDICARE

## 2020-09-02 DIAGNOSIS — Z78.0 POSTMENOPAUSAL: ICD-10-CM

## 2020-09-02 PROCEDURE — 77080 DXA BONE DENSITY AXIAL: CPT | Performed by: FAMILY MEDICINE

## 2020-09-24 ENCOUNTER — OFFICE VISIT (OUTPATIENT)
Dept: FAMILY MEDICINE CLINIC | Facility: CLINIC | Age: 75
End: 2020-09-24
Payer: MEDICARE

## 2020-09-24 VITALS
WEIGHT: 136 LBS | DIASTOLIC BLOOD PRESSURE: 70 MMHG | HEIGHT: 65 IN | TEMPERATURE: 98 F | HEART RATE: 58 BPM | OXYGEN SATURATION: 97 % | BODY MASS INDEX: 22.66 KG/M2 | SYSTOLIC BLOOD PRESSURE: 148 MMHG | RESPIRATION RATE: 16 BRPM

## 2020-09-24 DIAGNOSIS — Z23 NEED FOR VACCINATION: ICD-10-CM

## 2020-09-24 DIAGNOSIS — Z86.19 HISTORY OF VIRAL ILLNESS: ICD-10-CM

## 2020-09-24 DIAGNOSIS — L72.3 SEBACEOUS CYST: ICD-10-CM

## 2020-09-24 DIAGNOSIS — E89.0 HYPOTHYROIDISM, POSTRADIOIODINE THERAPY: ICD-10-CM

## 2020-09-24 DIAGNOSIS — E78.5 DYSLIPIDEMIA: ICD-10-CM

## 2020-09-24 DIAGNOSIS — E55.9 VITAMIN D INSUFFICIENCY: ICD-10-CM

## 2020-09-24 DIAGNOSIS — I35.0 NONRHEUMATIC AORTIC VALVE STENOSIS: ICD-10-CM

## 2020-09-24 DIAGNOSIS — I10 BENIGN ESSENTIAL HTN: Primary | ICD-10-CM

## 2020-09-24 PROCEDURE — G0008 ADMIN INFLUENZA VIRUS VAC: HCPCS | Performed by: FAMILY MEDICINE

## 2020-09-24 PROCEDURE — 90662 IIV NO PRSV INCREASED AG IM: CPT | Performed by: FAMILY MEDICINE

## 2020-09-24 PROCEDURE — 99214 OFFICE O/P EST MOD 30 MIN: CPT | Performed by: FAMILY MEDICINE

## 2020-09-24 RX ORDER — METOPROLOL SUCCINATE 25 MG/1
25 TABLET, EXTENDED RELEASE ORAL NIGHTLY
Qty: 90 TABLET | Refills: 3 | Status: CANCELLED | OUTPATIENT
Start: 2020-09-24

## 2020-09-24 RX ORDER — ATORVASTATIN CALCIUM 10 MG/1
10 TABLET, FILM COATED ORAL NIGHTLY
Qty: 90 TABLET | Refills: 3 | Status: SHIPPED | OUTPATIENT
Start: 2020-09-24 | End: 2021-04-06

## 2020-09-24 RX ORDER — LOSARTAN POTASSIUM AND HYDROCHLOROTHIAZIDE 12.5; 5 MG/1; MG/1
1 TABLET ORAL DAILY
Qty: 90 TABLET | Refills: 3 | Status: CANCELLED | OUTPATIENT
Start: 2020-09-24

## 2020-09-24 NOTE — PROGRESS NOTES
[facility] Visit Note  9/24/2020      Subjective:      Patient ID: Sofi Leblanc is a 76year old female.     Chief Complaint:  Patient presents with:  Blood Pressure: rountine check up, Influenza vaccine today  Cyst: on her back she wants check SARS-COV-2 IGG ANTIBODY; Future    3. Sebaceous cyst  -discussed benign nature and signs to look for to re-evaluate    4. Nonrheumatic aortic valve stenosis  -f/u with Dr. Hastings Aroda and knows he'll repeat an echo    5.  Dyslipidemia  - atorvastatin 10 MG Or

## 2020-10-01 ENCOUNTER — LAB ENCOUNTER (OUTPATIENT)
Dept: LAB | Age: 75
End: 2020-10-01
Attending: FAMILY MEDICINE
Payer: MEDICARE

## 2020-10-01 DIAGNOSIS — Z86.19 HISTORY OF VIRAL ILLNESS: ICD-10-CM

## 2020-10-01 PROCEDURE — 36415 COLL VENOUS BLD VENIPUNCTURE: CPT

## 2020-10-01 PROCEDURE — 86769 SARS-COV-2 COVID-19 ANTIBODY: CPT

## 2020-11-19 ENCOUNTER — OFFICE VISIT (OUTPATIENT)
Dept: DERMATOLOGY CLINIC | Facility: CLINIC | Age: 75
End: 2020-11-19
Payer: MEDICARE

## 2020-11-19 DIAGNOSIS — L81.4 SOLAR LENTIGO: ICD-10-CM

## 2020-11-19 DIAGNOSIS — D23.60 BENIGN NEOPLASM OF SKIN OF UPPER LIMB, INCLUDING SHOULDER, UNSPECIFIED LATERALITY: ICD-10-CM

## 2020-11-19 DIAGNOSIS — D23.4 BENIGN NEOPLASM OF SCALP AND SKIN OF NECK: ICD-10-CM

## 2020-11-19 DIAGNOSIS — D23.30 BENIGN NEOPLASM OF SKIN OF FACE: ICD-10-CM

## 2020-11-19 DIAGNOSIS — I35.0 NONRHEUMATIC AORTIC VALVE STENOSIS: ICD-10-CM

## 2020-11-19 DIAGNOSIS — L82.1 SEBORRHEIC KERATOSES: ICD-10-CM

## 2020-11-19 DIAGNOSIS — D23.5 BENIGN NEOPLASM OF SKIN OF TRUNK, EXCEPT SCROTUM: ICD-10-CM

## 2020-11-19 DIAGNOSIS — Z86.018 HISTORY OF DYSPLASTIC NEVUS: ICD-10-CM

## 2020-11-19 DIAGNOSIS — Z85.828 PERSONAL HISTORY OF SKIN CANCER: Primary | ICD-10-CM

## 2020-11-19 DIAGNOSIS — I10 BENIGN ESSENTIAL HTN: ICD-10-CM

## 2020-11-19 DIAGNOSIS — D23.70 BENIGN NEOPLASM OF SKIN OF LOWER LIMB, INCLUDING HIP, UNSPECIFIED LATERALITY: ICD-10-CM

## 2020-11-19 DIAGNOSIS — L57.8 SUN-DAMAGED SKIN: ICD-10-CM

## 2020-11-19 PROCEDURE — 99213 OFFICE O/P EST LOW 20 MIN: CPT | Performed by: DERMATOLOGY

## 2020-11-19 PROCEDURE — G0463 HOSPITAL OUTPT CLINIC VISIT: HCPCS | Performed by: DERMATOLOGY

## 2020-11-19 NOTE — PROGRESS NOTES
HPI:     Chief Complaint     Upper Body Exam        HPI     Upper Body Exam      Additional comments: LOV 5/28/20. pt presenting today with upper body skin check.  pt has HX of Dysplastic Nevus,AK's, [address]          Last edited by Bettina Hernandez, [address] on 11/1 Minerals-Vitamins Oral Chew Tab Chew 1 tablet by mouth daily. • Calcium 500 MG Oral Chew Tab Chew  by mouth daily.        Allergies:   No Known Allergies    Past Medical History:   Diagnosis Date   • BCC (basal cell carcinoma of skin) 2017    left nasal • LUMPECTOMY RIGHT  4/2011    IDC   • MARCO LOCALIZATION WIRE 1 SITE LEFT (CPT=19281)  1990's   • MIDDLE EAR SURGERY PROC UNLISTED  2018    at [facility]   • NEEDLE BIOPSY RIGHT Right 3/2011    Invasive ductal and lobular   • OTHER  10/11/2018    removed emeli Attends [employer] service: Not on file        Active member of club or organization: Not on file        Attends meetings of clubs or organizations: Not on file        Relationship status: Not on file      Intimate partner violence        Fear of current or and right lower extremity, and scalp    The patient is alert, oriented, and appears their stated age.   Patient is well nourished and in no distress    The exam was remarkable for the following:  - there is no evidence of recurrent dysplastic nevus from rig except scrotum  Benign neoplasm of skin of lower limb, including hip, unspecified laterality    No orders of the defined types were placed in this encounter.       Results From Past 48 Hours:  No results found for this or any previous visit (from the past 4

## 2020-11-20 RX ORDER — METOPROLOL SUCCINATE 25 MG/1
25 TABLET, EXTENDED RELEASE ORAL DAILY
Qty: 90 TABLET | Refills: 1 | Status: SHIPPED | OUTPATIENT
Start: 2020-11-20 | End: 2021-04-06

## 2020-11-20 NOTE — TELEPHONE ENCOUNTER
: METOPROLOL SUCC ER 25 MG TA 25 Tablet          Will file in chart as: METOPROLOL SUCCINATE ER 25 MG Oral Tablet 24 Hr    Sig: Take 1 tablet (25 mg total) by mouth daily.     Disp:  90 tablet    Refills:  3    Start: 11/19/2020    Class: Normal    Non-form

## 2021-01-21 ENCOUNTER — OFFICE VISIT (OUTPATIENT)
Dept: DERMATOLOGY CLINIC | Facility: CLINIC | Age: 76
End: 2021-01-21
Payer: MEDICARE

## 2021-01-21 DIAGNOSIS — L57.0 ACTINIC KERATOSIS: Primary | ICD-10-CM

## 2021-01-21 PROCEDURE — 17000 DESTRUCT PREMALG LESION: CPT | Performed by: DERMATOLOGY

## 2021-01-21 NOTE — PROGRESS NOTES
HPI:     Chief Complaint     Lesion        HPI     Lesion      Additional comments: LOV 11/19/20. pt presenting today with lesion to R cheek for 2 months. Lesion has changed in size. c/o picking at face. No pain or itching.  pt has HX of dysplastic nevus,AK Functional murmur     very mild aortic stenosis, mild TR, last echo 2017, Dr. David Aj q 2 yrs   • High cholesterol    • History of postmenopausal HRT     ~ 5 yrs.     • History of shingles 10/2018    R ear/mouth, likely CN V2 distribution   • Hypothyroidi thyroid surgery, 1981-radioactive iodine ablation   • OTHER SURGICAL HISTORY  Nov. 2013    abutment placed in right skull for hearing loss   • RADIATION RIGHT  2011   • SKIN TISSUE REARRANGEMENT Left 01/12/2018    Wide excision lesion, left thigh, flap rec Asked        Caffeine Concern: No        Occupational Exposure: Not Asked        Hobby Hazards: Not Asked        Sleep Concern: Not Asked        Stress Concern: Not Asked        Weight Concern: No        Special Diet: Not Asked        Back Care: Not Asked FIRST LES      Results From Past 48 Hours:  No results found for this or any previous visit (from the past 48 hour(s)). Meds This Visit:      Imaging Orders:  None     Referral Orders:  No orders of the defined types were placed in this encounter.

## 2021-01-28 ENCOUNTER — PATIENT MESSAGE (OUTPATIENT)
Dept: FAMILY MEDICINE CLINIC | Facility: CLINIC | Age: 76
End: 2021-01-28

## 2021-01-29 NOTE — TELEPHONE ENCOUNTER
From: Sofi Leblanc  To: Giana Nash MD  Sent: 1/28/2021 6:34 PM CST  Subject: Other    Dr. Vicenta Barrios,    I received my first [address] vaccine today at [facility]. It is in my records under [facility] my chart.  Can you access this, or h

## 2021-02-18 DIAGNOSIS — E03.9 ACQUIRED HYPOTHYROIDISM: ICD-10-CM

## 2021-02-18 RX ORDER — LEVOTHYROXINE SODIUM 88 UG/1
TABLET ORAL
Qty: 90 TABLET | Refills: 0 | Status: SHIPPED | OUTPATIENT
Start: 2021-02-18 | End: 2021-04-06

## 2021-02-18 NOTE — TELEPHONE ENCOUNTER
LEVOTHYROXINE 88 MCG TABLET 88 Tablet          Will file in chart as: LEVOTHYROXINE SODIUM 88 MCG Oral Tab    Sig: TAKE ONE TABLET BY MOUTH ONCE DAILY BEFORE BREAKFAST    Disp:  90 tablet    Refills:  2    Start: 2/18/2021    Class: Normal    Non-formulary

## 2021-02-23 ENCOUNTER — TELEPHONE (OUTPATIENT)
Dept: FAMILY MEDICINE CLINIC | Facility: CLINIC | Age: 76
End: 2021-02-23

## 2021-03-16 ENCOUNTER — OFFICE VISIT (OUTPATIENT)
Dept: FAMILY MEDICINE CLINIC | Facility: CLINIC | Age: 76
End: 2021-03-16
Payer: MEDICARE

## 2021-03-16 VITALS
OXYGEN SATURATION: 96 % | BODY MASS INDEX: 23.32 KG/M2 | TEMPERATURE: 98 F | HEART RATE: 58 BPM | DIASTOLIC BLOOD PRESSURE: 70 MMHG | HEIGHT: 65 IN | SYSTOLIC BLOOD PRESSURE: 158 MMHG | RESPIRATION RATE: 16 BRPM | WEIGHT: 140 LBS

## 2021-03-16 DIAGNOSIS — Z00.00 ENCOUNTER FOR ANNUAL HEALTH EXAMINATION: Primary | ICD-10-CM

## 2021-03-16 DIAGNOSIS — M79.642 BILATERAL HAND PAIN: ICD-10-CM

## 2021-03-16 DIAGNOSIS — Z12.31 ENCOUNTER FOR SCREENING MAMMOGRAM FOR MALIGNANT NEOPLASM OF BREAST: ICD-10-CM

## 2021-03-16 DIAGNOSIS — I10 ESSENTIAL HYPERTENSION: ICD-10-CM

## 2021-03-16 DIAGNOSIS — M79.641 BILATERAL HAND PAIN: ICD-10-CM

## 2021-03-16 DIAGNOSIS — Z85.3 HISTORY OF RIGHT BREAST CANCER: ICD-10-CM

## 2021-03-16 DIAGNOSIS — Z13.31 DEPRESSION SCREENING: ICD-10-CM

## 2021-03-16 DIAGNOSIS — E55.9 VITAMIN D INSUFFICIENCY: ICD-10-CM

## 2021-03-16 DIAGNOSIS — Z00.00 LABORATORY EXAM ORDERED AS PART OF ROUTINE GENERAL MEDICAL EXAMINATION: ICD-10-CM

## 2021-03-16 DIAGNOSIS — M81.0 AGE-RELATED OSTEOPOROSIS WITHOUT CURRENT PATHOLOGICAL FRACTURE: ICD-10-CM

## 2021-03-16 DIAGNOSIS — I35.0 NONRHEUMATIC AORTIC VALVE STENOSIS: ICD-10-CM

## 2021-03-16 DIAGNOSIS — E89.0 HYPOTHYROIDISM, POSTRADIOIODINE THERAPY: ICD-10-CM

## 2021-03-16 PROBLEM — Z86.018 HISTORY OF DYSPLASTIC NEVUS: Status: RESOLVED | Noted: 2020-03-03 | Resolved: 2021-03-16

## 2021-03-16 PROCEDURE — 99213 OFFICE O/P EST LOW 20 MIN: CPT | Performed by: FAMILY MEDICINE

## 2021-03-16 PROCEDURE — G0444 DEPRESSION SCREEN ANNUAL: HCPCS | Performed by: FAMILY MEDICINE

## 2021-03-16 PROCEDURE — G0439 PPPS, SUBSEQ VISIT: HCPCS | Performed by: FAMILY MEDICINE

## 2021-03-16 RX ORDER — HYDROCHLOROTHIAZIDE 25 MG/1
25 TABLET ORAL DAILY
COMMUNITY
End: 2021-03-26

## 2021-03-16 RX ORDER — ASPIRIN 81 MG/1
81 TABLET ORAL DAILY
COMMUNITY

## 2021-03-16 RX ORDER — LOSARTAN POTASSIUM 50 MG/1
50 TABLET ORAL DAILY
COMMUNITY
End: 2021-07-07 | Stop reason: ALTCHOICE

## 2021-03-16 NOTE — PROGRESS NOTES
HPI:   Gino Marie is a 76year old female who presents for a Medicare Subsequent Annual Wellness visit (Pt already had Initial Annual Wellness).       Her last annual assessment has been over 1 year: Annual Physical due on 02/27/2021         Azeb cuff was similar to ours on 3/16/21  158/68 office  152/79 home    Aortic stenosis- sees Dr. Mak Law. Last echo done 11/20 showing moderate stenosis with mild progression. Can walk up stairs and walks ~2mi/d with her dog without troubles.     Denies- sob, low risk.     Patient Care Team: Patient Care Team:  Hussein Griffith MD as PCP - Kirby Sutherland, Kathryn Medeiros MD as PCP - [facility]    Patient Active Problem List:     Iron deficiency anemia, unspecified     Aortic stenosis     History of right breast canc 500 MG Oral Chew Tab, Chew  by mouth daily.        MEDICAL INFORMATION:   She  has a past medical history of Aortic stenosis, BCC (basal cell carcinoma of skin) (2017), Cochlear implant in place (02/2020), Dysplastic nevus (2019), Essential hypertension, Ex b/c joints enlarged    EXAM:   /70 (BP Location: Left arm, Patient Position: Sitting, Cuff Size: adult)   Pulse 58   Temp 98 °F (36.7 °C) (Temporal)   Resp 16   Ht 5' 5\" (1.651 m)   Wt 140 lb (63.5 kg)   LMP 01/01/1994   SpO2 96%   BMI 23.30 kg/m² VAC High Dose 65 YRS & Older PRSV Free (42671) 09/24/2020   • FLUAD High Dose 65 yr and older (80266) 11/30/2017, 12/03/2018   • HIGH DOSE FLU 65 YRS AND OLDER PRSV FREE SINGLE D (30115) FLU CLINIC 01/18/2017   • Hep A, Adult 07/27/2016, 02/21/2017   • Pne the plan. Reinforced healthy diet, lifestyle, and exercise. Return in about 1 week (around 3/23/2021) for blood pressure check, review labs.      Ingrid Roche MD, 3/16/2021     General Health     In the past six months, have you lost more than 10 33-67, age 72 and older at high risk There are no preventive care reminders to display for this patient. Update Health Maintenance if applicable    Chlamydia  Annually if high risk No results found for: CHLAMYDIA No flowsheet data found.     Screening Mammo Creatinine (mg/dL)   Date Value   01/30/2020 0.91    No flowsheet data found. Drug Serum Conc  Annually No results found for: DIGOXIN, DIG, VALP No flowsheet data found.                  Template: [facility] [facility] MEDICARE ANNUAL ASSESSMENT FEMALE [00675]

## 2021-03-16 NOTE — PATIENT INSTRUCTIONS
Romina Delgado's SCREENING SCHEDULE   Tests on this list are recommended by your physician but may not be covered, or covered at this frequency, by your insurer. Please check with your insurance carrier before scheduling to verify coverage.    PREVENTATI have smoked more than 100 cigarettes in their lifetime   • Anyone with a family history    Colorectal Cancer Screening  Covered up to Age 76     Colonoscopy Screen   Covered every 10 years- more often if abnormal Colonoscopy due on 05/17/2028 Update Health performed in visit on 09/24/20   • FLU VACC HIGH DOSE PRSV FREE   Orders placed or performed in visit on 01/18/17   • FLU VACC PRSV FREE INC ANTIG   Orders placed or performed in visit on 01/19/16   • INFLUENZA VIRUS VACCINE, PRESERV FREE, >=1YEARS OF AGE available on it's website for anyone to review and print using their home computer and printer. (the forms are also available in [address])  [email or web address]  This link also has information from the [address] regarding Advance D[employer]c

## 2021-03-22 ENCOUNTER — HOSPITAL ENCOUNTER (OUTPATIENT)
Dept: MAMMOGRAPHY | Facility: HOSPITAL | Age: 76
Discharge: HOME | End: 2021-03-22
Attending: FAMILY MEDICINE
Payer: MEDICARE

## 2021-03-22 ENCOUNTER — HOSPITAL ENCOUNTER (OUTPATIENT)
Dept: GENERAL RADIOLOGY | Facility: HOSPITAL | Age: 76
Discharge: HOME | End: 2021-03-22
Attending: FAMILY MEDICINE
Payer: MEDICARE

## 2021-03-22 DIAGNOSIS — M79.642 BILATERAL HAND PAIN: ICD-10-CM

## 2021-03-22 DIAGNOSIS — Z12.31 ENCOUNTER FOR SCREENING MAMMOGRAM FOR MALIGNANT NEOPLASM OF BREAST: ICD-10-CM

## 2021-03-22 DIAGNOSIS — M79.641 BILATERAL HAND PAIN: ICD-10-CM

## 2021-03-22 PROCEDURE — 77063 BREAST TOMOSYNTHESIS BI: CPT | Performed by: FAMILY MEDICINE

## 2021-03-22 PROCEDURE — 73130 X-RAY EXAM OF HAND: CPT | Performed by: FAMILY MEDICINE

## 2021-03-22 PROCEDURE — 77067 SCR MAMMO BI INCL CAD: CPT | Performed by: FAMILY MEDICINE

## 2021-03-25 LAB
ABSOLUTE BASOPHILS: 32 CELLS/UL (ref 0–200)
ABSOLUTE EOSINOPHILS: 120 CELLS/UL (ref 15–500)
ABSOLUTE LYMPHOCYTES: 1212 CELLS/UL (ref 850–3900)
ABSOLUTE MONOCYTES: 480 CELLS/UL (ref 200–950)
ABSOLUTE NEUTROPHILS: 2156 CELLS/UL (ref 1500–7800)
ALBUMIN/GLOBULIN RATIO: 0.7 (CALC) (ref 1–2.5)
ALBUMIN: 3.6 G/DL (ref 3.6–5.1)
ALKALINE PHOSPHATASE: 80 U/L (ref 37–153)
ALT: 20 U/L (ref 6–29)
ANA SCREEN, IFA: POSITIVE
AST: 21 U/L (ref 10–35)
BASOPHILS: 0.8 %
BILIRUBIN, TOTAL: 0.6 MG/DL (ref 0.2–1.2)
BUN: 15 MG/DL (ref 7–25)
CALCIUM: 9.7 MG/DL (ref 8.6–10.4)
CARBON DIOXIDE: 33 MMOL/L (ref 20–32)
CHLORIDE: 98 MMOL/L (ref 98–110)
CHOL/HDLC RATIO: 3 (CALC)
CHOLESTEROL, TOTAL: 131 MG/DL
CREATININE: 0.68 MG/DL (ref 0.6–0.93)
DNA (DS) ANTIBODY: <1 IU/ML
EGFR IF AFRICN AM: 99 ML/MIN/1.73M2
EGFR IF NONAFRICN AM: 86 ML/MIN/1.73M2
EOSINOPHILS: 3 %
GLOBULIN: 4.9 G/DL (CALC) (ref 1.9–3.7)
GLUCOSE: 90 MG/DL (ref 65–99)
HDL CHOLESTEROL: 43 MG/DL
HEMATOCRIT: 38.7 % (ref 35–45)
HEMOGLOBIN: 12.7 G/DL (ref 11.7–15.5)
LDL-CHOLESTEROL: 70 MG/DL (CALC)
LYMPHOCYTES: 30.3 %
MCH: 29.2 PG (ref 27–33)
MCHC: 32.8 G/DL (ref 32–36)
MCV: 89 FL (ref 80–100)
MONOCYTES: 12 %
MPV: 9.6 FL (ref 7.5–12.5)
NEUTROPHILS: 53.9 %
NON-HDL CHOLESTEROL: 88 MG/DL (CALC)
PLATELET COUNT: 244 THOUSAND/UL (ref 140–400)
POTASSIUM: 4.2 MMOL/L (ref 3.5–5.3)
PROTEIN, TOTAL: 8.5 G/DL (ref 6.1–8.1)
RDW: 12.4 % (ref 11–15)
RED BLOOD CELL COUNT: 4.35 MILLION/UL (ref 3.8–5.1)
SODIUM: 136 MMOL/L (ref 135–146)
TRIGLYCERIDES: 101 MG/DL
TSH W/REFLEX TO FT4: 4.07 MIU/L (ref 0.4–4.5)
VITAMIN D, 25-OH, TOTAL: 64 NG/ML (ref 30–100)
WHITE BLOOD CELL COUNT: 4 THOUSAND/UL (ref 3.8–10.8)

## 2021-03-26 ENCOUNTER — OFFICE VISIT (OUTPATIENT)
Dept: FAMILY MEDICINE CLINIC | Facility: CLINIC | Age: 76
End: 2021-03-26
Payer: MEDICARE

## 2021-03-26 VITALS
DIASTOLIC BLOOD PRESSURE: 60 MMHG | TEMPERATURE: 98 F | BODY MASS INDEX: 22.99 KG/M2 | HEART RATE: 65 BPM | HEIGHT: 65 IN | WEIGHT: 138 LBS | OXYGEN SATURATION: 96 % | RESPIRATION RATE: 16 BRPM | SYSTOLIC BLOOD PRESSURE: 110 MMHG

## 2021-03-26 DIAGNOSIS — M19.042 ARTHRITIS OF LEFT HAND: ICD-10-CM

## 2021-03-26 DIAGNOSIS — R76.8 SS-A ANTIBODY POSITIVE: ICD-10-CM

## 2021-03-26 DIAGNOSIS — I10 ESSENTIAL HYPERTENSION: Primary | ICD-10-CM

## 2021-03-26 DIAGNOSIS — M19.041 ARTHRITIS OF RIGHT HAND: ICD-10-CM

## 2021-03-26 DIAGNOSIS — R76.8 POSITIVE ANA (ANTINUCLEAR ANTIBODY): ICD-10-CM

## 2021-03-26 PROCEDURE — 99214 OFFICE O/P EST MOD 30 MIN: CPT | Performed by: FAMILY MEDICINE

## 2021-03-26 RX ORDER — HYDROCHLOROTHIAZIDE 25 MG/1
12.5 TABLET ORAL DAILY
Qty: 15 TABLET | Refills: 0 | Status: SHIPPED | OUTPATIENT
Start: 2021-03-26 | End: 2021-07-06

## 2021-03-26 NOTE — PROGRESS NOTES
[facility] Visit Note  3/26/2021      Subjective:      Patient ID: Funmi Spencer is a 76year old female. Chief Complaint:  Patient presents with:  Blood Pressure: 1 week f/u, brought in list of home bp readings for review.   Lab Results: don Cuff Size: adult   Pulse: 65   Resp: 16   Temp: 97.5 °F (36.4 °C)   TempSrc: Temporal   SpO2: 96%   Weight: 138 lb (62.6 kg)   Height: 5' 5\" (1.651 m)       Physical Examination   General:  Alert, in no acute distress  HEENT: NCAT, EOMI, wearing mask  N

## 2021-04-06 ENCOUNTER — OFFICE VISIT (OUTPATIENT)
Dept: FAMILY MEDICINE CLINIC | Facility: CLINIC | Age: 76
End: 2021-04-06
Payer: MEDICARE

## 2021-04-06 VITALS
BODY MASS INDEX: 22.99 KG/M2 | TEMPERATURE: 98 F | OXYGEN SATURATION: 95 % | SYSTOLIC BLOOD PRESSURE: 120 MMHG | HEIGHT: 65 IN | DIASTOLIC BLOOD PRESSURE: 70 MMHG | WEIGHT: 138 LBS | RESPIRATION RATE: 16 BRPM | HEART RATE: 58 BPM

## 2021-04-06 DIAGNOSIS — E03.9 ACQUIRED HYPOTHYROIDISM: ICD-10-CM

## 2021-04-06 DIAGNOSIS — I10 BENIGN ESSENTIAL HTN: ICD-10-CM

## 2021-04-06 DIAGNOSIS — E78.5 DYSLIPIDEMIA: ICD-10-CM

## 2021-04-06 PROCEDURE — 99213 OFFICE O/P EST LOW 20 MIN: CPT | Performed by: FAMILY MEDICINE

## 2021-04-06 RX ORDER — METOPROLOL SUCCINATE 25 MG/1
25 TABLET, EXTENDED RELEASE ORAL DAILY
Qty: 90 TABLET | Refills: 1 | Status: SHIPPED | OUTPATIENT
Start: 2021-04-06 | End: 2021-11-19

## 2021-04-06 RX ORDER — ATORVASTATIN CALCIUM 10 MG/1
10 TABLET, FILM COATED ORAL NIGHTLY
Qty: 90 TABLET | Refills: 3 | Status: SHIPPED | OUTPATIENT
Start: 2021-04-06

## 2021-04-06 RX ORDER — LEVOTHYROXINE SODIUM 88 UG/1
88 TABLET ORAL
Qty: 90 TABLET | Refills: 3 | Status: SHIPPED | OUTPATIENT
Start: 2021-04-06

## 2021-04-06 NOTE — PROGRESS NOTES
[facility] Visit Note  4/6/2021      Subjective:      Patient ID: Loreta Hernandez is a 76year old female.     Chief Complaint:  Patient presents with:  Blood Pressure: brought in list of home bp readings      HPI:  Loreta Hernandez is a 76 year ol

## 2021-04-07 ENCOUNTER — TELEPHONE (OUTPATIENT)
Dept: FAMILY MEDICINE CLINIC | Facility: CLINIC | Age: 76
End: 2021-04-07

## 2021-04-07 DIAGNOSIS — R76.8 SS-A ANTIBODY POSITIVE: ICD-10-CM

## 2021-04-07 DIAGNOSIS — M19.042 ARTHRITIS OF LEFT HAND: Primary | ICD-10-CM

## 2021-04-07 DIAGNOSIS — R76.8 POSITIVE ANA (ANTINUCLEAR ANTIBODY): ICD-10-CM

## 2021-04-07 DIAGNOSIS — M19.041 ARTHRITIS OF RIGHT HAND: ICD-10-CM

## 2021-04-07 NOTE — TELEPHONE ENCOUNTER
Andrea Clermont/ [facility]  [address], [address] [facility]  Andrea, [address] [place] [address]  P: [phone]  F: [phone]

## 2021-04-08 NOTE — TELEPHONE ENCOUNTER
Authorized referral faxed to [facility] [address] [place] [address], [phone], confirmation received.

## 2021-05-11 ENCOUNTER — OFFICE VISIT (OUTPATIENT)
Dept: RHEUMATOLOGY | Facility: CLINIC | Age: 76
End: 2021-05-11
Payer: MEDICARE

## 2021-05-11 VITALS
TEMPERATURE: 98 F | WEIGHT: 140 LBS | HEIGHT: 65 IN | HEART RATE: 64 BPM | BODY MASS INDEX: 23.32 KG/M2 | DIASTOLIC BLOOD PRESSURE: 60 MMHG | SYSTOLIC BLOOD PRESSURE: 125 MMHG

## 2021-05-11 DIAGNOSIS — Z11.59 NEED FOR HEPATITIS B SCREENING TEST: ICD-10-CM

## 2021-05-11 DIAGNOSIS — Z11.59 NEED FOR HEPATITIS C SCREENING TEST: ICD-10-CM

## 2021-05-11 DIAGNOSIS — Z87.39 HX OF CALCIUM PYROPHOSPHATE DEPOSITION DISEASE (CPPD): ICD-10-CM

## 2021-05-11 DIAGNOSIS — Z82.61 FAMILY HISTORY OF RHEUMATOID ARTHRITIS: ICD-10-CM

## 2021-05-11 DIAGNOSIS — R76.8 POSITIVE ANA (ANTINUCLEAR ANTIBODY): Primary | ICD-10-CM

## 2021-05-11 DIAGNOSIS — R76.8 RHEUMATOID FACTOR POSITIVE: ICD-10-CM

## 2021-05-11 DIAGNOSIS — Z51.81 THERAPEUTIC DRUG MONITORING: ICD-10-CM

## 2021-05-11 DIAGNOSIS — M85.89 OSTEOPENIA OF MULTIPLE SITES: ICD-10-CM

## 2021-05-11 DIAGNOSIS — Z11.1 SCREENING FOR TUBERCULOSIS: ICD-10-CM

## 2021-05-11 PROCEDURE — 99204 OFFICE O/P NEW MOD 45 MIN: CPT | Performed by: INTERNAL MEDICINE

## 2021-05-11 RX ORDER — COLCHICINE 0.6 MG/1
0.6 TABLET ORAL DAILY
Qty: 30 TABLET | Refills: 2 | Status: SHIPPED | OUTPATIENT
Start: 2021-05-11 | End: 2021-09-03

## 2021-05-11 NOTE — PROGRESS NOTES
Rheumatology New Patient Note  =====================================================================================================      Date of visit: 5/11/2021  ?   Patient presents with:  Establish Care: New pt, referred by PCP for positive COLT and to Tab, Take 1 tablet (10 mg total) by mouth nightly., Disp: 90 tablet, Rfl: 3  Levothyroxine Sodium 88 MCG Oral Tab, Take 1 tablet (88 mcg total) by mouth every morning before breakfast., Disp: 90 tablet, Rfl: 3  hydrochlorothiazide 25 MG Oral Tab, Take 0.5 R, follows with Dr. Tiffany Taylor   • Vitamin D insufficiency 2011     Past Surgical History:  Past Surgical History:   Procedure Laterality Date   • BRAIN SURGERY     • CHEMOTHERAPY  2011   • COCHLEAR IMPLANT EXT SPEECH Right 02/2020   • COLONOSCOPY  5/11/2 Smokeless tobacco: Never Used      Tobacco comment: quit 30 years ago    Vaping Use      Vaping Use: Never used    Alcohol use: Yes      Comment: occasional    Drug use: No    ?  Allergies:  No Known Allergies      Objective     05/11/21  1256   BP: 125/60 26.9 01/30/2020    MOPERCENT 8.5 01/30/2020    EOPERCENT 2.2 01/30/2020    BAPERCENT 0.5 01/30/2020    NE 3.64 01/30/2020    LYMABS 1.59 01/30/2020    MOABSO 0.50 01/30/2020    EOABSO 0.13 01/30/2020    BAABSO 0.03 01/30/2020     Lab Results   Component Va 1st carpometacarpal joint. 2. Single periarticular erosion evident radial base proximal phalange of the 4th digit is non-specific but could indicate concomitant erosive arthritis.  Correlation with clinical findings is necessary.          9/2020 DEXA  LUM this would allow for classification of Sjogren's. We will also complete serologic work-up positive COLT. Otherwise connective tissue disease review of systems is negative. Patient with evidence of hand osteoarthritis.   I believe there are multiple etio RANDOM  -     CREATININE, URINE, RANDOM  -     URINALYSIS WITH CULTURE REFLEX  -     URIC ACID, SERUM  -     PTH, INTACT    Rheumatoid factor positive  -     SJOGREN'S ANTI-SS-A  -     SJOGREN'S ANTI-SS-B  -     RHEUMATOID ARTHRITIS FACTOR  -     CYCLIC CI Rheumatology

## 2021-05-11 NOTE — PATIENT INSTRUCTIONS
1) You have a positive SSA antibody, check with your son-in-law about doing dry eye Sjogren's testing. 2) Get bloodwork    3) start on colchicine 0.6 mg daily. 4) Use turmeric 1000 mg twice daily.      The following website has some tips on anti-infl

## 2021-05-13 ENCOUNTER — TELEPHONE (OUTPATIENT)
Dept: RHEUMATOLOGY | Facility: CLINIC | Age: 76
End: 2021-05-13

## 2021-05-13 NOTE — TELEPHONE ENCOUNTER
Pharmacy calling in, wanting to know provider is aware of the drug interaction with her colchicine 0.6 MG Oral Tab and her atorvastatin 10 MG Oral Tab. Pharmacy wanting to know if they can dispense together. Please advise.      Call back number [phone]

## 2021-05-13 NOTE — TELEPHONE ENCOUNTER
Yes I discussed this drug interaction with the patient in regards to potential additive mild toxicity of colchicine and atorvastatin. This is why the medication is at a lower dose 0.6 mg daily of 0.6 mg twice daily. It is for a short trial so toxic effects should not be significant. However I did discuss monitoring for the symptoms with the patient.   Please have the pharmacy dispensed

## 2021-05-14 NOTE — TELEPHONE ENCOUNTER
Called pt's pharmacy in regards to pts colchicine and her atorvastatin. Spoke to the pharmacist Oscar Aparicio and informed him of what Dr. Martha Godoy had documented. Pharmacist verbalized understanding and will dispense the medication. MD Emmanuelle Castro [facility]  Caller: Unspecified (Yesterday,  1:05 PM)  Yes I discussed this drug interaction with the patient in regards to potential additive mild toxicity of colchicine and atorvastatin. This is why the medication is at a lower dose 0.6 mg daily of 0.6 mg twice daily. It is for a short trial so toxic effects should not be significant. However I did discuss monitoring for the symptoms with the patient.   Please have the pharmacy dispensed

## 2021-05-19 ENCOUNTER — MED REC SCAN ONLY (OUTPATIENT)
Dept: FAMILY MEDICINE CLINIC | Facility: CLINIC | Age: 76
End: 2021-05-19

## 2021-05-20 NOTE — PROGRESS NOTES
[facility] Progress Note      Patient Name:  Michelle Best  YOB: 1945  Medical Record Number:  [id number]    Encounter date: 5/21/2021    CHIEF COMPLAINT: Stage I R breast carcinoma s/p lumpectomy     HPI:      76year old  fema daily.     • aspirin 81 MG Oral Tab EC Take 81 mg by mouth daily. • cholecalciferol 1000 UNITS Oral Cap Take 1,000 Units by mouth daily. • Melatonin 5 MG Oral Tab Take 1 tablet by mouth nightly.      • Multiple Minerals-Vitamins Oral Chew Tab Chew 1 visit. Ajay Skelton M.D.     [facility]    [address], [address]    5/21/2021

## 2021-05-21 ENCOUNTER — OFFICE VISIT (OUTPATIENT)
Dept: HEMATOLOGY/ONCOLOGY | Facility: HOSPITAL | Age: 76
End: 2021-05-21
Attending: INTERNAL MEDICINE
Payer: MEDICARE

## 2021-05-21 VITALS
OXYGEN SATURATION: 96 % | HEART RATE: 58 BPM | RESPIRATION RATE: 16 BRPM | HEIGHT: 65 IN | TEMPERATURE: 97 F | DIASTOLIC BLOOD PRESSURE: 52 MMHG | SYSTOLIC BLOOD PRESSURE: 146 MMHG | WEIGHT: 142 LBS | BODY MASS INDEX: 23.66 KG/M2

## 2021-05-21 DIAGNOSIS — Z12.31 ENCOUNTER FOR SCREENING MAMMOGRAM FOR MALIGNANT NEOPLASM OF BREAST: ICD-10-CM

## 2021-05-21 DIAGNOSIS — Z01.419 WELL WOMAN EXAM: Primary | ICD-10-CM

## 2021-05-21 PROCEDURE — 99213 OFFICE O/P EST LOW 20 MIN: CPT | Performed by: INTERNAL MEDICINE

## 2021-05-21 NOTE — PROGRESS NOTES
Patient is here for MD one year follow up for Breast cancer. Last mammogram was on 3/22/21. Patient is feeling well. Denies any complaints.        Education Record    Learner:  Patient    Disease / Diagnosis:  Breast cancer     Barriers / Limitations:  None

## 2021-05-27 ENCOUNTER — OFFICE VISIT (OUTPATIENT)
Dept: DERMATOLOGY CLINIC | Facility: CLINIC | Age: 76
End: 2021-05-27
Payer: MEDICARE

## 2021-05-27 DIAGNOSIS — Z85.828 PERSONAL HISTORY OF SKIN CANCER: Primary | ICD-10-CM

## 2021-05-27 DIAGNOSIS — Z86.018 HISTORY OF DYSPLASTIC NEVUS: ICD-10-CM

## 2021-05-27 DIAGNOSIS — L81.4 SOLAR LENTIGO: ICD-10-CM

## 2021-05-27 DIAGNOSIS — D23.4 BENIGN NEOPLASM OF SCALP AND SKIN OF NECK: ICD-10-CM

## 2021-05-27 DIAGNOSIS — D23.70 BENIGN NEOPLASM OF SKIN OF LOWER LIMB, INCLUDING HIP, UNSPECIFIED LATERALITY: ICD-10-CM

## 2021-05-27 DIAGNOSIS — L82.1 SEBORRHEIC KERATOSES: ICD-10-CM

## 2021-05-27 DIAGNOSIS — D23.5 BENIGN NEOPLASM OF SKIN OF TRUNK, EXCEPT SCROTUM: ICD-10-CM

## 2021-05-27 DIAGNOSIS — L57.8 SUN-DAMAGED SKIN: ICD-10-CM

## 2021-05-27 DIAGNOSIS — L71.9 ROSACEA: ICD-10-CM

## 2021-05-27 DIAGNOSIS — D23.30 BENIGN NEOPLASM OF SKIN OF FACE: ICD-10-CM

## 2021-05-27 DIAGNOSIS — D23.60 BENIGN NEOPLASM OF SKIN OF UPPER LIMB, INCLUDING SHOULDER, UNSPECIFIED LATERALITY: ICD-10-CM

## 2021-05-27 PROCEDURE — 99213 OFFICE O/P EST LOW 20 MIN: CPT | Performed by: DERMATOLOGY

## 2021-05-27 NOTE — PROGRESS NOTES
HPI:     Chief Complaint     Full Skin Exam        HPI     Full Skin Exam      Additional comments: LOV 1/21/2021 Patient present for full body skin exam . Patient has hx of bcc           Last edited by Mj Joseph, Mookie Schroeder on 5/27/2021  1:44 PM. (History) Multiple Minerals-Vitamins Oral Chew Tab Chew 1 tablet by mouth daily. • Calcium 500 MG Oral Chew Tab Chew  by mouth daily.        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 Je Myers, ENT, [facility] occupation: Not Asked        Pt has a pacemaker: No        Pt has a defibrillator: No        Breast feeding: Not Asked        Reaction to local anesthetic: No        Left Handed: No        Right Handed: Yes        Currently spends a great deal of time in t patient is alert, oriented, and appears their stated age.   Patient is well nourished and in no distress    The exam was remarkable for the following:  - there is no evidence of recurrent basal cell carcinoma from the nose, squamous cell carcinoma from righ laterality  Benign neoplasm of skin of trunk, except scrotum  Benign neoplasm of skin of lower limb, including hip, unspecified laterality    No orders of the defined types were placed in this encounter.       Results From Past 48 Hours:  No results found f

## 2021-06-02 ENCOUNTER — TELEPHONE (OUTPATIENT)
Dept: RHEUMATOLOGY | Facility: CLINIC | Age: 76
End: 2021-06-02

## 2021-06-02 NOTE — TELEPHONE ENCOUNTER
These call patient and see if she is still agreeable for the dual-energy CT scan to look for gout. It requires a separate message from me to the radiologist I have to get her okay.

## 2021-06-03 ENCOUNTER — TELEPHONE (OUTPATIENT)
Dept: RHEUMATOLOGY | Facility: CLINIC | Age: 76
End: 2021-06-03

## 2021-06-03 DIAGNOSIS — M15.4 EROSIVE (OSTEO)ARTHRITIS: ICD-10-CM

## 2021-06-03 DIAGNOSIS — M10.9 GOUT OF RIGHT HAND, UNSPECIFIED CAUSE, UNSPECIFIED CHRONICITY: Primary | ICD-10-CM

## 2021-06-03 NOTE — TELEPHONE ENCOUNTER
Phoned pt,  Patient agreeable for the dual-energy CT scan to look for gout.   Will call central scheduling to schedule

## 2021-06-03 NOTE — TELEPHONE ENCOUNTER
Please have the patient call back next Monday or Tuesday to schedule. I ordered the test however I need to send email to Dr. Ayse Nicholson (radiology) so he can coordinate with his techs to be on the look out for the order for this particular study.

## 2021-06-19 ENCOUNTER — HOSPITAL ENCOUNTER (OUTPATIENT)
Dept: CT IMAGING | Facility: HOSPITAL | Age: 76
Discharge: HOME | End: 2021-06-19
Attending: INTERNAL MEDICINE
Payer: MEDICARE

## 2021-06-19 DIAGNOSIS — M15.4 EROSIVE (OSTEO)ARTHRITIS: ICD-10-CM

## 2021-06-19 DIAGNOSIS — M10.9 GOUT OF RIGHT HAND, UNSPECIFIED CAUSE, UNSPECIFIED CHRONICITY: ICD-10-CM

## 2021-06-19 PROCEDURE — 73200 CT UPPER EXTREMITY W/O DYE: CPT | Performed by: INTERNAL MEDICINE

## 2021-06-21 ENCOUNTER — TELEPHONE (OUTPATIENT)
Dept: RHEUMATOLOGY | Facility: CLINIC | Age: 76
End: 2021-06-21

## 2021-06-22 ENCOUNTER — HOSPITAL ENCOUNTER (OUTPATIENT)
Dept: CT IMAGING | Facility: HOSPITAL | Age: 76
Discharge: HOME | End: 2021-06-22
Attending: INTERNAL MEDICINE
Payer: MEDICARE

## 2021-06-22 PROCEDURE — 73200 CT UPPER EXTREMITY W/O DYE: CPT | Performed by: INTERNAL MEDICINE

## 2021-06-22 PROCEDURE — 76377 3D RENDER W/INTRP POSTPROCES: CPT | Performed by: INTERNAL MEDICINE

## 2021-06-23 ENCOUNTER — TELEPHONE (OUTPATIENT)
Dept: RHEUMATOLOGY | Facility: CLINIC | Age: 76
End: 2021-06-23

## 2021-06-23 RX ORDER — ALLOPURINOL 100 MG/1
TABLET ORAL
Qty: 83 TABLET | Refills: 0 | Status: SHIPPED | OUTPATIENT
Start: 2021-06-23 | End: 2021-09-03

## 2021-06-23 NOTE — TELEPHONE ENCOUNTER
Called and discussed DEXA scan showing MSU in right MCP 2, 3. Start allopurinol 50 mg daily for 2 weeks then increase to 100 mg    When she was taking colchicine 0.6 mg she did not feel any better.   Decreased to 0.3 mg daily given she is on statin for gou

## 2021-07-06 DIAGNOSIS — I10 ESSENTIAL HYPERTENSION: ICD-10-CM

## 2021-07-06 RX ORDER — HYDROCHLOROTHIAZIDE 25 MG/1
TABLET ORAL
Qty: 45 TABLET | Refills: 0 | Status: SHIPPED | OUTPATIENT
Start: 2021-07-06 | End: 2021-07-07 | Stop reason: ALTCHOICE

## 2021-07-06 NOTE — TELEPHONE ENCOUNTER
hydroCHLOROthiazide Oral Tablet 25 MG          Will file in chart as: HYDROCHLOROTHIAZIDE 25 MG Oral Tab    Sig: TAKE 1/2 TABLETS BY MOUTH ONE TIME DAILY     Disp:  15 tablet    Refills:  0    Start: 7/6/2021    Class: Normal    Non-formulary For: [employer]

## 2021-07-07 ENCOUNTER — TELEPHONE (OUTPATIENT)
Dept: FAMILY MEDICINE CLINIC | Facility: CLINIC | Age: 76
End: 2021-07-07

## 2021-07-07 RX ORDER — LOSARTAN POTASSIUM AND HYDROCHLOROTHIAZIDE 12.5; 5 MG/1; MG/1
1 TABLET ORAL DAILY
Qty: 90 TABLET | Refills: 0 | Status: SHIPPED | OUTPATIENT
Start: 2021-07-07 | End: 2021-10-04

## 2021-07-07 NOTE — TELEPHONE ENCOUNTER
Zakia from [facility] would like clarification on meds, pls call  [phone]

## 2021-07-07 NOTE — TELEPHONE ENCOUNTER
See phone message below:    Last OV 4/6/21  Patient currently taking Losartan 50mg 1 tablet daily and hydrochlorothiazide 25mg 0.5 tablet daily. Pt requesting combo pill, she states she was advised that it was an option at her last OV.     Rx pended, ple

## 2021-07-07 NOTE — TELEPHONE ENCOUNTER
Patient requesting that her Losartan and hydrochlorothiazide be a combo pill. She said it was a option at her last OV.  Please sent to [facility]

## 2021-07-07 NOTE — TELEPHONE ENCOUNTER
Spoke with Zakia, she asked if this writer saw the Rx for hydrochlorothiazide sent yesterday. This writer informed Zakia pt requested combination pilll today and physician sent that Rx.  Zakia states hydrochlorothiazide was dispensed and sold to pt alre

## 2021-08-16 ENCOUNTER — APPOINTMENT (OUTPATIENT)
Dept: CT IMAGING | Facility: HOSPITAL | Age: 76
End: 2021-08-16
Attending: PHYSICIAN ASSISTANT
Payer: MEDICARE

## 2021-08-16 ENCOUNTER — APPOINTMENT (OUTPATIENT)
Dept: GENERAL RADIOLOGY | Age: 76
End: 2021-08-16
Attending: PHYSICIAN ASSISTANT
Payer: MEDICARE

## 2021-08-16 ENCOUNTER — HOSPITAL ENCOUNTER (OUTPATIENT)
Age: 76
Discharge: HOME | End: 2021-08-16
Payer: MEDICARE

## 2021-08-16 VITALS
TEMPERATURE: 99 F | HEART RATE: 52 BPM | SYSTOLIC BLOOD PRESSURE: 161 MMHG | DIASTOLIC BLOOD PRESSURE: 68 MMHG | BODY MASS INDEX: 24 KG/M2 | RESPIRATION RATE: 18 BRPM | OXYGEN SATURATION: 97 % | WEIGHT: 142 LBS

## 2021-08-16 DIAGNOSIS — S80.02XA HEMATOMA OF LEFT KNEE REGION: ICD-10-CM

## 2021-08-16 DIAGNOSIS — T07.XXXA ABRASIONS OF MULTIPLE SITES: ICD-10-CM

## 2021-08-16 DIAGNOSIS — S80.02XA CONTUSION OF LEFT KNEE, INITIAL ENCOUNTER: ICD-10-CM

## 2021-08-16 DIAGNOSIS — S09.90XA CLOSED HEAD INJURY WITHOUT LOSS OF CONSCIOUSNESS, INITIAL ENCOUNTER: ICD-10-CM

## 2021-08-16 DIAGNOSIS — S00.33XA CONTUSION OF NOSE, INITIAL ENCOUNTER: Primary | ICD-10-CM

## 2021-08-16 DIAGNOSIS — S02.2XXA CLOSED FRACTURE OF NASAL BONE, INITIAL ENCOUNTER: ICD-10-CM

## 2021-08-16 PROCEDURE — 90715 TDAP VACCINE 7 YRS/> IM: CPT | Performed by: PHYSICIAN ASSISTANT

## 2021-08-16 PROCEDURE — 70160 X-RAY EXAM OF NASAL BONES: CPT | Performed by: PHYSICIAN ASSISTANT

## 2021-08-16 PROCEDURE — 70450 CT HEAD/BRAIN W/O DYE: CPT | Performed by: PHYSICIAN ASSISTANT

## 2021-08-16 PROCEDURE — 73562 X-RAY EXAM OF KNEE 3: CPT | Performed by: PHYSICIAN ASSISTANT

## 2021-08-16 PROCEDURE — 99204 OFFICE O/P NEW MOD 45 MIN: CPT | Performed by: PHYSICIAN ASSISTANT

## 2021-08-16 PROCEDURE — 90471 IMMUNIZATION ADMIN: CPT | Performed by: PHYSICIAN ASSISTANT

## 2021-08-16 RX ORDER — ACETAMINOPHEN 325 MG/1
650 TABLET ORAL ONCE
Status: COMPLETED | OUTPATIENT
Start: 2021-08-16 | End: 2021-08-16

## 2021-08-16 NOTE — ED INITIAL ASSESSMENT (HPI)
Tripped on raised sidewalk at approx 1430 today. Abrasion w swelling to nose w bleeding from r nare. Abrasions to r palm & bilat knees w swelling to left knee. Denies LOC. Takes ASA daily.

## 2021-08-16 NOTE — ED PROVIDER NOTES
Patient Seen in: Immediate [address] [place], [place] [address]way      History   Patient presents with:  Trauma: Facial, R palm & bilat knee injury    Stated Complaint: Frankey Guthrie; Face Injury; Knee Injury    HPI/Subjective:   HPI    22-year-old female with a known history of ~2008    R, follows with Dr. Munoz Nurse   • Vitamin D insufficiency 2011              Past Surgical History:   Procedure Laterality Date   • BRAIN SURGERY     • CHEMOTHERAPY  2011   • COCHLEAR IMPLANT EXT SPEECH Right 02/2020   • COLONOSCOPY  5/11/2007, 2013 systems are as noted in HPI. Constitutional and vital signs reviewed. All other systems reviewed and negative except as noted above.     Physical Exam     ED Triage Vitals [08/16/21 1517]   /57   Pulse 53   Resp 18   Temp 98.9 °F (37.2 °C)   Tem hemorrhage. Left eye: No exudate or hemorrhage. Pupils: Pupils are equal, round, and reactive to light. Cardiovascular:      Rate and Rhythm: Normal rate and regular rhythm. Heart sounds: Normal heart sounds.    Pulmonary:      Effort: Pulmon well aerated. Trace leftward nasal septal deviation. Dental hardware. CONCLUSION:  Comminuted minimally displaced fractures of the nasal bones.     Dictated by (CST): Ottoniel Machdao MD on 8/16/2021 at 3:56 PM     Finalized by (CST): Zoraida Barraza right parietal bone. Nonspecific calcifications along the right side of the medulla and lower brainstem are likely related to previous surgery. There are postoperative changes along the right temporal bone and right occipital region.   There is opacificat minimally displaced left nasal bone fracture. Please see above for further details.   Dictated by (CST): Subhash Ventura MD on 8/16/2021 at 5:44 PM     Finalized by (CST): Subhash Ventura MD on 8/16/2021 at 5:48 PM              MDM      Discussed with MICHAEL of left knee region     Disposition:  Discharge  8/16/2021  4:12 pm    Follow-up:  Florian Nguyen MD  [address] [place] [address]  [phone]    Schedule an appointment as soon as possible for a visit in 2 days      EDWARD

## 2021-09-03 ENCOUNTER — OFFICE VISIT (OUTPATIENT)
Dept: RHEUMATOLOGY | Facility: CLINIC | Age: 76
End: 2021-09-03
Payer: MEDICARE

## 2021-09-03 VITALS
SYSTOLIC BLOOD PRESSURE: 140 MMHG | HEART RATE: 63 BPM | HEIGHT: 65 IN | WEIGHT: 141 LBS | OXYGEN SATURATION: 97 % | TEMPERATURE: 97 F | DIASTOLIC BLOOD PRESSURE: 61 MMHG | BODY MASS INDEX: 23.49 KG/M2

## 2021-09-03 DIAGNOSIS — R76.8 SS-A ANTIBODY POSITIVE: ICD-10-CM

## 2021-09-03 DIAGNOSIS — M15.4 EROSIVE OSTEOARTHRITIS: ICD-10-CM

## 2021-09-03 DIAGNOSIS — M10.9 GOUT OF RIGHT HAND, UNSPECIFIED CAUSE, UNSPECIFIED CHRONICITY: Primary | ICD-10-CM

## 2021-09-03 PROCEDURE — 99214 OFFICE O/P EST MOD 30 MIN: CPT | Performed by: INTERNAL MEDICINE

## 2021-09-03 RX ORDER — FEBUXOSTAT 40 MG/1
TABLET, FILM COATED ORAL
Qty: 45 TABLET | Refills: 1 | Status: SHIPPED | OUTPATIENT
Start: 2021-09-03 | End: 2021-12-16

## 2021-09-03 NOTE — PATIENT INSTRUCTIONS
Uloric (febuxostat) 40 mg tab: Take half tab daily for 1 month then 1 tab daily. After 2 weeks on the 1 tab dose get repeat blood work. Will be in the system.

## 2021-09-03 NOTE — PROGRESS NOTES
Rheumatology f/u Patient Note  =====================================================================================================    Today's Visit: 09/03/21    ? Patient presents with: Follow - Up: LOV 05/11/21, had problems with medications.  Stoppe SOB/LH/dizziness with allopurinol 100 then still with sx's on 50. So stopped. No rashes.   3  No improvement in hand OA with the above interventions    5 point ROS negative except noted above    Medications:  febuxostat (ULORIC) 40 MG Oral Tab, Take 0.5 tab ductal and lobular CA, R   • History of shingles 10/2018    R ear/mouth, likely CN V2 distribution   • Hypothyroidism, postradioiodine therapy     was hyperthyroid   • Keratoacanthoma type squamous cell carcinoma of skin 11/2018    right upper medial arm reconstruction   • TONSILLECTOMY  1964     Family History:  Family History   Problem Relation Age of Onset   • Cancer Father         lung cancer   • Cancer Paternal Grandfather         esophagus cancer   • Cancer Sister         uterine cancer   • Ovarian C 01/30/2020    EOPERCENT 2.2 01/30/2020    BAPERCENT 0.5 01/30/2020    NE 3.64 01/30/2020    LYMABS 1.59 01/30/2020    MOABSO 0.50 01/30/2020    EOABSO 0.13 01/30/2020    BAABSO 0.03 01/30/2020     Lab Results   Component Value Date    GLU 90 03/23/2021 the 4th digit is non-specific but could indicate concomitant erosive arthritis.  Correlation with clinical findings is necessary.          9/2020 DEXA  LUMBAR SPINE ANALYSIS RESULTS:       Bone mineral density (BMD) (g/cm2):  1.106     Lumbar T-Score:  0.5 erosive osteoarthritis. However there is only solitary erosion at the right fourth MCP; unusual for rheumatoid arthritis. However there is NO radiographic periarticular osteopenia which one would expect to see with rheumatoid arthritis.  There is also chandana performed utilizing speech recognition software technology. Despite proofreading, speech recognition errors could escape detection. If a word or phrase is confusing or out of context, please do not hesitate to call for   clarification.        Kind regards

## 2021-09-27 ENCOUNTER — TELEPHONE (OUTPATIENT)
Dept: FAMILY MEDICINE CLINIC | Facility: CLINIC | Age: 76
End: 2021-09-27

## 2021-09-27 ENCOUNTER — OFFICE VISIT (OUTPATIENT)
Dept: FAMILY MEDICINE CLINIC | Facility: CLINIC | Age: 76
End: 2021-09-27
Payer: MEDICARE

## 2021-09-27 VITALS
BODY MASS INDEX: 23.16 KG/M2 | OXYGEN SATURATION: 96 % | RESPIRATION RATE: 16 BRPM | WEIGHT: 139 LBS | HEIGHT: 65 IN | TEMPERATURE: 98 F | HEART RATE: 58 BPM | DIASTOLIC BLOOD PRESSURE: 66 MMHG | SYSTOLIC BLOOD PRESSURE: 140 MMHG

## 2021-09-27 DIAGNOSIS — S80.01XD CONTUSION OF RIGHT KNEE, SUBSEQUENT ENCOUNTER: ICD-10-CM

## 2021-09-27 DIAGNOSIS — H71.91 CHOLESTEATOMA OF EAR, RIGHT: ICD-10-CM

## 2021-09-27 DIAGNOSIS — S02.2XXD CLOSED FRACTURE OF NASAL BONE WITH ROUTINE HEALING, SUBSEQUENT ENCOUNTER: Primary | ICD-10-CM

## 2021-09-27 DIAGNOSIS — Z23 NEED FOR VACCINATION: ICD-10-CM

## 2021-09-27 DIAGNOSIS — S06.0X0D CONCUSSION WITHOUT LOSS OF CONSCIOUSNESS, SUBSEQUENT ENCOUNTER: ICD-10-CM

## 2021-09-27 PROCEDURE — 90662 IIV NO PRSV INCREASED AG IM: CPT | Performed by: FAMILY MEDICINE

## 2021-09-27 PROCEDURE — 99214 OFFICE O/P EST MOD 30 MIN: CPT | Performed by: FAMILY MEDICINE

## 2021-09-27 PROCEDURE — G0008 ADMIN INFLUENZA VIRUS VAC: HCPCS | Performed by: FAMILY MEDICINE

## 2021-09-27 NOTE — TELEPHONE ENCOUNTER
Dr. Roderick Craft, is her ENT who did her cholesteotoma surgery. She used to see him 1 q yr, but says it's been 2-3 yrs ago since she last saw him. 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  Jayce Talley

## 2021-09-27 NOTE — PROGRESS NOTES
[facility] Visit Note  9/27/2021      Subjective:      Patient ID: Rossi Morales is a 76year old female.     Chief Complaint:  Patient presents with:  Urgent Care F/u: on 8/16/21 while walking her dog she tripped over an uneven sidewalk and fel age-indeterminate minimally displaced left nasal bone fracture.             Please see above for further details. H/o Drake Rahman- Dr. Vianey Mcfarland, is her ENT that did her cholesteotoma surgery.  She used to see him 1 q yr, but says it's been 2-3

## 2021-09-28 NOTE — TELEPHONE ENCOUNTER
CT report and TE from Dr. Sonia Villa faxed to dr. Natalee Arias office at [phone], confirmation received.

## 2021-09-28 NOTE — TELEPHONE ENCOUNTER
Spoke with Lili at [address] [place] [address] to clarify that Dr. Tommie Person is at the [facility] office, she confirmed that he is at that location.

## 2021-10-04 RX ORDER — LOSARTAN POTASSIUM AND HYDROCHLOROTHIAZIDE 12.5; 5 MG/1; MG/1
TABLET ORAL
Qty: 90 TABLET | Refills: 1 | Status: SHIPPED | OUTPATIENT
Start: 2021-10-04

## 2021-10-04 NOTE — TELEPHONE ENCOUNTER
: Losartan Potassium-HCTZ Oral Tablet 50-12.5 MG          Will file in chart as: LOSARTAN-HYDROCHLOROTHIAZIDE 50-12.5 MG Oral Tab    Sig: TAKE ONE TABLET BY MOUTH ONE TIME DAILY    Disp:  90 tablet    Refills:  0    Start: 10/1/2021    Class: Normal    Non

## 2021-11-18 DIAGNOSIS — I10 BENIGN ESSENTIAL HTN: ICD-10-CM

## 2021-11-19 ENCOUNTER — TELEPHONE (OUTPATIENT)
Dept: FAMILY MEDICINE CLINIC | Facility: CLINIC | Age: 76
End: 2021-11-19

## 2021-11-19 RX ORDER — METOPROLOL SUCCINATE 25 MG/1
TABLET, EXTENDED RELEASE ORAL
Qty: 90 TABLET | Refills: 0 | Status: SHIPPED | OUTPATIENT
Start: 2021-11-19 | End: 2021-12-16

## 2021-11-19 NOTE — TELEPHONE ENCOUNTER
Patient states she has 2 pills left, needs metoprolol refilled by tomorrow, going out of town, please advise.

## 2021-11-19 NOTE — TELEPHONE ENCOUNTER
Metoprolol Succinate ER Oral Tablet Extended Release 24 Hour 25 MG          Will file in chart as: METOPROLOL SUCCINATE 25 MG Oral Tablet 24 Hr    Sig: TAKE ONE TABLET BY MOUTH ONE TIME DAILY    Disp:  90 tablet    Refills:  0    Start: 11/18/2021    Class

## 2021-11-19 NOTE — TELEPHONE ENCOUNTER
Left detailed message per HIPAA form advising that metoprolol was refilled today and should be available for  at [place] [id number] later today. Asked pt to call back with any questions, call back number provided.

## 2021-12-02 ENCOUNTER — OFFICE VISIT (OUTPATIENT)
Dept: DERMATOLOGY CLINIC | Facility: CLINIC | Age: 76
End: 2021-12-02
Payer: MEDICARE

## 2021-12-02 DIAGNOSIS — D22.9 MULTIPLE MELANOCYTIC NEVI: ICD-10-CM

## 2021-12-02 DIAGNOSIS — Z85.828 PERSONAL HISTORY OF SKIN CANCER: Primary | ICD-10-CM

## 2021-12-02 DIAGNOSIS — Z87.2 HISTORY OF ACTINIC KERATOSES: ICD-10-CM

## 2021-12-02 DIAGNOSIS — L57.8 SUN-DAMAGED SKIN: ICD-10-CM

## 2021-12-02 DIAGNOSIS — L71.9 ROSACEA: ICD-10-CM

## 2021-12-02 DIAGNOSIS — Z86.018 HISTORY OF DYSPLASTIC NEVUS: ICD-10-CM

## 2021-12-02 DIAGNOSIS — L82.1 SEBORRHEIC KERATOSES: ICD-10-CM

## 2021-12-02 DIAGNOSIS — L81.4 SOLAR LENTIGO: ICD-10-CM

## 2021-12-02 PROCEDURE — 99213 OFFICE O/P EST LOW 20 MIN: CPT | Performed by: DERMATOLOGY

## 2021-12-02 NOTE — PROGRESS NOTES
HPI:     Chief Complaint     Upper Body Exam        HPI     Upper Body Exam      Additional comments: LOV 5/27/21. pt presenting today with upper body skin check.  pt has HX of AK's,Dyplastic Nevus,BCC          Last edited by MONIKA Medina on 12/2/2 mouth daily. • Calcium 500 MG Oral Chew Tab Chew  by mouth daily.        Allergies:   No Known Allergies    Past Medical History:   Diagnosis Date   • Aortic stenosis     Moderate aortic stenosis, mild TR, last echo 11/20 Dr. 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 Gunjan Ricardo, ENT, [facility] occupation: Not Asked        Pt has a pacemaker: No        Pt has a defibrillator: No        Breast feeding: Not Asked        Reaction to local anesthetic: No        Left Handed: No        Right Handed: Yes        Currently spends a great deal of time in t type        ASSESSMENT/PLAN:   Personal history of skin cancer  (primary encounter diagnosis-no recurrences–I would advise monthly self exams and full exam with Dr. Zach Holder in 6 months.   Patient will come sooner if notes anything new or changing  Seborrheic

## 2021-12-15 DIAGNOSIS — I10 BENIGN ESSENTIAL HTN: ICD-10-CM

## 2021-12-15 DIAGNOSIS — M10.9 GOUT OF RIGHT HAND, UNSPECIFIED CAUSE, UNSPECIFIED CHRONICITY: ICD-10-CM

## 2021-12-16 RX ORDER — METOPROLOL SUCCINATE 25 MG/1
TABLET, EXTENDED RELEASE ORAL
Qty: 90 TABLET | Refills: 0 | Status: SHIPPED | OUTPATIENT
Start: 2021-12-16

## 2021-12-16 RX ORDER — FEBUXOSTAT 40 MG/1
40 TABLET, FILM COATED ORAL DAILY
Qty: 30 TABLET | Refills: 1 | Status: SHIPPED | OUTPATIENT
Start: 2021-12-16 | End: 2022-01-15

## 2021-12-16 NOTE — TELEPHONE ENCOUNTER
Metoprolol Succinate ER Oral Tablet Extended Release 24 Hour 25 MG          Will file in chart as: METOPROLOL SUCCINATE 25 MG Oral Tablet 24 Hr    Sig: TAKE ONE TABLET BY MOUTH ONE TIME DAILY    Disp:  90 tablet    Refills:  0    Start: 12/15/2021    Class

## 2021-12-16 NOTE — TELEPHONE ENCOUNTER
LOV 9-3-21  Future Appointments   Date Time Provider Med Singh   1/24/2022 10:30 AM Hedy Wu MD [facility] Maureen Perez pt to check status; lm on vm to cb. Main dept # given.     Pt returned my call; states she is doing well and does ne

## 2022-01-24 ENCOUNTER — OFFICE VISIT (OUTPATIENT)
Dept: RHEUMATOLOGY | Facility: CLINIC | Age: 77
End: 2022-01-24
Payer: MEDICARE

## 2022-01-24 VITALS
DIASTOLIC BLOOD PRESSURE: 67 MMHG | HEIGHT: 65 IN | SYSTOLIC BLOOD PRESSURE: 135 MMHG | HEART RATE: 58 BPM | BODY MASS INDEX: 23.32 KG/M2 | WEIGHT: 140 LBS | OXYGEN SATURATION: 99 %

## 2022-01-24 DIAGNOSIS — R76.8 SS-A ANTIBODY POSITIVE: ICD-10-CM

## 2022-01-24 DIAGNOSIS — M10.9 GOUT OF RIGHT HAND, UNSPECIFIED CAUSE, UNSPECIFIED CHRONICITY: Primary | ICD-10-CM

## 2022-01-24 DIAGNOSIS — Z51.81 THERAPEUTIC DRUG MONITORING: ICD-10-CM

## 2022-01-24 PROCEDURE — 99214 OFFICE O/P EST MOD 30 MIN: CPT | Performed by: INTERNAL MEDICINE

## 2022-01-24 RX ORDER — ALLOPURINOL 100 MG/1
TABLET ORAL
Qty: 98 TABLET | Refills: 0 | Status: SHIPPED | OUTPATIENT
Start: 2022-01-24 | End: 2022-04-24

## 2022-01-24 NOTE — PROGRESS NOTES
Rheumatology f/u Patient Note  =====================================================================================================  ? Patient presents with: Follow - Up: LOV 09/03/21, has been feeling good since last visit.  Has not had any issues sin interventions      ==============================================================================================================  Today's Visit: 01/24/22    Doing well. Joint pains are better.   The febuxostat is 40 mg daily: She is tolerating well but co 02/2020    R side   • Dysplastic nevus 2019    right ear pinna, Full Thickness Skin Graft to right post auricular    • Essential hypertension    • Exposure to medical diagnostic radiation    • High cholesterol    • History of postmenopausal HRT     ~ 5 yrs (diagnostic)   • OTHER SURGICAL HISTORY  1981    thyroid surgery, 1981-radioactive iodine ablation   • OTHER SURGICAL HISTORY  Nov. 2013    abutment placed in right skull for hearing loss   • RADIATION RIGHT  2011   • SKIN TISSUE REARRANGEMENT Left 01/12/2 LYMPHABS 0.5 (L) 07/26/2011    NEUT 72 07/26/2011    LYMPH 4 (L) 07/26/2011    MON 2 (L) 07/26/2011    EOS 4 07/05/2011    NEPERCENT 53.9 03/23/2021    LYPERCENT 30.3 03/23/2021    MOPERCENT 12.0 03/23/2021    EOPERCENT 3.0 03/23/2021    BAPERCENT 0.8 03/2 Osteoarthritis involving all of the interphalangeal joints and the 1st carpometacarpal joint.    2. Single periarticular erosion evident radial base proximal phalange of the 4th digit is non-specific but could indicate concomitant erosive arthritis.  Correl evidence of hand osteoarthritis and gout arthritis. She does have central erosions at the PIPs and DIPs which points to erosive osteoarthritis. However there is only solitary erosion at the right fourth MCP; unusual for rheumatoid arthritis.   However ther days, THEN 1.5 tablets (150 mg total) daily x 2 weeks, then get bloodwork.   -Losartan is a mild uricosuric which can decrease serum uric acid.   Hydrochlorothiazide can increase serum uric acid  -We will discuss with PCP if hydrochlorothiazide can be stopp

## 2022-01-24 NOTE — PATIENT INSTRUCTIONS
For now stop febuxostat. We will investigate with insurance. Allopurinol: Take 0.5 tablets (50 mg total) by mouth daily for 30 days, THEN 1 tablet (100 mg total) daily for 15 days, THEN 1.5 tablets (150 mg total) daily x 2 weeks, then get bloodwork.

## 2022-03-16 RX ORDER — LOSARTAN POTASSIUM AND HYDROCHLOROTHIAZIDE 12.5; 5 MG/1; MG/1
TABLET ORAL
Qty: 90 TABLET | Refills: 0 | Status: SHIPPED | OUTPATIENT
Start: 2022-03-16 | End: 2022-03-22 | Stop reason: ALTCHOICE

## 2022-03-22 ENCOUNTER — OFFICE VISIT (OUTPATIENT)
Dept: FAMILY MEDICINE CLINIC | Facility: CLINIC | Age: 77
End: 2022-03-22
Payer: MEDICARE

## 2022-03-22 VITALS
WEIGHT: 141 LBS | SYSTOLIC BLOOD PRESSURE: 126 MMHG | DIASTOLIC BLOOD PRESSURE: 64 MMHG | RESPIRATION RATE: 16 BRPM | OXYGEN SATURATION: 97 % | HEIGHT: 64.5 IN | BODY MASS INDEX: 23.78 KG/M2 | HEART RATE: 64 BPM | TEMPERATURE: 98 F

## 2022-03-22 DIAGNOSIS — Z13.31 DEPRESSION SCREENING: ICD-10-CM

## 2022-03-22 DIAGNOSIS — Z79.899 MEDICATION MANAGEMENT: ICD-10-CM

## 2022-03-22 DIAGNOSIS — Z78.0 ASYMPTOMATIC MENOPAUSAL STATE: ICD-10-CM

## 2022-03-22 DIAGNOSIS — E03.9 ACQUIRED HYPOTHYROIDISM: ICD-10-CM

## 2022-03-22 DIAGNOSIS — M1A.0490 CHRONIC GOUT OF HAND, UNSPECIFIED CAUSE, UNSPECIFIED LATERALITY: ICD-10-CM

## 2022-03-22 DIAGNOSIS — E55.9 VITAMIN D INSUFFICIENCY: ICD-10-CM

## 2022-03-22 DIAGNOSIS — C50.911 MALIGNANT NEOPLASM OF RIGHT FEMALE BREAST, UNSPECIFIED ESTROGEN RECEPTOR STATUS, UNSPECIFIED SITE OF BREAST (HCC): ICD-10-CM

## 2022-03-22 DIAGNOSIS — E78.5 DYSLIPIDEMIA: ICD-10-CM

## 2022-03-22 DIAGNOSIS — I10 BENIGN ESSENTIAL HTN: ICD-10-CM

## 2022-03-22 DIAGNOSIS — M85.80 OSTEOPENIA, UNSPECIFIED LOCATION: ICD-10-CM

## 2022-03-22 DIAGNOSIS — Z00.00 ENCOUNTER FOR ANNUAL HEALTH EXAMINATION: Primary | ICD-10-CM

## 2022-03-22 PROCEDURE — G0439 PPPS, SUBSEQ VISIT: HCPCS | Performed by: FAMILY MEDICINE

## 2022-03-22 PROCEDURE — G0444 DEPRESSION SCREEN ANNUAL: HCPCS | Performed by: FAMILY MEDICINE

## 2022-03-22 RX ORDER — LOSARTAN POTASSIUM 25 MG/1
25 TABLET ORAL DAILY
Qty: 90 TABLET | Refills: 0 | Status: SHIPPED | OUTPATIENT
Start: 2022-03-22

## 2022-03-22 RX ORDER — LOSARTAN POTASSIUM 50 MG/1
50 TABLET ORAL DAILY
Qty: 90 TABLET | Refills: 1 | Status: SHIPPED | OUTPATIENT
Start: 2022-03-22 | End: 2022-03-22 | Stop reason: CLARIF

## 2022-03-22 RX ORDER — METOPROLOL SUCCINATE 50 MG/1
50 TABLET, EXTENDED RELEASE ORAL DAILY
Qty: 30 TABLET | Refills: 0 | Status: SHIPPED | OUTPATIENT
Start: 2022-03-22

## 2022-03-22 RX ORDER — METOPROLOL SUCCINATE 25 MG/1
25 TABLET, EXTENDED RELEASE ORAL DAILY
Qty: 90 TABLET | Refills: 1 | Status: CANCELLED | OUTPATIENT
Start: 2022-03-22

## 2022-03-22 RX ORDER — ATORVASTATIN CALCIUM 10 MG/1
10 TABLET, FILM COATED ORAL NIGHTLY
Qty: 90 TABLET | Refills: 3 | Status: SHIPPED | OUTPATIENT
Start: 2022-03-22

## 2022-03-22 RX ORDER — LEVOTHYROXINE SODIUM 88 UG/1
88 TABLET ORAL
Qty: 90 TABLET | Refills: 3 | Status: SHIPPED | OUTPATIENT
Start: 2022-03-22

## 2022-03-25 ENCOUNTER — TELEPHONE (OUTPATIENT)
Dept: RHEUMATOLOGY | Facility: CLINIC | Age: 77
End: 2022-03-25

## 2022-03-25 LAB
ABSOLUTE BASOPHILS: 29 CELLS/UL (ref 0–200)
ABSOLUTE EOSINOPHILS: 131 CELLS/UL (ref 15–500)
ABSOLUTE LYMPHOCYTES: 1242 CELLS/UL (ref 850–3900)
ABSOLUTE MONOCYTES: 513 CELLS/UL (ref 200–950)
ABSOLUTE NEUTROPHILS: 2185 CELLS/UL (ref 1500–7800)
BASOPHILS: 0.7 %
CHOL/HDLC RATIO: 2.8 (CALC)
CHOLESTEROL, TOTAL: 133 MG/DL
EOSINOPHILS: 3.2 %
HDL CHOLESTEROL: 47 MG/DL
HEMATOCRIT: 38.2 % (ref 35–45)
HEMOGLOBIN: 12.6 G/DL (ref 11.7–15.5)
LDL-CHOLESTEROL: 70 MG/DL (CALC)
LYMPHOCYTES: 30.3 %
MCH: 29.3 PG (ref 27–33)
MCHC: 33 G/DL (ref 32–36)
MCV: 88.8 FL (ref 80–100)
MONOCYTES: 12.5 %
MPV: 9.8 FL (ref 7.5–12.5)
NEUTROPHILS: 53.3 %
NON-HDL CHOLESTEROL: 86 MG/DL (CALC)
PLATELET COUNT: 271 THOUSAND/UL (ref 140–400)
RDW: 13.2 % (ref 11–15)
RED BLOOD CELL COUNT: 4.3 MILLION/UL (ref 3.8–5.1)
TRIGLYCERIDES: 84 MG/DL
TSH: 1.58 MIU/L (ref 0.4–4.5)
VITAMIN D, 25-OH, TOTAL: 54 NG/ML (ref 30–100)
WHITE BLOOD CELL COUNT: 4.1 THOUSAND/UL (ref 3.8–10.8)

## 2022-03-25 NOTE — TELEPHONE ENCOUNTER
Please call [facility], please call to add on monoclonal protein study (SPEP), RF, C3,C4. These were not added on.

## 2022-03-28 ENCOUNTER — TELEPHONE (OUTPATIENT)
Dept: RHEUMATOLOGY | Facility: CLINIC | Age: 77
End: 2022-03-28

## 2022-03-28 DIAGNOSIS — M10.9 GOUT OF RIGHT HAND, UNSPECIFIED CAUSE, UNSPECIFIED CHRONICITY: Primary | ICD-10-CM

## 2022-03-28 NOTE — TELEPHONE ENCOUNTER
Phoned pt, received lab orders via mail. Requesting I fax outstanding labs to [facility]. Pt also requesting refill of allopurinol. States she increased medication per Dr. Lee Montes instructions. But after having increased foot/toe pain increased to 2 tabs daily. Reviewed Dr. Neymar Sarmiento medication instructions with pt. Pt voiced understanding, but states by taking the 2 tabs daily, pain was relieved.

## 2022-03-29 LAB
ALBUMIN/GLOBULIN RATIO: 0.7 (CALC) (ref 1–2.5)
ALBUMIN: 3.6 G/DL (ref 3.6–5.1)
ALBUMIN: 3.6 G/DL (ref 3.8–4.8)
ALKALINE PHOSPHATASE: 98 U/L (ref 37–153)
ALPHA-1-GLOBULINS: 0.3 G/DL (ref 0.2–0.3)
ALPHA-2-GLOBULINS: 0.7 G/DL (ref 0.5–0.9)
ALT: 15 U/L (ref 6–29)
BETA 1 GLOBULIN: 0.5 G/DL (ref 0.4–0.6)
BETA 2 GLOBULIN: 0.4 G/DL (ref 0.2–0.5)
BILIRUBIN, TOTAL: 0.5 MG/DL (ref 0.2–1.2)
BUN: 19 MG/DL (ref 7–25)
CALCIUM: 9.6 MG/DL (ref 8.6–10.4)
CARBON DIOXIDE: 32 MMOL/L (ref 20–32)
CHLORIDE: 101 MMOL/L (ref 98–110)
COMPLEMENT COMPONENT C3C: 133 MG/DL (ref 83–193)
COMPLEMENT COMPONENT C4C: 23 MG/DL (ref 15–57)
EGFR IF AFRICN AM: 81 ML/MIN/1.73M2
EGFR IF NONAFRICN AM: 70 ML/MIN/1.73M2
GAMMA GLOBULINS: 3.3 G/DL (ref 0.8–1.7)
GLOBULIN: 5.1 G/DL (CALC) (ref 1.9–3.7)
GLUCOSE: 93 MG/DL (ref 65–99)
POTASSIUM: 5.2 MMOL/L (ref 3.5–5.3)
PROTEIN, TOTAL: 8.7 G/DL (ref 6.1–8.1)
PROTEIN, TOTAL: 8.7 G/DL (ref 6.1–8.1)
RHEUMATOID FACTOR: 98 IU/ML
SODIUM: 136 MMOL/L (ref 135–146)
URIC ACID: 4 MG/DL (ref 2.5–7)

## 2022-03-29 RX ORDER — ALLOPURINOL 100 MG/1
200 TABLET ORAL DAILY
Qty: 180 TABLET | Refills: 1 | Status: SHIPPED | OUTPATIENT
Start: 2022-03-29 | End: 2022-08-31

## 2022-03-29 NOTE — TELEPHONE ENCOUNTER
Called pt and notified Dr. Indra Porter refilled allopurinol 200 mg daily  Let her know that it is ok to wait on getting labs until June, non-urgent. She can get labs after she sees Dr. Haydee Bullock [facility]) if case she has additional orders   Pt appreciative and voiced understanding.

## 2022-03-29 NOTE — TELEPHONE ENCOUNTER
Call pt:  Refilled allopurinol 200 mg daily  Let her know ok to wait on getting labs until June, non-urgent.  She can get labs after she sees Dr. Mushtaq Ramos [facility]) if case she has additional orders

## 2022-04-07 ENCOUNTER — HOSPITAL ENCOUNTER (OUTPATIENT)
Dept: MAMMOGRAPHY | Facility: HOSPITAL | Age: 77
Discharge: HOME | End: 2022-04-07
Attending: INTERNAL MEDICINE
Payer: MEDICARE

## 2022-04-07 DIAGNOSIS — Z01.419 WELL WOMAN EXAM: ICD-10-CM

## 2022-04-07 DIAGNOSIS — Z12.31 ENCOUNTER FOR SCREENING MAMMOGRAM FOR MALIGNANT NEOPLASM OF BREAST: ICD-10-CM

## 2022-04-07 PROCEDURE — 77063 BREAST TOMOSYNTHESIS BI: CPT | Performed by: INTERNAL MEDICINE

## 2022-04-07 PROCEDURE — 77067 SCR MAMMO BI INCL CAD: CPT | Performed by: INTERNAL MEDICINE

## 2022-04-13 ENCOUNTER — OFFICE VISIT (OUTPATIENT)
Dept: FAMILY MEDICINE CLINIC | Facility: CLINIC | Age: 77
End: 2022-04-13
Payer: MEDICARE

## 2022-04-13 VITALS
SYSTOLIC BLOOD PRESSURE: 124 MMHG | HEART RATE: 56 BPM | OXYGEN SATURATION: 96 % | WEIGHT: 140.38 LBS | HEIGHT: 64.5 IN | DIASTOLIC BLOOD PRESSURE: 60 MMHG | RESPIRATION RATE: 16 BRPM | BODY MASS INDEX: 23.68 KG/M2 | TEMPERATURE: 98 F

## 2022-04-13 DIAGNOSIS — I10 BENIGN ESSENTIAL HTN: ICD-10-CM

## 2022-04-13 PROCEDURE — 99213 OFFICE O/P EST LOW 20 MIN: CPT | Performed by: FAMILY MEDICINE

## 2022-04-13 RX ORDER — METOPROLOL SUCCINATE 50 MG/1
50 TABLET, EXTENDED RELEASE ORAL DAILY
Qty: 90 TABLET | Refills: 1 | Status: SHIPPED | OUTPATIENT
Start: 2022-04-13

## 2022-04-13 RX ORDER — LOSARTAN POTASSIUM 25 MG/1
25 TABLET ORAL DAILY
Qty: 90 TABLET | Refills: 1 | Status: SHIPPED | OUTPATIENT
Start: 2022-04-13

## 2022-04-19 ENCOUNTER — PATIENT MESSAGE (OUTPATIENT)
Dept: FAMILY MEDICINE CLINIC | Facility: CLINIC | Age: 77
End: 2022-04-19

## 2022-04-20 NOTE — TELEPHONE ENCOUNTER
From: Leticia Gusman  To: Hien Osuna MD  Sent: 4/19/2022 7:12 PM CDT  Subject: Jose Francisco Zafar,  I am going on a cruise in June. Could you please prescribe a anti-nausea patch?     Thank you,  Venus Montes

## 2022-04-29 ENCOUNTER — PATIENT MESSAGE (OUTPATIENT)
Dept: FAMILY MEDICINE CLINIC | Facility: CLINIC | Age: 77
End: 2022-04-29

## 2022-04-29 RX ORDER — DOXYCYCLINE HYCLATE 100 MG
100 TABLET ORAL 2 TIMES DAILY
Qty: 28 TABLET | Refills: 0 | Status: SHIPPED | OUTPATIENT
Start: 2022-04-29 | End: 2022-05-13

## 2022-04-29 RX ORDER — SCOLOPAMINE TRANSDERMAL SYSTEM 1 MG/1
1 PATCH, EXTENDED RELEASE TRANSDERMAL
Qty: 4 PATCH | Refills: 0 | Status: SHIPPED | OUTPATIENT
Start: 2022-04-29

## 2022-04-29 NOTE — TELEPHONE ENCOUNTER
From: Rob Fox  To: Gino Weathers MD  Sent: 4/29/2022 10:01 AM CDT  Subject: Tick Bite    I had a tick yesterday which we removed. Please see the attached picture of what the bite site looks like currently. Is this okay or do I need an antibiotic?     Thank you,  Tula Seip

## 2022-05-18 ENCOUNTER — OFFICE VISIT (OUTPATIENT)
Dept: HEMATOLOGY/ONCOLOGY | Facility: HOSPITAL | Age: 77
End: 2022-05-18
Attending: INTERNAL MEDICINE
Payer: MEDICARE

## 2022-05-18 VITALS
OXYGEN SATURATION: 96 % | RESPIRATION RATE: 16 BRPM | SYSTOLIC BLOOD PRESSURE: 133 MMHG | WEIGHT: 137 LBS | TEMPERATURE: 97 F | BODY MASS INDEX: 22.82 KG/M2 | DIASTOLIC BLOOD PRESSURE: 70 MMHG | HEART RATE: 63 BPM | HEIGHT: 65 IN

## 2022-05-18 DIAGNOSIS — Z12.31 VISIT FOR SCREENING MAMMOGRAM: Primary | ICD-10-CM

## 2022-05-18 DIAGNOSIS — Z12.31 ENCOUNTER FOR SCREENING MAMMOGRAM FOR MALIGNANT NEOPLASM OF BREAST: ICD-10-CM

## 2022-05-18 PROCEDURE — 99213 OFFICE O/P EST LOW 20 MIN: CPT | Performed by: INTERNAL MEDICINE

## 2022-05-18 RX ORDER — VALACYCLOVIR HYDROCHLORIDE 1 G/1
TABLET, FILM COATED ORAL
COMMUNITY
Start: 2022-04-30

## 2022-05-18 NOTE — PROGRESS NOTES
Education Record    Learner:  Patient    Disease / Diagnosis:right breast cancer    Barriers / Limitations:  None   Comments:    Method:  Discussion   Comments:    General Topics:  Plan of care reviewed   Comments:    Outcome:  Shows understanding   Comments:  Breast imaging up to date. Pt feeling well.

## 2022-05-24 ENCOUNTER — TELEPHONE (OUTPATIENT)
Dept: RHEUMATOLOGY | Facility: CLINIC | Age: 77
End: 2022-05-24

## 2022-05-24 DIAGNOSIS — Z51.81 THERAPEUTIC DRUG MONITORING: ICD-10-CM

## 2022-05-24 DIAGNOSIS — R76.8 POSITIVE ANA (ANTINUCLEAR ANTIBODY): ICD-10-CM

## 2022-05-24 DIAGNOSIS — R76.8 SS-A ANTIBODY POSITIVE: ICD-10-CM

## 2022-05-24 DIAGNOSIS — M10.9 GOUT OF RIGHT HAND, UNSPECIFIED CAUSE, UNSPECIFIED CHRONICITY: Primary | ICD-10-CM

## 2022-05-24 NOTE — TELEPHONE ENCOUNTER
Pt phoned office, would like to have blood work completed at [facility]. Would like us to send labs.

## 2022-05-27 NOTE — TELEPHONE ENCOUNTER
Pt phoned office states she is at [facility] and they do not have lab orders.  Request that we re fax to [phone]

## 2022-05-30 LAB
ABSOLUTE BASOPHILS: 8 CELLS/UL (ref 0–200)
ABSOLUTE EOSINOPHILS: 88 CELLS/UL (ref 15–500)
ABSOLUTE LYMPHOCYTES: 1080 CELLS/UL (ref 850–3900)
ABSOLUTE MONOCYTES: 396 CELLS/UL (ref 200–950)
ABSOLUTE NEUTROPHILS: 2428 CELLS/UL (ref 1500–7800)
ALBUMIN/GLOBULIN RATIO: 0.7 (CALC) (ref 1–2.5)
ALBUMIN: 3.4 G/DL (ref 3.6–5.1)
ALKALINE PHOSPHATASE: 101 U/L (ref 37–153)
ALT: 12 U/L (ref 6–29)
APPEARANCE: CLEAR
AST: 19 U/L (ref 10–35)
BASOPHILS: 0.2 %
BILIRUBIN, TOTAL: 0.3 MG/DL (ref 0.2–1.2)
BILIRUBIN: NEGATIVE
BUN: 18 MG/DL (ref 7–25)
C-REACTIVE PROTEIN: 10.8 MG/L
CALCIUM: 9.1 MG/DL (ref 8.6–10.4)
CARBON DIOXIDE: 32 MMOL/L (ref 20–32)
CHLORIDE: 102 MMOL/L (ref 98–110)
COLOR: YELLOW
COMPLEMENT COMPONENT C3C: 101 MG/DL (ref 83–193)
COMPLEMENT COMPONENT C4C: 22 MG/DL (ref 15–57)
CREATININE, RANDOM URINE: 68 MG/DL (ref 20–275)
CREATININE: 0.62 MG/DL (ref 0.6–0.93)
EGFR IF AFRICN AM: 102 ML/MIN/1.73M2
EGFR IF NONAFRICN AM: 88 ML/MIN/1.73M2
EOSINOPHILS: 2.2 %
GLOBULIN: 4.9 G/DL (CALC) (ref 1.9–3.7)
GLUCOSE: 123 MG/DL (ref 65–99)
GLUCOSE: NEGATIVE
HEMATOCRIT: 36.8 % (ref 35–45)
HEMOGLOBIN: 12.1 G/DL (ref 11.7–15.5)
KETONES: NEGATIVE
LYMPHOCYTES: 27 %
MCH: 29.7 PG (ref 27–33)
MCHC: 32.9 G/DL (ref 32–36)
MCV: 90.4 FL (ref 80–100)
MONOCYTES: 9.9 %
MPV: 9.9 FL (ref 7.5–12.5)
NEUTROPHILS: 60.7 %
NITRITE: NEGATIVE
PH: 6.5 (ref 5–8)
PLATELET COUNT: 252 THOUSAND/UL (ref 140–400)
POTASSIUM: 4.2 MMOL/L (ref 3.5–5.3)
PROTEIN, TOTAL, RANDOM UR: 19 MG/DL (ref 5–24)
PROTEIN, TOTAL: 8.3 G/DL (ref 6.1–8.1)
PROTEIN: NEGATIVE
RDW: 13.1 % (ref 11–15)
RED BLOOD CELL COUNT: 4.07 MILLION/UL (ref 3.8–5.1)
SODIUM: 137 MMOL/L (ref 135–146)
SPECIFIC GRAVITY: 1.01 (ref 1–1.03)
WHITE BLOOD CELL COUNT: 4 THOUSAND/UL (ref 3.8–10.8)

## 2022-05-31 ENCOUNTER — TELEPHONE (OUTPATIENT)
Dept: RHEUMATOLOGY | Facility: CLINIC | Age: 77
End: 2022-05-31

## 2022-05-31 DIAGNOSIS — N39.0 URINARY TRACT INFECTION WITHOUT HEMATURIA, SITE UNSPECIFIED: Primary | ICD-10-CM

## 2022-05-31 RX ORDER — SULFAMETHOXAZOLE AND TRIMETHOPRIM 800; 160 MG/1; MG/1
1 TABLET ORAL 2 TIMES DAILY
Qty: 6 TABLET | Refills: 0 | Status: SHIPPED | OUTPATIENT
Start: 2022-05-31 | End: 2022-06-06

## 2022-05-31 NOTE — TELEPHONE ENCOUNTER
Please call patient, urine culture and U/A concerning for UTI. Any fevers, back pain, dysuria, painful urination, or increased frequency? I prescribed bactrim DS tab, take BID x 3 days. Also please call [facility] to add on SPEP (monoclonal protein study) and uric acid as these two were not drawn.     [facility] Protein, Total and Protein (SPEP) Electrophoresis  Test Code  747   CPT Code(s)  F8503112, 79834

## 2022-05-31 NOTE — TELEPHONE ENCOUNTER
Pt returned my call notified urine culture and U/A concerning for UTI. Denies fevers, back pain, dysuria, and painful urination. Pt felt like something was going on, complains of increased frequency in urination. Pt aware Dr. Joy Henry has prescribed bactrim DS tab, take BID x 3 days. Pt will call with worsening signs and symptoms. Voiced understanding.

## 2022-06-04 LAB
ABSOLUTE BASOPHILS: 8 CELLS/UL (ref 0–200)
ABSOLUTE EOSINOPHILS: 88 CELLS/UL (ref 15–500)
ABSOLUTE LYMPHOCYTES: 1080 CELLS/UL (ref 850–3900)
ABSOLUTE MONOCYTES: 396 CELLS/UL (ref 200–950)
ABSOLUTE NEUTROPHILS: 2428 CELLS/UL (ref 1500–7800)
ALBUMIN/GLOBULIN RATIO: 0.7 (CALC) (ref 1–2.5)
ALBUMIN: 3.2 G/DL (ref 3.8–4.8)
ALBUMIN: 3.4 G/DL (ref 3.6–5.1)
ALKALINE PHOSPHATASE: 101 U/L (ref 37–153)
ALPHA-1-GLOBULINS: 0.3 G/DL (ref 0.2–0.3)
ALPHA-2-GLOBULINS: 0.8 G/DL (ref 0.5–0.9)
ALT: 12 U/L (ref 6–29)
APPEARANCE: CLEAR
AST: 19 U/L (ref 10–35)
BASOPHILS: 0.2 %
BETA 1 GLOBULIN: 0.5 G/DL (ref 0.4–0.6)
BETA 2 GLOBULIN: 0.4 G/DL (ref 0.2–0.5)
BILIRUBIN, TOTAL: 0.3 MG/DL (ref 0.2–1.2)
BILIRUBIN: NEGATIVE
BUN: 18 MG/DL (ref 7–25)
C-REACTIVE PROTEIN: 10.8 MG/L
CALCIUM: 9.1 MG/DL (ref 8.6–10.4)
CARBON DIOXIDE: 32 MMOL/L (ref 20–32)
CHLORIDE: 102 MMOL/L (ref 98–110)
COLOR: YELLOW
COMPLEMENT COMPONENT C3C: 101 MG/DL (ref 83–193)
COMPLEMENT COMPONENT C4C: 22 MG/DL (ref 15–57)
CREATININE, RANDOM URINE: 68 MG/DL (ref 20–275)
CREATININE: 0.62 MG/DL (ref 0.6–0.93)
EGFR IF AFRICN AM: 102 ML/MIN/1.73M2
EGFR IF NONAFRICN AM: 88 ML/MIN/1.73M2
EOSINOPHILS: 2.2 %
GAMMA GLOBULINS: 3.1 G/DL (ref 0.8–1.7)
GLOBULIN: 4.9 G/DL (CALC) (ref 1.9–3.7)
GLUCOSE: 123 MG/DL (ref 65–99)
GLUCOSE: NEGATIVE
HEMATOCRIT: 36.8 % (ref 35–45)
HEMOGLOBIN: 12.1 G/DL (ref 11.7–15.5)
KETONES: NEGATIVE
LYMPHOCYTES: 27 %
MCH: 29.7 PG (ref 27–33)
MCHC: 32.9 G/DL (ref 32–36)
MCV: 90.4 FL (ref 80–100)
MONOCYTES: 9.9 %
MPV: 9.9 FL (ref 7.5–12.5)
NEUTROPHILS: 60.7 %
NITRITE: NEGATIVE
PH: 6.5 (ref 5–8)
PLATELET COUNT: 252 THOUSAND/UL (ref 140–400)
POTASSIUM: 4.2 MMOL/L (ref 3.5–5.3)
PROTEIN, TOTAL, RANDOM UR: 19 MG/DL (ref 5–24)
PROTEIN, TOTAL: 8.3 G/DL (ref 6.1–8.1)
PROTEIN, TOTAL: 8.3 G/DL (ref 6.1–8.1)
PROTEIN: NEGATIVE
RDW: 13.1 % (ref 11–15)
RED BLOOD CELL COUNT: 4.07 MILLION/UL (ref 3.8–5.1)
SODIUM: 137 MMOL/L (ref 135–146)
SPECIFIC GRAVITY: 1.01 (ref 1–1.03)
URIC ACID: 3.4 MG/DL (ref 2.5–7)
WHITE BLOOD CELL COUNT: 4 THOUSAND/UL (ref 3.8–10.8)

## 2022-06-06 ENCOUNTER — TELEPHONE (OUTPATIENT)
Dept: RHEUMATOLOGY | Facility: CLINIC | Age: 77
End: 2022-06-06

## 2022-06-06 DIAGNOSIS — N39.0 URINARY TRACT INFECTION WITHOUT HEMATURIA, SITE UNSPECIFIED: ICD-10-CM

## 2022-06-06 RX ORDER — SULFAMETHOXAZOLE AND TRIMETHOPRIM 800; 160 MG/1; MG/1
1 TABLET ORAL 2 TIMES DAILY
Qty: 6 TABLET | Refills: 0 | Status: SHIPPED | OUTPATIENT
Start: 2022-06-06

## 2022-06-06 NOTE — TELEPHONE ENCOUNTER
Pt will be going out of town, UTI improved after taking antibiotics as prescribed. Pt wondering if she could receive another prescription to take with her in case symptoms return.

## 2022-08-31 DIAGNOSIS — M10.9 GOUT OF RIGHT HAND, UNSPECIFIED CAUSE, UNSPECIFIED CHRONICITY: ICD-10-CM

## 2022-08-31 RX ORDER — ALLOPURINOL 100 MG/1
TABLET ORAL
Qty: 180 TABLET | Refills: 0 | Status: SHIPPED | OUTPATIENT
Start: 2022-08-31

## 2022-08-31 NOTE — TELEPHONE ENCOUNTER
Future Appointments   Date Time Provider Med Samantha   10/24/2022  1:30 PM Dave Bergman MD [facility]     Last office visit - 1/24/2022  Last labs taken - 5/31/2022

## 2022-09-01 DIAGNOSIS — M10.9 GOUT OF RIGHT HAND, UNSPECIFIED CAUSE, UNSPECIFIED CHRONICITY: ICD-10-CM

## 2022-09-01 RX ORDER — ALLOPURINOL 100 MG/1
TABLET ORAL
Qty: 180 TABLET | Refills: 0 | OUTPATIENT
Start: 2022-09-01

## 2022-09-01 NOTE — TELEPHONE ENCOUNTER
Future Appointments   Date Time Provider Med Samantha   10/24/2022  1:30 PM Dave Bergman MD [facility]     LOV 1/24/22  RTO in 9mo

## 2022-09-26 ENCOUNTER — HOSPITAL ENCOUNTER (OUTPATIENT)
Dept: BONE DENSITY | Age: 77
Discharge: HOME | End: 2022-09-26
Attending: FAMILY MEDICINE

## 2022-09-26 DIAGNOSIS — Z79.899 MEDICATION MANAGEMENT: ICD-10-CM

## 2022-09-26 DIAGNOSIS — Z78.0 ASYMPTOMATIC MENOPAUSAL STATE: ICD-10-CM

## 2022-09-26 DIAGNOSIS — M85.80 OSTEOPENIA, UNSPECIFIED LOCATION: ICD-10-CM

## 2022-09-26 PROCEDURE — 77080 DXA BONE DENSITY AXIAL: CPT | Performed by: FAMILY MEDICINE

## 2022-10-11 ENCOUNTER — OFFICE VISIT (OUTPATIENT)
Dept: FAMILY MEDICINE CLINIC | Facility: CLINIC | Age: 77
End: 2022-10-11
Payer: MEDICARE

## 2022-10-11 VITALS
TEMPERATURE: 98 F | SYSTOLIC BLOOD PRESSURE: 124 MMHG | RESPIRATION RATE: 16 BRPM | HEART RATE: 61 BPM | DIASTOLIC BLOOD PRESSURE: 60 MMHG | OXYGEN SATURATION: 97 % | WEIGHT: 137 LBS | HEIGHT: 65 IN | BODY MASS INDEX: 22.82 KG/M2

## 2022-10-11 DIAGNOSIS — R73.09 ELEVATED GLUCOSE: ICD-10-CM

## 2022-10-11 DIAGNOSIS — Z23 NEED FOR VACCINATION: ICD-10-CM

## 2022-10-11 DIAGNOSIS — I10 BENIGN ESSENTIAL HTN: ICD-10-CM

## 2022-10-11 DIAGNOSIS — E89.0 HYPOTHYROIDISM, POSTRADIOIODINE THERAPY: ICD-10-CM

## 2022-10-11 DIAGNOSIS — E78.00 HIGH CHOLESTEROL: ICD-10-CM

## 2022-10-11 DIAGNOSIS — M85.89 OSTEOPENIA OF MULTIPLE SITES: Primary | ICD-10-CM

## 2022-10-11 DIAGNOSIS — E55.9 VITAMIN D INSUFFICIENCY: ICD-10-CM

## 2022-10-11 PROCEDURE — G0008 ADMIN INFLUENZA VIRUS VAC: HCPCS | Performed by: FAMILY MEDICINE

## 2022-10-11 PROCEDURE — 90662 IIV NO PRSV INCREASED AG IM: CPT | Performed by: FAMILY MEDICINE

## 2022-10-11 PROCEDURE — 99213 OFFICE O/P EST LOW 20 MIN: CPT | Performed by: FAMILY MEDICINE

## 2022-10-11 RX ORDER — METOPROLOL SUCCINATE 50 MG/1
TABLET, EXTENDED RELEASE ORAL
Qty: 90 TABLET | Refills: 0 | OUTPATIENT
Start: 2022-10-11

## 2022-10-11 RX ORDER — METOPROLOL SUCCINATE 50 MG/1
50 TABLET, EXTENDED RELEASE ORAL DAILY
Qty: 90 TABLET | Refills: 1 | Status: SHIPPED | OUTPATIENT
Start: 2022-10-11

## 2022-10-11 RX ORDER — LOSARTAN POTASSIUM 25 MG/1
25 TABLET ORAL DAILY
Qty: 90 TABLET | Refills: 1 | Status: SHIPPED | OUTPATIENT
Start: 2022-10-11

## 2022-10-11 RX ORDER — LOSARTAN POTASSIUM 25 MG/1
25 TABLET ORAL DAILY
Qty: 90 TABLET | Refills: 1 | Status: CANCELLED | OUTPATIENT
Start: 2022-10-11

## 2022-10-24 ENCOUNTER — OFFICE VISIT (OUTPATIENT)
Dept: RHEUMATOLOGY | Facility: CLINIC | Age: 77
End: 2022-10-24
Payer: MEDICARE

## 2022-10-24 VITALS
TEMPERATURE: 98 F | HEART RATE: 56 BPM | SYSTOLIC BLOOD PRESSURE: 140 MMHG | BODY MASS INDEX: 22.82 KG/M2 | HEIGHT: 65 IN | DIASTOLIC BLOOD PRESSURE: 70 MMHG | WEIGHT: 137 LBS | RESPIRATION RATE: 16 BRPM | OXYGEN SATURATION: 95 %

## 2022-10-24 DIAGNOSIS — R76.8 SS-A ANTIBODY POSITIVE: ICD-10-CM

## 2022-10-24 DIAGNOSIS — R76.8 POSITIVE ANA (ANTINUCLEAR ANTIBODY): ICD-10-CM

## 2022-10-24 DIAGNOSIS — M10.9 GOUT OF RIGHT HAND, UNSPECIFIED CAUSE, UNSPECIFIED CHRONICITY: Primary | ICD-10-CM

## 2022-10-24 DIAGNOSIS — D80.1 HYPOGAMMAGLOBULINAEMIA, UNSPECIFIED (HCC): ICD-10-CM

## 2022-10-24 PROCEDURE — 99214 OFFICE O/P EST MOD 30 MIN: CPT | Performed by: INTERNAL MEDICINE

## 2022-10-24 RX ORDER — ALLOPURINOL 300 MG/1
150 TABLET ORAL DAILY
Qty: 45 TABLET | Refills: 3 | Status: SHIPPED | OUTPATIENT
Start: 2022-10-24

## 2022-10-24 RX ORDER — PREDNISONE 1 MG/1
TABLET ORAL
Qty: 32 TABLET | Refills: 0 | Status: SHIPPED | OUTPATIENT
Start: 2022-10-24 | End: 2022-11-03

## 2022-10-27 ENCOUNTER — TELEPHONE (OUTPATIENT)
Dept: FAMILY MEDICINE CLINIC | Facility: CLINIC | Age: 77
End: 2022-10-27

## 2022-10-27 NOTE — TELEPHONE ENCOUNTER
Pt believes she has a sinus infection. Pt declined a video visit. No in office appts avail. Please advise Pt.

## 2023-03-10 LAB
ABSOLUTE BASOPHILS: 19 CELLS/UL (ref 0–200)
ABSOLUTE BASOPHILS: 30 CELLS/UL (ref 0–200)
ABSOLUTE EOSINOPHILS: 111 CELLS/UL (ref 15–500)
ABSOLUTE EOSINOPHILS: 152 CELLS/UL (ref 15–500)
ABSOLUTE LYMPHOCYTES: 1043 CELLS/UL (ref 850–3900)
ABSOLUTE LYMPHOCYTES: 1132 CELLS/UL (ref 850–3900)
ABSOLUTE MONOCYTES: 342 CELLS/UL (ref 200–950)
ABSOLUTE MONOCYTES: 348 CELLS/UL (ref 200–950)
ABSOLUTE NEUTROPHILS: 2143 CELLS/UL (ref 1500–7800)
ABSOLUTE NEUTROPHILS: 2179 CELLS/UL (ref 1500–7800)
ALBUMIN/GLOBULIN RATIO: 0.7 (CALC) (ref 1–2.5)
ALBUMIN/GLOBULIN RATIO: 0.7 (CALC) (ref 1–2.5)
ALBUMIN: 3.5 G/DL (ref 3.6–5.1)
ALBUMIN: 3.6 G/DL (ref 3.6–5.1)
ALKALINE PHOSPHATASE: 114 U/L (ref 37–153)
ALKALINE PHOSPHATASE: 116 U/L (ref 37–153)
ALT: 16 U/L (ref 6–29)
ALT: 16 U/L (ref 6–29)
APPEARANCE: CLEAR
AST: 21 U/L (ref 10–35)
AST: 23 U/L (ref 10–35)
BASOPHILS: 0.5 %
BASOPHILS: 0.8 %
BILIRUBIN, TOTAL: 0.6 MG/DL (ref 0.2–1.2)
BILIRUBIN, TOTAL: 0.6 MG/DL (ref 0.2–1.2)
BILIRUBIN: NEGATIVE
BUN: 17 MG/DL (ref 7–25)
BUN: 17 MG/DL (ref 7–25)
C-REACTIVE PROTEIN: 1.7 MG/L
CALCIUM: 9.4 MG/DL (ref 8.6–10.4)
CALCIUM: 9.5 MG/DL (ref 8.6–10.4)
CARBON DIOXIDE: 31 MMOL/L (ref 20–32)
CARBON DIOXIDE: 32 MMOL/L (ref 20–32)
CHLORIDE: 101 MMOL/L (ref 98–110)
CHLORIDE: 99 MMOL/L (ref 98–110)
CHOL/HDLC RATIO: 2.8 (CALC)
CHOLESTEROL, TOTAL: 139 MG/DL
COLOR: YELLOW
COMPLEMENT COMPONENT C3C: 116 MG/DL (ref 83–193)
COMPLEMENT COMPONENT C4C: 23 MG/DL (ref 15–57)
CREATININE, RANDOM URINE: 121 MG/DL (ref 20–275)
CREATININE: 0.66 MG/DL (ref 0.6–1)
CREATININE: 0.67 MG/DL (ref 0.6–1)
EGFR: 90 ML/MIN/1.73M2
EGFR: 90 ML/MIN/1.73M2
EOSINOPHILS: 3 %
EOSINOPHILS: 4 %
GLOBULIN: 5.1 G/DL (CALC) (ref 1.9–3.7)
GLOBULIN: 5.2 G/DL (CALC) (ref 1.9–3.7)
GLUCOSE: 86 MG/DL (ref 65–99)
GLUCOSE: 88 MG/DL (ref 65–99)
GLUCOSE: NEGATIVE
HDL CHOLESTEROL: 49 MG/DL
HEMATOCRIT: 40.8 % (ref 35–45)
HEMATOCRIT: 41.6 % (ref 35–45)
HEMOGLOBIN A1C: 5.7 % OF TOTAL HGB
HEMOGLOBIN: 13.1 G/DL (ref 11.7–15.5)
HEMOGLOBIN: 13.3 G/DL (ref 11.7–15.5)
IMMUNOGLOBULIN A: 337 MG/DL (ref 70–320)
IMMUNOGLOBULIN G: 3679 MG/DL (ref 600–1540)
IMMUNOGLOBULIN M: 58 MG/DL (ref 50–300)
KETONES: NEGATIVE
LDL-CHOLESTEROL: 73 MG/DL (CALC)
LEUKOCYTE ESTERASE: NEGATIVE
LYMPHOCYTES: 28.2 %
LYMPHOCYTES: 29.8 %
MCH: 29 PG (ref 27–33)
MCH: 29.1 PG (ref 27–33)
MCHC: 32 G/DL (ref 32–36)
MCHC: 32.1 G/DL (ref 32–36)
MCV: 90.3 FL (ref 80–100)
MCV: 91 FL (ref 80–100)
MONOCYTES: 9 %
MONOCYTES: 9.4 %
MPV: 9.8 FL (ref 7.5–12.5)
MPV: 9.9 FL (ref 7.5–12.5)
NEUTROPHILS: 56.4 %
NEUTROPHILS: 58.9 %
NITRITE: NEGATIVE
NON-HDL CHOLESTEROL: 90 MG/DL (CALC)
OCCULT BLOOD: NEGATIVE
PH: 7.5 (ref 5–8)
PLATELET COUNT: 274 THOUSAND/UL (ref 140–400)
PLATELET COUNT: 274 THOUSAND/UL (ref 140–400)
POTASSIUM: 4.2 MMOL/L (ref 3.5–5.3)
POTASSIUM: 4.4 MMOL/L (ref 3.5–5.3)
PROTEIN, TOTAL, RANDOM UR: 19 MG/DL (ref 5–24)
PROTEIN, TOTAL: 8.7 G/DL (ref 6.1–8.1)
PROTEIN, TOTAL: 8.7 G/DL (ref 6.1–8.1)
PROTEIN: NEGATIVE
RDW: 13.1 % (ref 11–15)
RDW: 13.1 % (ref 11–15)
RED BLOOD CELL COUNT: 4.52 MILLION/UL (ref 3.8–5.1)
RED BLOOD CELL COUNT: 4.57 MILLION/UL (ref 3.8–5.1)
SED RATE BY MODIFIED$WESTERGREN: 72 MM/H
SODIUM: 135 MMOL/L (ref 135–146)
SODIUM: 137 MMOL/L (ref 135–146)
SPECIFIC GRAVITY: 1.02 (ref 1–1.03)
TRIGLYCERIDES: 86 MG/DL
TSH: 1.96 MIU/L (ref 0.4–4.5)
URIC ACID: 3.5 MG/DL (ref 2.5–7)
VITAMIN D, 25-OH, TOTAL: 49 NG/ML (ref 30–100)
WHITE BLOOD CELL COUNT: 3.7 THOUSAND/UL (ref 3.8–10.8)
WHITE BLOOD CELL COUNT: 3.8 THOUSAND/UL (ref 3.8–10.8)

## 2023-03-16 LAB
ABSOLUTE BASOPHILS: 19 CELLS/UL (ref 0–200)
ABSOLUTE EOSINOPHILS: 111 CELLS/UL (ref 15–500)
ABSOLUTE LYMPHOCYTES: 1043 CELLS/UL (ref 850–3900)
ABSOLUTE MONOCYTES: 348 CELLS/UL (ref 200–950)
ABSOLUTE NEUTROPHILS: 2179 CELLS/UL (ref 1500–7800)
ALBUMIN/GLOBULIN RATIO: 0.7 (CALC) (ref 1–2.5)
ALBUMIN: 3.6 G/DL (ref 3.6–5.1)
ALBUMIN: 3.6 G/DL (ref 3.8–4.8)
ALKALINE PHOSPHATASE: 114 U/L (ref 37–153)
ALPHA-1-GLOBULINS: 0.3 G/DL (ref 0.2–0.3)
ALPHA-2-GLOBULINS: 0.7 G/DL (ref 0.5–0.9)
ALT: 16 U/L (ref 6–29)
APPEARANCE: CLEAR
AST: 23 U/L (ref 10–35)
BASOPHILS: 0.5 %
BETA 1 GLOBULIN: 0.5 G/DL (ref 0.4–0.6)
BETA 2 GLOBULIN: 0.4 G/DL (ref 0.2–0.5)
BILIRUBIN, TOTAL: 0.6 MG/DL (ref 0.2–1.2)
BILIRUBIN: NEGATIVE
BUN: 17 MG/DL (ref 7–25)
C-REACTIVE PROTEIN: 1.7 MG/L
CALCIUM: 9.5 MG/DL (ref 8.6–10.4)
CARBON DIOXIDE: 31 MMOL/L (ref 20–32)
CHLORIDE: 99 MMOL/L (ref 98–110)
COLOR: YELLOW
COMPLEMENT COMPONENT C3C: 116 MG/DL (ref 83–193)
COMPLEMENT COMPONENT C4C: 23 MG/DL (ref 15–57)
CREATININE, RANDOM URINE: 121 MG/DL (ref 20–275)
CREATININE: 0.67 MG/DL (ref 0.6–1)
EGFR: 90 ML/MIN/1.73M2
EOSINOPHILS: 3 %
GAMMA GLOBULINS: 3.2 G/DL (ref 0.8–1.7)
GLOBULIN: 5.1 G/DL (CALC) (ref 1.9–3.7)
GLUCOSE: 86 MG/DL (ref 65–99)
GLUCOSE: NEGATIVE
HEMATOCRIT: 40.8 % (ref 35–45)
HEMOGLOBIN: 13.1 G/DL (ref 11.7–15.5)
IMMUNOGLOBULIN A: 337 MG/DL (ref 70–320)
IMMUNOGLOBULIN G: 3679 MG/DL (ref 600–1540)
IMMUNOGLOBULIN M: 58 MG/DL (ref 50–300)
KETONES: NEGATIVE
LEUKOCYTE ESTERASE: NEGATIVE
LYMPHOCYTES: 28.2 %
MCH: 29 PG (ref 27–33)
MCHC: 32.1 G/DL (ref 32–36)
MCV: 90.3 FL (ref 80–100)
MONOCYTES: 9.4 %
MPV: 9.8 FL (ref 7.5–12.5)
NEUTROPHILS: 58.9 %
NITRITE: NEGATIVE
OCCULT BLOOD: NEGATIVE
PH: 7.5 (ref 5–8)
PLATELET COUNT: 274 THOUSAND/UL (ref 140–400)
POTASSIUM: 4.2 MMOL/L (ref 3.5–5.3)
PROTEIN, TOTAL, RANDOM UR: 19 MG/DL (ref 5–24)
PROTEIN, TOTAL: 8.7 G/DL (ref 6.1–8.1)
PROTEIN, TOTAL: 8.7 G/DL (ref 6.1–8.1)
PROTEIN: NEGATIVE
RDW: 13.1 % (ref 11–15)
RED BLOOD CELL COUNT: 4.52 MILLION/UL (ref 3.8–5.1)
SED RATE BY MODIFIED$WESTERGREN: 72 MM/H
SODIUM: 135 MMOL/L (ref 135–146)
SPECIFIC GRAVITY: 1.02 (ref 1–1.03)
URIC ACID: 3.5 MG/DL (ref 2.5–7)
WHITE BLOOD CELL COUNT: 3.7 THOUSAND/UL (ref 3.8–10.8)

## 2023-03-22 PROBLEM — R73.01 IFG (IMPAIRED FASTING GLUCOSE): Status: ACTIVE | Noted: 2023-03-01

## 2023-03-26 ENCOUNTER — TELEPHONE (OUTPATIENT)
Dept: RHEUMATOLOGY | Facility: CLINIC | Age: 78
End: 2023-03-26

## 2023-03-26 NOTE — TELEPHONE ENCOUNTER
Please print out \"test authorization\" from lab section from 3/9/2023.  I will then sign ad we will fax back to [facility]

## 2023-04-02 DIAGNOSIS — E03.9 ACQUIRED HYPOTHYROIDISM: ICD-10-CM

## 2023-04-04 RX ORDER — LEVOTHYROXINE SODIUM 88 UG/1
TABLET ORAL
Qty: 90 TABLET | Refills: 3 | Status: SHIPPED | OUTPATIENT
Start: 2023-04-04

## 2023-04-10 ENCOUNTER — HOSPITAL ENCOUNTER (OUTPATIENT)
Dept: MAMMOGRAPHY | Facility: HOSPITAL | Age: 78
Discharge: HOME | End: 2023-04-10
Attending: INTERNAL MEDICINE
Payer: MEDICARE

## 2023-04-10 PROCEDURE — 77063 BREAST TOMOSYNTHESIS BI: CPT | Performed by: INTERNAL MEDICINE

## 2023-04-10 PROCEDURE — 77067 SCR MAMMO BI INCL CAD: CPT | Performed by: INTERNAL MEDICINE

## 2023-04-13 DIAGNOSIS — I10 BENIGN ESSENTIAL HTN: ICD-10-CM

## 2023-04-13 RX ORDER — METOPROLOL SUCCINATE 50 MG/1
TABLET, EXTENDED RELEASE ORAL
Qty: 90 TABLET | Refills: 1 | Status: SHIPPED | OUTPATIENT
Start: 2023-04-13

## 2023-04-19 DIAGNOSIS — E78.5 DYSLIPIDEMIA: ICD-10-CM

## 2023-04-19 RX ORDER — ATORVASTATIN CALCIUM 10 MG/1
TABLET, FILM COATED ORAL
Qty: 90 TABLET | Refills: 0 | Status: SHIPPED | OUTPATIENT
Start: 2023-04-19

## 2023-05-17 ENCOUNTER — OFFICE VISIT (OUTPATIENT)
Dept: FAMILY MEDICINE CLINIC | Facility: CLINIC | Age: 78
End: 2023-05-17
Payer: MEDICARE

## 2023-05-17 VITALS
OXYGEN SATURATION: 98 % | TEMPERATURE: 98 F | WEIGHT: 137 LBS | HEART RATE: 59 BPM | HEIGHT: 65 IN | RESPIRATION RATE: 16 BRPM | SYSTOLIC BLOOD PRESSURE: 132 MMHG | BODY MASS INDEX: 22.82 KG/M2 | DIASTOLIC BLOOD PRESSURE: 62 MMHG

## 2023-05-17 DIAGNOSIS — R73.01 IFG (IMPAIRED FASTING GLUCOSE): ICD-10-CM

## 2023-05-17 DIAGNOSIS — E89.0 HYPOTHYROIDISM, POSTRADIOIODINE THERAPY: ICD-10-CM

## 2023-05-17 DIAGNOSIS — Z00.00 ENCOUNTER FOR ANNUAL HEALTH EXAMINATION: Primary | ICD-10-CM

## 2023-05-17 DIAGNOSIS — I35.0 NONRHEUMATIC AORTIC VALVE STENOSIS: ICD-10-CM

## 2023-05-17 DIAGNOSIS — E78.5 DYSLIPIDEMIA: ICD-10-CM

## 2023-05-17 DIAGNOSIS — I10 ESSENTIAL HYPERTENSION: ICD-10-CM

## 2023-05-17 PROBLEM — C44.311 BASAL CELL CARCINOMA OF SKIN OF NOSE: Status: RESOLVED | Noted: 2017-05-30 | Resolved: 2023-05-17

## 2023-05-17 RX ORDER — ZOLPIDEM TARTRATE 10 MG/1
10 TABLET ORAL NIGHTLY
COMMUNITY
Start: 2023-05-04 | End: 2023-05-17

## 2023-05-17 RX ORDER — LOSARTAN POTASSIUM 25 MG/1
25 TABLET ORAL DAILY
Qty: 90 TABLET | Refills: 1 | Status: SHIPPED | OUTPATIENT
Start: 2023-05-17

## 2023-05-17 RX ORDER — ATORVASTATIN CALCIUM 10 MG/1
10 TABLET, FILM COATED ORAL NIGHTLY
Qty: 90 TABLET | Refills: 3 | Status: SHIPPED | OUTPATIENT
Start: 2023-05-17

## 2023-05-18 ENCOUNTER — OFFICE VISIT (OUTPATIENT)
Facility: LOCATION | Age: 78
End: 2023-05-18
Payer: MEDICARE

## 2023-05-18 DIAGNOSIS — H90.3 SENSORY HEARING LOSS, BILATERAL: Primary | ICD-10-CM

## 2023-05-18 DIAGNOSIS — T16.2XXA FB EAR, LEFT, INITIAL ENCOUNTER: ICD-10-CM

## 2023-05-18 PROCEDURE — 99204 OFFICE O/P NEW MOD 45 MIN: CPT | Performed by: OTOLARYNGOLOGY

## 2023-05-18 PROCEDURE — 69200 CLEAR OUTER EAR CANAL: CPT | Performed by: OTOLARYNGOLOGY

## 2023-06-02 ENCOUNTER — OFFICE VISIT (OUTPATIENT)
Dept: HEMATOLOGY/ONCOLOGY | Facility: HOSPITAL | Age: 78
End: 2023-06-02
Attending: INTERNAL MEDICINE
Payer: MEDICARE

## 2023-06-02 VITALS
SYSTOLIC BLOOD PRESSURE: 167 MMHG | TEMPERATURE: 97 F | HEART RATE: 50 BPM | WEIGHT: 136.5 LBS | OXYGEN SATURATION: 98 % | BODY MASS INDEX: 23 KG/M2 | RESPIRATION RATE: 16 BRPM | DIASTOLIC BLOOD PRESSURE: 60 MMHG

## 2023-06-02 DIAGNOSIS — Z85.3 HISTORY OF INFILTRATING DUCTAL CARCINOMA OF BREAST: ICD-10-CM

## 2023-06-02 DIAGNOSIS — Z12.31 VISIT FOR SCREENING MAMMOGRAM: Primary | ICD-10-CM

## 2023-06-02 PROCEDURE — 99213 OFFICE O/P EST LOW 20 MIN: CPT | Performed by: INTERNAL MEDICINE

## 2023-06-02 NOTE — PROGRESS NOTES
Outpatient Oncology Care Plan  Problem list:  knowledge deficit    Problems related to:    combined modality therapy    Interventions:  provided general teaching    Expected outcomes:  understands plan of care    Progress towards outcome:  making progress    Education Record    Learner:  Patient  Barriers / Limitations:  None  Method:  Brief focused  Outcome:  Shows understanding  Comments: annual follow up for breast ca. Pt states she is feeling well and has no issues or concerns at this time. Instructed pt to change into gown.

## 2023-10-04 DIAGNOSIS — I10 BENIGN ESSENTIAL HTN: ICD-10-CM

## 2023-10-05 RX ORDER — METOPROLOL SUCCINATE 50 MG/1
TABLET, EXTENDED RELEASE ORAL
Qty: 90 TABLET | Refills: 0 | Status: SHIPPED | OUTPATIENT
Start: 2023-10-05

## 2023-10-05 NOTE — TELEPHONE ENCOUNTER
Metoprolol Succinate ER Oral Tablet Extended Release 24 Hour 50 MG          Will file in chart as: METOPROLOL SUCCINATE ER 50 MG Oral Tablet 24 Hr    Sig: TAKE ONE TABLET BY MOUTH ONE TIME DAILY    Disp: 90 tablet    Refills: 0    Start: 10/4/2023    Class: Normal    Non-formulary For: Benign essential HTN    Last ordered: 5 months ago (4/13/2023) by Melinda Bell MD    Last refill: 7/7/2023    Rx #: [id number]    Hypertension Medications Protocol Zkaxtf30/04/2023 04:04 PM   Protocol Details CMP or BMP in past 12 months    Last serum creatinine< 2.0    Appointment in past 6 or next 3 months      To be filled at: [facility], [address] [place] [address]: 5/17/23 for wellness visit  RTC: 6 months  Last Relevant Labs: 3/9/23  Filled: 4/13/23 #90 with 1 refills    Future Appointments   Date Time Provider Med Singh   10/24/2023  2:30 PM Mary Herrera MD [facility] [place]   11/17/2023  2:40 PM Radha Alcazar MD [facility] [address] [facility] [address]     Refill request approved per protocol.

## 2023-10-24 ENCOUNTER — OFFICE VISIT (OUTPATIENT)
Dept: RHEUMATOLOGY | Facility: CLINIC | Age: 78
End: 2023-10-24

## 2023-10-24 VITALS
WEIGHT: 133 LBS | OXYGEN SATURATION: 96 % | BODY MASS INDEX: 22.16 KG/M2 | TEMPERATURE: 98 F | RESPIRATION RATE: 16 BRPM | DIASTOLIC BLOOD PRESSURE: 68 MMHG | SYSTOLIC BLOOD PRESSURE: 132 MMHG | HEART RATE: 63 BPM | HEIGHT: 65 IN

## 2023-10-24 DIAGNOSIS — M10.9 GOUT, UNSPECIFIED CAUSE, UNSPECIFIED CHRONICITY, UNSPECIFIED SITE: ICD-10-CM

## 2023-10-24 DIAGNOSIS — Z51.81 THERAPEUTIC DRUG MONITORING: Primary | ICD-10-CM

## 2023-10-24 DIAGNOSIS — R76.8 SS-A ANTIBODY POSITIVE: ICD-10-CM

## 2023-10-24 DIAGNOSIS — M10.9 GOUT OF RIGHT HAND, UNSPECIFIED CAUSE, UNSPECIFIED CHRONICITY: ICD-10-CM

## 2023-10-24 PROCEDURE — 99214 OFFICE O/P EST MOD 30 MIN: CPT | Performed by: INTERNAL MEDICINE

## 2023-10-24 RX ORDER — ALLOPURINOL 300 MG/1
150 TABLET ORAL DAILY
Qty: 45 TABLET | Refills: 3 | Status: SHIPPED | OUTPATIENT
Start: 2023-10-24

## 2023-11-13 RX ORDER — LOSARTAN POTASSIUM 25 MG/1
25 TABLET ORAL DAILY
Qty: 90 TABLET | Refills: 0 | Status: SHIPPED | OUTPATIENT
Start: 2023-11-13

## 2023-11-13 NOTE — TELEPHONE ENCOUNTER
Name from pharmacy: Losartan Potassium Oral Tablet 25 MG         Will file in chart as: LOSARTAN 25 MG Oral Tab    Sig: TAKE ONE TABLET BY MOUTH ONE TIME DAILY    Disp: 90 tablet    Refills: 0    Start: 11/11/2023    Class: Normal    Non-formulary    Last ordered: 6 months ago (5/17/2023) by Nidia Meckel, MD    Last refill: 8/16/2023    Rx #: [id number]    Hypertension Medications Protocol [id number]/11/2023 09:37 AM   Protocol Details CMP or BMP in past 12 months    Last serum creatinine< 2.0    Appointment in past 6 or next 3 months      To be filled at: [facility] [place], [place] [facility] [phone], [phone]

## 2023-11-17 ENCOUNTER — OFFICE VISIT (OUTPATIENT)
Dept: FAMILY MEDICINE CLINIC | Facility: CLINIC | Age: 78
End: 2023-11-17
Payer: MEDICARE

## 2023-11-17 ENCOUNTER — TELEPHONE (OUTPATIENT)
Dept: FAMILY MEDICINE CLINIC | Facility: CLINIC | Age: 78
End: 2023-11-17

## 2023-11-17 VITALS
DIASTOLIC BLOOD PRESSURE: 60 MMHG | OXYGEN SATURATION: 98 % | TEMPERATURE: 98 F | RESPIRATION RATE: 16 BRPM | WEIGHT: 135 LBS | SYSTOLIC BLOOD PRESSURE: 136 MMHG | HEIGHT: 65 IN | HEART RATE: 60 BPM | BODY MASS INDEX: 22.49 KG/M2

## 2023-11-17 DIAGNOSIS — E55.9 VITAMIN D INSUFFICIENCY: ICD-10-CM

## 2023-11-17 DIAGNOSIS — E78.2 MIXED HYPERLIPIDEMIA: ICD-10-CM

## 2023-11-17 DIAGNOSIS — R73.01 IFG (IMPAIRED FASTING GLUCOSE): ICD-10-CM

## 2023-11-17 DIAGNOSIS — M85.80 OSTEOPENIA, UNSPECIFIED LOCATION: Primary | ICD-10-CM

## 2023-11-17 DIAGNOSIS — E89.0 HYPOTHYROIDISM, POSTRADIOIODINE THERAPY: ICD-10-CM

## 2023-11-17 DIAGNOSIS — D80.1 HYPOGAMMAGLOBULINEMIA (HCC): ICD-10-CM

## 2023-11-17 DIAGNOSIS — I10 BENIGN ESSENTIAL HTN: ICD-10-CM

## 2023-11-17 PROCEDURE — 99213 OFFICE O/P EST LOW 20 MIN: CPT | Performed by: FAMILY MEDICINE

## 2023-11-17 RX ORDER — LOSARTAN POTASSIUM 25 MG/1
25 TABLET ORAL DAILY
Qty: 90 TABLET | Refills: 1 | Status: SHIPPED | OUTPATIENT
Start: 2023-11-17

## 2023-11-17 RX ORDER — METOPROLOL SUCCINATE 50 MG/1
50 TABLET, EXTENDED RELEASE ORAL DAILY
Qty: 90 TABLET | Refills: 1 | Status: SHIPPED | OUTPATIENT
Start: 2023-11-17

## 2023-11-17 NOTE — TELEPHONE ENCOUNTER
Patient would like to start Prolia for her osteopenia. Not sure if there is some kind of prior authorization or other approval process to get the medication here in the office. Patient states she is going to be out of the country from [place] 12 through January 9. But if we could at least inform her of the process and get her scheduled for when it is appropriate would be perfect. She would prefer to do the Prolia injection when she comes back from vacation.

## 2023-12-12 ENCOUNTER — HOSPITAL ENCOUNTER (OUTPATIENT)
Age: 78
Discharge: HOME | End: 2023-12-12
Payer: MEDICARE

## 2023-12-12 VITALS
TEMPERATURE: 98 F | OXYGEN SATURATION: 97 % | RESPIRATION RATE: 20 BRPM | HEART RATE: 80 BPM | DIASTOLIC BLOOD PRESSURE: 68 MMHG | SYSTOLIC BLOOD PRESSURE: 195 MMHG

## 2023-12-12 DIAGNOSIS — T22.211A BURN OF RIGHT FOREARM, SECOND DEGREE, INITIAL ENCOUNTER: Primary | ICD-10-CM

## 2023-12-12 PROCEDURE — 99213 OFFICE O/P EST LOW 20 MIN: CPT | Performed by: PHYSICIAN ASSISTANT

## 2023-12-13 NOTE — ED INITIAL ASSESSMENT (HPI)
Pt was boiling water , and some of it splashed on her rt lower forearm causing a second degree burn, reddened with blister , size of a small mandrin

## 2023-12-13 NOTE — DISCHARGE INSTRUCTIONS
Silvadene twice daily for the first 48 hours then you can use Aquaphor keep the area clean and dry if any signs of infection be reevaluated

## 2024-01-01 ENCOUNTER — MED REC SCAN ONLY (OUTPATIENT)
Dept: FAMILY MEDICINE CLINIC | Facility: CLINIC | Age: 79
End: 2024-01-01

## 2024-02-13 LAB
ABSOLUTE BASOPHILS: 29 CELLS/UL (ref 0–200)
ABSOLUTE EOSINOPHILS: 172 CELLS/UL (ref 15–500)
ABSOLUTE LYMPHOCYTES: 1214 CELLS/UL (ref 850–3900)
ABSOLUTE MONOCYTES: 403 CELLS/UL (ref 200–950)
ABSOLUTE NEUTROPHILS: 2381 CELLS/UL (ref 1500–7800)
ALBUMIN/GLOBULIN RATIO: 0.8 (CALC) (ref 1–2.5)
ALBUMIN/GLOBULIN RATIO: 0.8 (CALC) (ref 1–2.5)
ALBUMIN: 3.5 G/DL (ref 3.8–4.8)
ALBUMIN: 3.6 G/DL (ref 3.6–5.1)
ALBUMIN: 3.6 G/DL (ref 3.6–5.1)
ALKALINE PHOSPHATASE: 126 U/L (ref 37–153)
ALKALINE PHOSPHATASE: 128 U/L (ref 37–153)
ALPHA-1-GLOBULINS: 0.2 G/DL (ref 0.2–0.3)
ALPHA-2-GLOBULINS: 0.7 G/DL (ref 0.5–0.9)
ALT: 21 U/L (ref 6–29)
ALT: 21 U/L (ref 6–29)
AST: 25 U/L (ref 10–35)
AST: 26 U/L (ref 10–35)
BASOPHILS: 0.7 %
BETA 1 GLOBULIN: 0.5 G/DL (ref 0.4–0.6)
BETA 2 GLOBULIN: 0.4 G/DL (ref 0.2–0.5)
BILIRUBIN, TOTAL: 0.4 MG/DL (ref 0.2–1.2)
BILIRUBIN, TOTAL: 0.4 MG/DL (ref 0.2–1.2)
BILIRUBIN: NEGATIVE
BUN: 16 MG/DL (ref 7–25)
BUN: 16 MG/DL (ref 7–25)
C-REACTIVE PROTEIN: 1.3 MG/L
CALCIUM: 9.3 MG/DL (ref 8.6–10.4)
CALCIUM: 9.3 MG/DL (ref 8.6–10.4)
CARBON DIOXIDE: 30 MMOL/L (ref 20–32)
CARBON DIOXIDE: 33 MMOL/L (ref 20–32)
CHLORIDE: 101 MMOL/L (ref 98–110)
CHLORIDE: 101 MMOL/L (ref 98–110)
CHOL/HDLC RATIO: 3 (CALC)
CHOLESTEROL, TOTAL: 110 MG/DL
COLOR: YELLOW
COMPLEMENT COMPONENT C3C: 107 MG/DL (ref 83–193)
COMPLEMENT COMPONENT C4C: 24 MG/DL (ref 15–57)
CREATININE, RANDOM URINE: 127 MG/DL (ref 20–275)
CREATININE: 0.65 MG/DL (ref 0.6–1)
CREATININE: 0.67 MG/DL (ref 0.6–1)
EGFR: 89 ML/MIN/1.73M2
EGFR: 90 ML/MIN/1.73M2
EOSINOPHILS: 4.1 %
GAMMA GLOBULINS: 3 G/DL (ref 0.8–1.7)
GLOBULIN: 4.7 G/DL (CALC) (ref 1.9–3.7)
GLOBULIN: 4.8 G/DL (CALC) (ref 1.9–3.7)
GLUCOSE: 95 MG/DL (ref 65–139)
GLUCOSE: 96 MG/DL (ref 65–139)
GLUCOSE: NEGATIVE
HDL CHOLESTEROL: 37 MG/DL
HEMATOCRIT: 41.1 % (ref 35–45)
HEMOGLOBIN A1C: 5.8 % OF TOTAL HGB
HEMOGLOBIN: 13.1 G/DL (ref 11.7–15.5)
KETONES: NEGATIVE
LDL-CHOLESTEROL: 55 MG/DL (CALC)
LEUKOCYTE ESTERASE: NEGATIVE
LYMPHOCYTES: 28.9 %
MAGNESIUM: 2 MG/DL (ref 1.5–2.5)
MCH: 29 PG (ref 27–33)
MCHC: 31.9 G/DL (ref 32–36)
MCV: 90.9 FL (ref 80–100)
MONOCYTES: 9.6 %
MPV: 9.9 FL (ref 7.5–12.5)
NEUTROPHILS: 56.7 %
NITRITE: NEGATIVE
NON-HDL CHOLESTEROL: 73 MG/DL (CALC)
OCCULT BLOOD: NEGATIVE
PH: 6.5 (ref 5–8)
PHOSPHATE (AS PHOSPHORUS): 4.2 MG/DL (ref 2.1–4.3)
PLATELET COUNT: 307 THOUSAND/UL (ref 140–400)
POTASSIUM: 4.1 MMOL/L (ref 3.5–5.3)
POTASSIUM: 4.2 MMOL/L (ref 3.5–5.3)
PROTEIN, TOTAL, RANDOM UR: 26 MG/DL (ref 5–24)
PROTEIN, TOTAL: 8.3 G/DL (ref 6.1–8.1)
PROTEIN, TOTAL: 8.3 G/DL (ref 6.1–8.1)
PROTEIN, TOTAL: 8.4 G/DL (ref 6.1–8.1)
PROTEIN: NEGATIVE
RDW: 13.2 % (ref 11–15)
RED BLOOD CELL COUNT: 4.52 MILLION/UL (ref 3.8–5.1)
SED RATE BY MODIFIED$WESTERGREN: 53 MM/H
SODIUM: 136 MMOL/L (ref 135–146)
SODIUM: 136 MMOL/L (ref 135–146)
SPECIFIC GRAVITY: 1.02 (ref 1–1.03)
TRIGLYCERIDES: 98 MG/DL
TSH: 7.57 MIU/L (ref 0.4–4.5)
URIC ACID: 3.6 MG/DL (ref 2.5–7)
VITAMIN D, 1,25 (OH)2,$TOTAL: 20 PG/ML (ref 18–72)
VITAMIN D2, 1,25 (OH)2: <8 PG/ML
VITAMIN D3, 1,25 (OH)2: 20 PG/ML
WHITE BLOOD CELL COUNT: 4.2 THOUSAND/UL (ref 3.8–10.8)

## 2024-04-11 ENCOUNTER — HOSPITAL ENCOUNTER (OUTPATIENT)
Dept: MAMMOGRAPHY | Facility: HOSPITAL | Age: 79
Discharge: HOME | End: 2024-04-11
Attending: INTERNAL MEDICINE
Payer: MEDICARE

## 2024-04-11 DIAGNOSIS — Z12.31 VISIT FOR SCREENING MAMMOGRAM: ICD-10-CM

## 2024-04-11 PROCEDURE — 77067 SCR MAMMO BI INCL CAD: CPT | Performed by: INTERNAL MEDICINE

## 2024-04-11 PROCEDURE — 77063 BREAST TOMOSYNTHESIS BI: CPT | Performed by: INTERNAL MEDICINE

## 2024-04-28 DIAGNOSIS — E03.9 ACQUIRED HYPOTHYROIDISM: ICD-10-CM

## 2024-04-30 RX ORDER — ZOLPIDEM TARTRATE 10 MG/1
10 TABLET ORAL DAILY
COMMUNITY
Start: 2024-04-10

## 2024-04-30 RX ORDER — LEVOTHYROXINE SODIUM 88 UG/1
88 TABLET ORAL
Qty: 90 TABLET | Refills: 0 | Status: SHIPPED | OUTPATIENT
Start: 2024-04-30

## 2024-06-17 NOTE — PROGRESS NOTES
[facility] Progress Note      Patient Name:  Romina Delgado  YOB: 1945  Medical Record Number:  [id number]    Encounter date: 6/18/2024    CHIEF COMPLAINT: Stage I R breast carcinoma s/p lumpectomy     HPI:      78 year old  female who I have followed since April 2011. S/p R  lumpectomy and was found to have a 1.3 cm focus of grade 3 invasive ductal carcinoma that was triple negative. LN neg. She received chemotherapy with AC followed by T from May 2011 through September 2011 per the SWOG-0221 clinical trial protocol. She completed right breast radiation in November 2011. She has continued with surveillance since then.  Presents for evaluation.  No new complaints.  No specific areas of bone pain.      SOCIAL HISTORY:    .  2 daughters.  7 grandchildren.  1 grand-son is radiology residency at [employer].  Lives with 1 daughter, son-in-law is ophthalmologist-moving to [place] but pt will stay local. Sister in [facility].     ROS:     Constitutional: no fever, chills fatigue,night sweats or weight loss  Neurologic: no headache, seizures, diplopia or weakness  Respiratory: no cough, SOB or hemoptysis  Cardiovascular: no chest pain, ankle swelling, ANDERSEN  GI: no nausea, vomiting, diarrhea or BRBPR  Musculoskeletal: no body-ache or joint pain  Dermatologic: no alopecia, rash, pruritis  : no hematuria, dysuria or frequency  Psych: no confusion or depression   Heme: no easy bruising or bleeding     ALLERGIES:    Allergies   Allergen Reactions    Hydrochlorothiazide OTHER (SEE COMMENTS)     Contraindicated with gout       MEDICATIONS:    Current Outpatient Medications   Medication Sig Dispense Refill    LEVOTHYROXINE 88 MCG Oral Tab take 1 tablet by mouth every morning before breakfast 90 tablet 0    zolpidem 10 MG Oral Tab Take 1 tablet (10 mg total) by mouth daily.      metoprolol succinate ER 50 MG Oral Tablet 24 Hr Take 1 tablet (50 mg total) by mouth daily. 90 tablet 1    losartan 25 MG Oral  Tab Take 1 tablet (25 mg total) by mouth daily. 90 tablet 1    allopurinol 300 MG Oral Tab Take 0.5 tablets (150 mg total) by mouth daily. 45 tablet 3    atorvastatin 10 MG Oral Tab Take 1 tablet (10 mg total) by mouth nightly. 90 tablet 3    Calcium Carb-Cholecalciferol (CALCIUM 500 + D3 OR) Take 2 tablets by mouth daily.      aspirin 81 MG Oral Tab EC Take 1 tablet (81 mg total) by mouth daily.      Melatonin 5 MG Oral Tab Take 1 tablet (5 mg total) by mouth nightly.      Multiple Minerals-Vitamins Oral Chew Tab Chew 1 tablet by mouth daily.         VITALS:  Height: --  Weight: 63.5 kg (140 lb) (1416)  BSA (Calculated - sq m): --  Pulse: 66 (1416)  BP: 145/74 (1416)  Temp: 98.4 °F (36.9 °C) (1416)  Do Not Use - Resp Rate: --  SpO2: 95 % (1416)    PS:  ECO    PHYSICAL EXAM:    General: alert and oriented x 3, not in acute distress.  HEENT: MEREDITH, oropharynx  clear.  Neck: supple.  No JVD /adenopathy.  CVS: S1S2, regular  Rhythm, no murmurs.   Lungs: Clear to auscultation and percussion.  Abdomen: Soft, non tender, no hepato-splenomegaly.  Extremities:  No edema.  CNS: no focal deficit  Breast: both breasts were soft, without any masses or nipple retraction. No axillary adenopathy was noted. Well-healed lumpectomy scar was noted on the right side without any underlying masses.    Emotional well being: Patient's emotional well being was assessed.  No issues requiring acute psychosocial intervention were identified.     LABS:     No results found for this or any previous visit (from the past 48 hour(s)).    ASSESSMENT AND PLAN:     # Stage I R breast carcinoma:  s/p lumpectomy -1.3 cm grade 3 triple negative breast carcinoma for which she received DD AC-T from - on SWOG- clinical trial protocol.  No evidence of recurrence since then.  Mammogram   okay, next due .  May continue to follow-up with me annually or may see PCP and follow-up with me as needed.       Orders Placed This Encounter        Procedures    [facility] ALIX 2D+3D SCREENING BILAT (CPT=77067/99687)       Return in about 1 year (around 6/18/2025) for MD visit.      Dora Jo M.D.    [facility]    [address] [place], [address]    6/18/2024

## 2024-06-18 ENCOUNTER — OFFICE VISIT (OUTPATIENT)
Dept: HEMATOLOGY/ONCOLOGY | Facility: HOSPITAL | Age: 79
End: 2024-06-18
Attending: INTERNAL MEDICINE

## 2024-06-18 VITALS
SYSTOLIC BLOOD PRESSURE: 145 MMHG | TEMPERATURE: 98 F | DIASTOLIC BLOOD PRESSURE: 74 MMHG | OXYGEN SATURATION: 95 % | RESPIRATION RATE: 16 BRPM | HEART RATE: 66 BPM | WEIGHT: 140 LBS | BODY MASS INDEX: 23 KG/M2

## 2024-06-18 DIAGNOSIS — Z85.3 HISTORY OF INFILTRATING DUCTAL CARCINOMA OF BREAST: ICD-10-CM

## 2024-06-18 DIAGNOSIS — Z12.31 VISIT FOR SCREENING MAMMOGRAM: Primary | ICD-10-CM

## 2024-06-18 PROCEDURE — 99213 OFFICE O/P EST LOW 20 MIN: CPT | Performed by: INTERNAL MEDICINE

## 2024-07-14 DIAGNOSIS — E78.5 DYSLIPIDEMIA: ICD-10-CM

## 2024-07-15 RX ORDER — ATORVASTATIN CALCIUM 10 MG/1
10 TABLET, FILM COATED ORAL NIGHTLY
Qty: 90 TABLET | Refills: 2 | Status: SHIPPED | OUTPATIENT
Start: 2024-07-15

## 2024-07-15 NOTE — TELEPHONE ENCOUNTER
Atorvastatin Calcium Oral Tablet 10 MG          Will file in chart as: ATORVASTATIN 10 MG Oral Tab    Sig: TAKE ONE TABLET BY MOUTH NIGHTLY    Disp: 90 tablet    Refills: 0    Start: 7/14/2024    Class: Normal    Non-formulary For: Dyslipidemia    Last ordered: 1 year ago (5/17/2023) by Maya Millan MD    Last refill: 4/18/2024    Rx #: [id number]    Cholesterol Medication Protocol [id number]/14/2024 09:34 AM   Protocol Details ALT < 80    ALT resulted within past year    Lipid panel within past 12 months    In person appointment or virtual visit in the past 12 mos or appointment in next 3 mos      To be filled at: [id number] - [facility] [address] [phone], [phone]     LOV: 11/17/23  Last Relevant Labs: 2/9/24  Filled: 5/17/23 #90 with 3 refills    Future Appointments   Date Time Provider Department Center   7/16/2024  2:00 PM Maya Millan MD [facility] [address] [facility] [address]   10/22/2024  2:15 PM Gordon Narvaez MD [id number] [place]     Refill request approved per protocol.

## 2024-07-16 ENCOUNTER — OFFICE VISIT (OUTPATIENT)
Dept: FAMILY MEDICINE CLINIC | Facility: CLINIC | Age: 79
End: 2024-07-16
Payer: MEDICARE

## 2024-07-16 VITALS
TEMPERATURE: 98 F | BODY MASS INDEX: 23.49 KG/M2 | DIASTOLIC BLOOD PRESSURE: 70 MMHG | SYSTOLIC BLOOD PRESSURE: 146 MMHG | RESPIRATION RATE: 16 BRPM | OXYGEN SATURATION: 98 % | WEIGHT: 141 LBS | HEIGHT: 65 IN | HEART RATE: 62 BPM

## 2024-07-16 DIAGNOSIS — Z00.00 ENCOUNTER FOR ANNUAL HEALTH EXAMINATION: Primary | ICD-10-CM

## 2024-07-16 DIAGNOSIS — E89.0 HYPOTHYROIDISM, POSTRADIOIODINE THERAPY: ICD-10-CM

## 2024-07-16 DIAGNOSIS — I10 BENIGN ESSENTIAL HTN: ICD-10-CM

## 2024-07-16 DIAGNOSIS — I35.0 NONRHEUMATIC AORTIC VALVE STENOSIS: ICD-10-CM

## 2024-07-16 DIAGNOSIS — Z78.0 ASYMPTOMATIC MENOPAUSAL STATE: ICD-10-CM

## 2024-07-16 DIAGNOSIS — M85.89 OSTEOPENIA OF MULTIPLE SITES: ICD-10-CM

## 2024-07-16 PROBLEM — L57.0 ACTINIC KERATOSIS: Status: RESOLVED | Noted: 2019-06-04 | Resolved: 2024-07-16

## 2024-07-16 PROCEDURE — 99214 OFFICE O/P EST MOD 30 MIN: CPT | Performed by: FAMILY MEDICINE

## 2024-07-16 PROCEDURE — G0439 PPPS, SUBSEQ VISIT: HCPCS | Performed by: FAMILY MEDICINE

## 2024-07-16 RX ORDER — LOSARTAN POTASSIUM 25 MG/1
25 TABLET ORAL DAILY
Qty: 90 TABLET | Refills: 1 | Status: CANCELLED | OUTPATIENT
Start: 2024-07-16

## 2024-07-16 RX ORDER — LEVOTHYROXINE SODIUM 0.1 MG/1
100 TABLET ORAL DAILY
Qty: 90 TABLET | Refills: 0 | Status: SHIPPED | OUTPATIENT
Start: 2024-07-16 | End: 2025-07-11

## 2024-07-16 RX ORDER — METOPROLOL SUCCINATE 50 MG/1
50 TABLET, EXTENDED RELEASE ORAL DAILY
Qty: 90 TABLET | Refills: 1 | Status: SHIPPED | OUTPATIENT
Start: 2024-07-16

## 2024-07-16 RX ORDER — LEVOTHYROXINE SODIUM 88 UG/1
88 TABLET ORAL
Qty: 90 TABLET | Refills: 1 | Status: CANCELLED | OUTPATIENT
Start: 2024-07-16

## 2024-07-16 RX ORDER — LOSARTAN POTASSIUM 50 MG/1
50 TABLET ORAL DAILY
Qty: 30 TABLET | Refills: 0 | Status: SHIPPED | OUTPATIENT
Start: 2024-07-16 | End: 2024-07-17

## 2024-07-16 RX ORDER — PREDNISONE 20 MG/1
80 TABLET ORAL DAILY
COMMUNITY
Start: 2024-07-15 | End: 2024-07-22

## 2024-07-16 NOTE — PROGRESS NOTES
Subjective:   Romina Delgado is a 78 year old female who presents for a Medicare Subsequent Annual Wellness visit (Pt already had Initial Annual Wellness) and scheduled follow up of multiple significant but stable problems.       Imms- up-to-date with Tdap, both pneumonia, shingrix.  Had shingles of the R ear after acoutic neuroma surgery 10/18. Discussed RSV & covid.        Cervical cancer screening-  No h/o abnormal paps, hpv, or genital warts. No further paps needed.        H/o breast cancer- Has a h/o R lumpectomy 2/2 IDC in 2011. No known family history of breast/ovarian cancer. Oncologist, Dr. Jo, does a breast exam yearly. Normal mammogram 4/11/24.        Colon cancer screening-  No family h/o colon cancer. C-scope done 5/17/18, Dr. Max, repeat 10 yrs. No further needed.         Osteoporosis screening- last dexa 9/26/22 showed osteopenia with FRAX of 15/4.2% worsened FRAX 2 yrs in a row. She has been on fosamax and finished 5/18. Decided in 2018 to cont Ca + Vit D, get wt bearing exercise and repeat dexa in 2 yrs.  As it is worse and back in the osteoporotic range sees rheumatology, Dr. Narvaez.        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 L ear doing ok. Has bone anchored chochlear stimulator on the R and working well. Got new hearing aids n 2023 which are connected to her phone, can set up the TV through it as well.        POA/LW- not on file, thinks she has it, but unsure  where it is .     Diet/exercise- eats well.      Dental/Eye Check up-  Recommended pt see dentist once every 6 months for a cleaning and once every year for an eye exam.        HTN- high since Dec. Taking losartan 25mg and metoprolol ER 50mg. Will increase the losartan up to 50mg.  No hydrochlorothiazide in the future diagnosed b/c has gout. Sees Dr. Narvaez, rheum.  Her cuff was similar to ours on 3/16/21          Aortic stenosis  Dyslidemia  -  sees Dr. John Martinez. Last echo done 11/30/22 showing moderate stenosis. Can walk up stairs and walks ~2mi/d with her dog without troubles.     Denies- sob, chest pain        IFG- Hba1c 5.8 on 2/9/24.         TSH- 7.57 on 2/9/24. Hasn't changed the levothyroxine dose yet. Is on 88mcg, will increase to 100mcg.       Bee sting- R arm on Sunday so 3 d ago. Had 10 stings the yr previously. This time was on the R hand and swelled         History/Other:   Fall Risk Assessment:   She has been screened for Falls and is low risk.      Cognitive Assessment:   She had a completely normal cognitive assessment - see flowsheet entries     Functional Ability/Status:   Romina Delgado has some abnormal functions as listed below:  She has Hearing problems based on screening of functional status.She has Vision problems based on screening of functional status.       Depression Screening (PHQ):  PHQ-2 SCORE: 0  , done 7/16/2024   Last [facility] Suicide Screening on 7/16/2024 was No Risk.          Advanced Directives:   She does have a Living Will but we do NOT have it on file in Epic.    She does have a POA but we do NOT have it on file in [employer].    Patient has Advance Care Planning documents but we do not have a copy in EMR. Discussed Advanced Care Planning with patient and instructed patient to get our office a copy to be scanned into EMR.      Patient Active Problem List   Diagnosis    Iron deficiency anemia, unspecified    Aortic stenosis    History of right breast cancer    Congenital nevus of left thigh    Sun-damaged skin    Personal history of skin cancer    Hypothyroidism, postradioiodine therapy    S/P excision of acoustic neuroma    Vitamin D insufficiency    Osteopenia of multiple sites    Deafness in right ear    Keratoacanthoma    Actinic keratosis    History of dysplastic nevus    Essential hypertension    Rosacea    Gout of right hand    Positive COLT (antinuclear antibody)    Erosive osteoarthritis    History of  actinic keratoses    SS-A antibody positive    IFG (impaired fasting glucose)    Mixed hyperlipidemia    Hypogammaglobulinemia (HCC)     Allergies:  She is allergic to hydrochlorothiazide.    Current Medications:  Outpatient Medications Marked as Taking for the 7/16/24 encounter (Office Visit) with Maya Millan MD   Medication Sig    predniSONE 20 MG Oral Tab Take 4 tablets (80 mg total) by mouth daily.    metoprolol succinate ER 50 MG Oral Tablet 24 Hr Take 1 tablet (50 mg total) by mouth daily.    losartan 50 MG Oral Tab Take 1 tablet (50 mg total) by mouth daily.    levothyroxine 100 MCG Oral Tab Take 1 tablet (100 mcg total) by mouth daily.    atorvastatin 10 MG Oral Tab TAKE ONE TABLET BY MOUTH NIGHTLY    LEVOTHYROXINE 88 MCG Oral Tab take 1 tablet by mouth every morning before breakfast    allopurinol 300 MG Oral Tab Take 0.5 tablets (150 mg total) by mouth daily.    Calcium Carb-Cholecalciferol (CALCIUM 500 + D3 OR) Take 2 tablets by mouth daily.    aspirin 81 MG Oral Tab EC Take 1 tablet (81 mg total) by mouth daily.    Melatonin 5 MG Oral Tab Take 1 tablet (5 mg total) by mouth nightly.    Multiple Minerals-Vitamins Oral Chew Tab Chew 1 tablet by mouth daily.       Medical History:  She  has a past medical history of Aortic stenosis, BCC (basal cell carcinoma of skin) (2017), Breast cancer (HCC), Closed undisplaced fracture of nasal bone (08/16/2021), Cochlear implant in place (02/2020), Dysplastic nevus (2019), Essential hypertension, Exposure to medical diagnostic radiation, Gout, History of postmenopausal HRT, History of right breast cancer (2011), History of shingles (10/2018), Hypogammaglobulinemia (HCC) (11/17/2023), Hypothyroidism, postradioiodine therapy, IFG (impaired fasting glucose) (03/2023), Keratoacanthoma type squamous cell carcinoma of skin (11/2018), Mixed hyperlipidemia, Osteoarthritis, Osteopenia, Personal history of antineoplastic chemotherapy, Personal history of colonic polyps  (), PONV (postoperative nausea and vomiting), S/P excision of acoustic neuroma (~), and Vitamin D insufficiency ().  Surgical History:  She  has a past surgical history that includes colonoscopy (2007, , 18); other surgical history; other surgical history (); tonsillectomy (); other surgical history (2013); radiation right (); chemotherapy (); needle biopsy right (Right, 2011); Brain Surgery (2008); cystoscopy,insert ureteral stent; skin tissue rearrangement (Left, 2018); colonoscopy (N/A, 2018); other (10/11/2018); middle ear surgery proc unlisted (); full graft proc nos,ear,lid <20sqcm (Right, 2020); cochlear implant ext speech (Right, 2020); lumpectomy right (2011); and kyler localization wire 1 site left (cpt=19281) ().   Family History:  Her family history includes Breast Cancer (age of onset: 65) in her self; Cancer in her father, paternal grandfather, and sister.  Social History:  She  reports that she quit smoking about 43 years ago. Her smoking use included cigarettes. She started smoking about 51 years ago. She has a 4 pack-year smoking history. She has never used smokeless tobacco. She reports current alcohol use. She reports that she does not use drugs.    Tobacco:  She smoked tobacco in the past but quit greater than 12 months ago.  Social History     Tobacco Use   Smoking Status Former    Current packs/day: 0.00    Average packs/day: 0.5 packs/day for 8.0 years (4.0 ttl pk-yrs)    Types: Cigarettes    Start date: 1972    Quit date: 1980    Years since quittin.8   Smokeless Tobacco Never   Tobacco Comments    quit 30 years ago        CAGE Alcohol Screen:   CAGE screening score of 0 on 2024, showing low risk of alcohol abuse.      Patient Care Team:  Maya Millan MD as PCP - General  Gordon Narvaez MD (RHEUMATOLOGY)    Review of Systems     Negative except Swelling of R hand and  forearm    Objective:   Physical Exam       /70 (BP Location: Left arm, Patient Position: Sitting, Cuff Size: adult)   Pulse 62   Temp 97.9 °F (36.6 °C) (Temporal)   Resp 16   Ht 5' 5\" (1.651 m)   Wt 141 lb (64 kg)   LMP 01/01/1994   SpO2 98%   BMI 23.46 kg/m²  Estimated body mass index is 23.46 kg/m² as calculated from the following:    Height as of this encounter: 5' 5\" (1.651 m).    Weight as of this encounter: 141 lb (64 kg).      GENERAL APPEARANCE:  Alert, no acute distress, appears stated age  HEENT:  Head- Normocephalic, atraumatic.    Eyes- Extraocular movements intact, pupils equally round and reactive to light    and accommodation, conjunctivae normal.    Ears- Tympanic membranes intact L, Has B hearing aids, R ear is sewn shut   Nose- Patent, normal septum and turbinates.    Mouth/Throat- Normal oral mucosa, throat non-erythematous.  NECK:  No submental, submandibular, ant/post cervical lymphadenopathy. No thyromegaly or masses.  PULMONARY:  Lungs clear to auscultation bilaterally. No wheezes, rales, or rhonchi.  CARDIOVASCULAR:  Regular rate and rhythm. No murmurs, gallops, or rubs.  ABDOMEN:  + bowel sounds, soft, nontender, nondistended. No hepatomegaly.  MUSCULOSKELETAL: Strength of upper and lower extremities 5/5 bilaterally. Normal gait.  NEUROLOGIC:  Cranial nerves 2-12 grossly intact.  PSYCHIATRIC:  Normal mood, affect, and hygiene.        Medicare Hearing Assessment:   Hearing Screening    Time taken: 7/16/2024  2:59 PM  Entry User: Maya Millan MD  Screening Method: Finger Rub  Finger Rub Result: Pass     Has B hearing aids, R ear is sewn shut    Visual Acuity:   Right Eye Visual Acuity: Corrected Right Eye Chart Acuity: 20/20   Left Eye Visual Acuity: Corrected Left Eye Chart Acuity: 20/15   Both Eyes Visual Acuity: Corrected Both Eyes Chart Acuity: 20/13   Able To Tolerate Visual Acuity: Yes        Assessment & Plan:   Romina Delgado is a 78 year old female who presents  for a Medicare Assessment.     1. Encounter for annual health examination (Primary)  2. Benign essential HTN  -     Metoprolol Succinate ER; Take 1 tablet (50 mg total) by mouth daily.  Dispense: 90 tablet; Refill: 1  -     Losartan Potassium; Take 1 tablet (50 mg total) by mouth daily.  Dispense: 30 tablet; Refill: 0  3. Nonrheumatic aortic valve stenosis  -     CARD ECHO 2D DOPPLER (CPT=93306); Future; Expected date: 07/16/2024  4. Osteopenia of multiple sites  -     XR DEXA BONE DENSITOMETRY (CPT=77080); Future; Expected date: 09/27/2024  5. Asymptomatic menopausal state  -     XR DEXA BONE DENSITOMETRY (CPT=77080); Future; Expected date: 09/27/2024  6. Hypothyroidism, postradioiodine therapy  -     Levothyroxine Sodium; Take 1 tablet (100 mcg total) by mouth daily.  Dispense: 90 tablet; Refill: 0  -     Assay, Thyroid Stim Hormone    The patient indicates understanding of these issues and agrees to the plan.  Reinforced healthy diet, lifestyle, and exercise.      Return in about 2 weeks (around 7/30/2024) for blood pressure check & in 3mo recheck thyroid lab.     Maya Millan MD, 7/16/2024     Supplementary Documentation:   General Health:  In the past six months, have you lost more than 10 pounds without trying?: 2 - No  Has your appetite been poor?: No  Type of Diet: Balanced  How does the patient maintain a good energy level?: Daily Walks;Stretching  How would you describe your daily physical activity?: Moderate  How would you describe your current health state?: Good  How do you maintain positive mental well-being?: Social Interaction;Visiting Family  On a scale of 0 to 10, with 0 being no pain and 10 being severe pain, what is your pain level?: 0 - (None)  In the past six months, have you experienced urine leakage?: 0-No  At any time do you feel concerned for the safety/well-being of yourself and/or your children, in your home or elsewhere?: No  Have you had any immunizations at another office such  as Influenza, Hepatitis B, Tetanus, or Pneumococcal?: No    Health Maintenance   Topic Date Due    COVID-19 Vaccine (4 - 2023-24 season) 09/01/2023    Annual Depression Screening  01/01/2024    Fall Risk Screening (Annual)  01/01/2024    Annual Physical  05/17/2024    Influenza Vaccine (1) 10/01/2024    DEXA Scan  Completed    Pneumococcal Vaccine: 65+ Years  Completed    Zoster Vaccines  Completed    Mammogram  Discontinued    Colonoscopy  Discontinued

## 2024-07-17 ENCOUNTER — TELEPHONE (OUTPATIENT)
Dept: FAMILY MEDICINE CLINIC | Facility: CLINIC | Age: 79
End: 2024-07-17

## 2024-07-17 DIAGNOSIS — I10 BENIGN ESSENTIAL HTN: ICD-10-CM

## 2024-07-17 RX ORDER — LOSARTAN POTASSIUM 50 MG/1
50 TABLET ORAL DAILY
Qty: 90 TABLET | Refills: 0 | Status: SHIPPED | OUTPATIENT
Start: 2024-07-17

## 2024-07-17 NOTE — TELEPHONE ENCOUNTER
Losartan increased to 50mg yesterday at appointment.    Requesting #90 per insurance    Ok to send?

## 2024-08-15 ENCOUNTER — OFFICE VISIT (OUTPATIENT)
Dept: FAMILY MEDICINE CLINIC | Facility: CLINIC | Age: 79
End: 2024-08-15
Payer: MEDICARE

## 2024-08-15 VITALS
SYSTOLIC BLOOD PRESSURE: 124 MMHG | HEIGHT: 65 IN | DIASTOLIC BLOOD PRESSURE: 64 MMHG | BODY MASS INDEX: 23.16 KG/M2 | OXYGEN SATURATION: 98 % | HEART RATE: 60 BPM | WEIGHT: 139 LBS | TEMPERATURE: 98 F | RESPIRATION RATE: 16 BRPM

## 2024-08-15 DIAGNOSIS — I10 ESSENTIAL HYPERTENSION: Primary | ICD-10-CM

## 2024-08-15 DIAGNOSIS — E89.0 HYPOTHYROIDISM, POSTRADIOIODINE THERAPY: ICD-10-CM

## 2024-08-15 DIAGNOSIS — E78.2 MIXED HYPERLIPIDEMIA: ICD-10-CM

## 2024-08-15 DIAGNOSIS — E55.9 VITAMIN D INSUFFICIENCY: ICD-10-CM

## 2024-08-15 DIAGNOSIS — R73.01 IFG (IMPAIRED FASTING GLUCOSE): ICD-10-CM

## 2024-08-15 PROCEDURE — 99213 OFFICE O/P EST LOW 20 MIN: CPT | Performed by: FAMILY MEDICINE

## 2024-08-15 RX ORDER — LOSARTAN POTASSIUM 50 MG/1
50 TABLET ORAL DAILY
Qty: 90 TABLET | Refills: 1 | Status: SHIPPED | OUTPATIENT
Start: 2024-08-15

## 2024-08-15 NOTE — PROGRESS NOTES
[facility] Visit Note  8/15/2024      Subjective:      Patient ID: Romina Delgado is a 78 year old female.    Chief Complaint:  Chief Complaint   Patient presents with    Blood Pressure     Losartan dose was increased from 25 mg to 50 mg/daily.       HPI:  Romina Delgado is a 78 year old female who is being seen today for the above.      HTN-   losartan dose was increased from 25 mg to 50 mg/daily.      Needing labs to f/u her hypothroidism, HL, pre-DM, etc in 6mo        Review of Systems - as stated above in the HPI      Objective:     Vitals:    08/15/24 1344   BP: 124/64   BP Location: Right arm   Patient Position: Sitting   Cuff Size: adult   Pulse: 60   Resp: 16   Temp: 97.9 °F (36.6 °C)   TempSrc: Temporal   SpO2: 98%   Weight: 139 lb (63 kg)   Height: 5' 5\" (1.651 m)       Physical Examination   General:  Alert, in no acute distress  HEENT: NCAT, EOMI, mucus membranes moist   Neck:  No cervical lymphadenopathy  CV: Regular rate and rhythm. No murmurs, gallops, or rubs.  Lungs:  Clear to auscultation B, no wheezes, rales, or rhonchi, normal respiratory effort  Abd:  +bowel sounds, soft  Ext:  No pedal edema,  Pedal pulses 2+ B        Assessment:     1. Essential hypertension  - losartan 50 MG Oral Tab; Take 1 tablet (50 mg total) by mouth daily.  Dispense: 90 tablet; Refill: 1  - CBC [6399] [Q]  - COMP METABOLIC PANEL [43113] [Q]    2. Mixed hyperlipidemia  - COMP METABOLIC PANEL [02245] [Q]  - LIPID PANEL [7600] [Q]    3. Hypothyroidism, postradioiodine therapy  - TSH [899] [Q]    4. Vitamin D insufficiency  - VITAMIN D, 1,25 DIHYDROXY [96223][Q]    5. IFG (impaired fasting glucose)  - HGB A1C [496] [Q]        Return in about 6 months (around 2/15/2025) for blood pressure check in Feb (fasting labs beforehand).

## 2024-10-04 ENCOUNTER — HOSPITAL ENCOUNTER (OUTPATIENT)
Dept: BONE DENSITY | Age: 79
Discharge: HOME | End: 2024-10-04
Attending: FAMILY MEDICINE
Payer: MEDICARE

## 2024-10-04 DIAGNOSIS — Z78.0 ASYMPTOMATIC MENOPAUSAL STATE: ICD-10-CM

## 2024-10-04 DIAGNOSIS — M85.89 OSTEOPENIA OF MULTIPLE SITES: ICD-10-CM

## 2024-10-04 PROCEDURE — 77080 DXA BONE DENSITY AXIAL: CPT | Performed by: FAMILY MEDICINE

## 2024-10-08 ENCOUNTER — OFFICE VISIT (OUTPATIENT)
Dept: RHEUMATOLOGY | Facility: CLINIC | Age: 79
End: 2024-10-08
Payer: MEDICARE

## 2024-10-08 VITALS
OXYGEN SATURATION: 95 % | TEMPERATURE: 97 F | RESPIRATION RATE: 16 BRPM | WEIGHT: 137.81 LBS | DIASTOLIC BLOOD PRESSURE: 70 MMHG | HEIGHT: 65 IN | BODY MASS INDEX: 22.96 KG/M2 | SYSTOLIC BLOOD PRESSURE: 148 MMHG | HEART RATE: 59 BPM

## 2024-10-08 DIAGNOSIS — M85.80 OSTEOPENIA, UNSPECIFIED LOCATION: ICD-10-CM

## 2024-10-08 DIAGNOSIS — Z00.00 WELLNESS EXAMINATION: ICD-10-CM

## 2024-10-08 DIAGNOSIS — R76.8 SS-A ANTIBODY POSITIVE: ICD-10-CM

## 2024-10-08 DIAGNOSIS — Z51.81 THERAPEUTIC DRUG MONITORING: ICD-10-CM

## 2024-10-08 DIAGNOSIS — M10.9 GOUT, UNSPECIFIED CAUSE, UNSPECIFIED CHRONICITY, UNSPECIFIED SITE: Primary | ICD-10-CM

## 2024-10-08 DIAGNOSIS — M10.9 GOUT OF RIGHT HAND, UNSPECIFIED CAUSE, UNSPECIFIED CHRONICITY: ICD-10-CM

## 2024-10-08 PROCEDURE — 99214 OFFICE O/P EST MOD 30 MIN: CPT | Performed by: INTERNAL MEDICINE

## 2024-10-08 PROCEDURE — G2211 COMPLEX E/M VISIT ADD ON: HCPCS | Performed by: INTERNAL MEDICINE

## 2024-10-08 RX ORDER — ALLOPURINOL 300 MG/1
300 TABLET ORAL DAILY
Qty: 90 TABLET | Refills: 3 | Status: SHIPPED | OUTPATIENT
Start: 2024-10-08

## 2024-10-08 NOTE — PROGRESS NOTES
Rheum f/u   =====================================================================================================  ?  Chief Complaint:   Gout, SSA antibody    Chief Complaint   Patient presents with    Follow - Up     LOV 10/24/2023  Rapid 3 score is a 0  Patient doing great on the allopurinol      PCP:  Maya Millan MD  Fax: [phone]  Phone: [phone]  =====================================================================================================  HPI    [place] MEKHI Delgado is a 78 year old female     5/2021 HPI  Fingers are more swollen for the past few years. This issues has been going on for a long time. Nodules are getting bigger lately. Went to occupational therapy which is helping with making a fist. No other joints pains besides in hands. Denies gout history.   Occasional rashes on legs. Thinks had some contact dermatitis from detergent.   +dry eye: worse at night. Son-in-law is an ophthalmologist. Has received dry eye testing in the past. Uses ATs.  +dry mouth: troublesome.   FHx: aunt and cousin with RA.   Breast cancer 10 years ago, no recurrences.   Knitting and sewing.   No known fractures.   No miscarriages/obstetric issues.   Denies current malar rash, photosensitivity rash, discoid lesions, oral/nasal ulcers, pleuritic chest pain, seizures/psychosis, Raynaud's, miscarriages, or blood clots.    ==============================================================================================================  Today's Visit: 10/08/24    Doing very well.  No gout flares.  PIPs and DIPs more swollen recently.  Not painful however.  CMCs hurt  -Minimal dry mouth, saw dentist regularly.  Teeth are great  -Dry eye: Uses artificial tears.  -High risk osteopenia: Discussed with PCP.  Patient continues on calcium for now    Denies any new areas of joint pain/swelling  Denies any skin rashes.   Denies any new lymphadenopathy  Denies any new peripheral neuropathy  Denies any photosensitivity  Denies  any significant fatigue or night sweats or unexpected weight loss.       Medications:  Outpatient Medications Marked as Taking for the 10/8/24 encounter (Office Visit) with Gordon Narvaez MD   Medication Sig Dispense Refill    allopurinol 300 MG Oral Tab Take 1 tablet (300 mg total) by mouth daily. 90 tablet 3    losartan 50 MG Oral Tab Take 1 tablet (50 mg total) by mouth daily. 90 tablet 1    metoprolol succinate ER 50 MG Oral Tablet 24 Hr Take 1 tablet (50 mg total) by mouth daily. 90 tablet 1    levothyroxine 100 MCG Oral Tab Take 1 tablet (100 mcg total) by mouth daily. 90 tablet 0    atorvastatin 10 MG Oral Tab TAKE ONE TABLET BY MOUTH NIGHTLY 90 tablet 2    zolpidem 10 MG Oral Tab Take 1 tablet (10 mg total) by mouth daily.      Calcium Carb-Cholecalciferol (CALCIUM 500 + D3 OR) Take 2 tablets by mouth daily.      aspirin 81 MG Oral Tab EC Take 1 tablet (81 mg total) by mouth daily.      Melatonin 5 MG Oral Tab Take 1 tablet (5 mg total) by mouth nightly.      Multiple Minerals-Vitamins Oral Chew Tab Chew 1 tablet by mouth daily.       Modified Medications    Modified Medication Previous Medication    ALLOPURINOL 300 MG ORAL TAB allopurinol 300 MG Oral Tab       Take 1 tablet (300 mg total) by mouth daily.    Take 0.5 tablets (150 mg total) by mouth daily.     Medications Discontinued During This Encounter   Medication Reason    allopurinol 300 MG Oral Tab      ?  Allergies:  Allergies   Allergen Reactions    Hydrochlorothiazide OTHER (SEE COMMENTS)     Contraindicated with gout       Objective    Vitals:    10/08/24 1411   BP: 148/70   Pulse: 59   Resp: 16   Temp: 97.3 °F (36.3 °C)   SpO2: 95%   Weight: 137 lb 12.8 oz (62.5 kg)   Height: 5' 5\" (1.651 m)     GEN: NAD, well-nourished.   HEENT: Head: NCAT. Face: No lesions. Eyes: Conjunctiva clear. Sclera are anicteric. PERRLA. EOMs are full.   CV: 3/6 systolic ejection murmur (chronic)  PULM:  CTAB, easy effort  Extremities: No cyanosis, edema or deformities.    Neurologic: Strength, CN2-12 grossly intact   Psych: normal affect.   Skin: No lesions or rashes.  MSK: 28 joint count performed. No evidence of synovitis in mcp, pip, dip, wrist, elbows, shoulders, hips, knees, ankles, mtp unless otherwise noted. Full ROM of elbows, wrists, knees.     -Slightly swollen and enlarged PIPs and DIPs without any tenderness  Hand OA changes, especially at the left PIP 4.  B/l CMC OA    RMCP2,3 hypertrophy    Labs:      Additional Labs:    2/2024  SPEP WNL  Uric acid 3.6  C3/C4 WNL  Sed rate 53  CRP 1.3 mg/L  UPCR 20 6/2027  U/a WNL  CBC W differential WNL  Creatinine 0.65, rest of CMP WNL    3/2023  SPEP negative, elevated IgG noted  IgG 3679  IgM: 58  IgA 337  Uric acid 3.5  C3/C4 WNL  Sed rate 72  CRP 1.7 NG/DL  UPCR: 19/121  U/A WNL  CBC W differential WNL  Creatinine 0.67, rest of CMP WNL  TSH WNL      Results for ALE BRAVO (MRN [id number]) as of 10/24/2022 14:20   Ref. Range 5/14/2021 13:14 3/24/2022 08:25 5/27/2022 14:35   URIC ACID Latest Ref Range: 2.5 - 7.0 mg/dL 6.0 4.0 3.4     3/2021 labs  COLT 1: 1280 nucleolar speckled  COLT 1: 80 nucleolar homogenous   SSA greater than 8.0.  Rest of TEODORA is -9    4/2013  CCP negative    11/2012    RF 19    Radiology:    X-ray hands: RMCP4 erosion. No other erosive changes. MCP2,3 hypertrophy and joint space narrowing. Central erosions in PIPs.     XR Hand (right) 3/2021  CONCLUSION:     1. Osteoarthritis involving all of the interphalangeal joints and the 1st carpometacarpal joint.   2. Single periarticular erosion evident radial base proximal phalange of the 4th digit is non-specific but could indicate concomitant erosive arthritis.  Correlation with clinical findings is necessary.     10/2024 DEXA:  ADDITIONAL FINDINGS:  The 10 year fracture risk for a major osteoporotic fracture is 14 %.  The 10 year fracture risk for a hip fracture is 3.9 %.    9/2020 DEXA  LUMBAR SPINE ANALYSIS RESULTS:       Bone mineral density (BMD)  (g/cm2):  1.106     Lumbar T-Score:  0.5       % young normals:  106       % age matched controls:  141       Change from prior spine examination:  2.7*%                TOTAL HIP ANALYSIS RESULTS:         Bone mineral density (BMD) (g/cm2):  0.753       Total Hip T-Score:  -1.5       % young normals:  80       % age matched controls:  104       Change from prior hip examination:  0.4%                FEMORAL NECK ANALYSIS RESULTS:         Bone mineral density (BMD) (g/cm2):  0.597       Femoral neck T-Score:  -2.3       % young normals:  70       % age matched controls:  96       Change from prior hip examination:  -1.0%                 CT hand 6/2021     Impression   CONCLUSION:     1. There is no uric acid detected in association with the 4th metacarpophalangeal joint which was the area of erosion described on the plain radiograph.  This could be an area of erosion related to a remote gouty arthropathy but is nonspecific.  There   are areas of uric acid deposition noted within the 2nd and 3rd metacarpophalangeal joints and also along the course of the abductor pollicis longus tendon.   2. There is extensive osteoarthritis noted involving all of the interphalangeal joints and to a lesser extent metacarpophalangeal joints and also the 1st carpometacarpal joint.            Radiology review:  XR DEXA BONE DENSITOMETRY (CPT=77080)    Result Date: 10/4/2024  CONCLUSION:  Bone mineral density values for the total left hip and the left femoral neck are compatible with the diagnosis of osteopenia by WHO definition (T score between -1.0 and -2.5).  If therapy is initiated, follow-up study is recommended in 2 years for further evaluation of therapeutic efficacy.   Dissimilar scan types or analysis methods precludes determination of the statistical significance of the interim change in the bone mineral density values compared to the exam from 9/26/2022. Recommendation:  The National Osteoporosis Foundation (NOF) recommends  pharmacological treatment for patients with a FRAX 10-year risk score of 3% or higher for a hip fracture or 20% or higher for a major osteoporotic fracture, to prevent osteoporosis and reduce fracture risk.  The World Health Organization has defined the following categories based on bone density: Normal bone:  T-score greater than or equal to -1 Osteopenia: T-score  less than -1 and greater  than -2.5 Osteoporosis:  T-score less than or equal -2.5   LOCATION:  [place]     Dictated by (CST): Jeffry Madrid MD on 10/04/2024 at 3:31 PM     Finalized by (CST): Jeffry Madrid MD on 10/04/2024 at 3:32 PM      =====================================================================================================  Assessment and Plan    Assessment:  1. Gout, unspecified cause, unspecified chronicity, unspecified site    2. SS-A antibody positive    3. Gout of right hand, unspecified cause, unspecified chronicity    4. Therapeutic drug monitoring    5. Wellness examination    6. Osteopenia, unspecified location         #Erosive Gouty arthropathy: DECT proven of the right hand   #Erosive hand OA: radiographic central erosions at the PIPs and DIPs    #positive COLT, SSA.  She notes a long history of dry eyes and dry mouth.  Symptoms are now bothersome.  Reportedly objective dry eye testing was normal.  -Minimal glandular symptoms noted.  Mild dry mouth.  No significant caries history  -Gout has been good for the most part however she notes worsening PIP and DIP synovial thickening indicating perhaps continued MSU deposition.    Plan:  -Increase allopurinol from 150 mg daily to 300 mg daily; baseline serum urate 6.0, goal <3.0 mg/dL   -Patient could not tolerate colchicine  in the past.    -Uloric was too expensive  -On losartan which is a uricosuric  -Prednisone taper for gout flares  -Repeat Sjogren's labs and uric acid in spring 2025  -Discussed 2022 DEXA indicated elevated hip FRAX of 4.2%.  Greater than 3%  indicates recommendation for pharmacologic therapy.  Declined antiresorptive in the past.  Patient will discuss with PCP.  -Hip FRAX still greater than 3% based on 2024 DEXA.  Patient prefers to follow-up with PCP regarding this matter  -RTC 1 year    Recommend flu shot.  Declines COVID booster.  Will consider RSV vaccine.    Diagnoses and all orders for this visit:    Gout, unspecified cause, unspecified chronicity, unspecified site  -     Comp Metabolic Panel (14); Future  -     CBC With Differential With Platelet; Future  -     Urinalysis with Culture Reflex; Future  -     Creatinine, Urine, Random; Future  -     Protein,Total,Urine, Random; Future  -     C-Reactive Protein; Future  -     Sed Rate, Westergren (Automated); Future  -     Complement C3, Serum; Future  -     Complement C4, Serum; Future  -     Uric Acid; Future  -     Protein Electrophoresis, with Total Protein and Reflex to DHAIANA, Serum [[id number]] [Q]; Future  -     allopurinol 300 MG Oral Tab; Take 1 tablet (300 mg total) by mouth daily.    SS-A antibody positive  -     Comp Metabolic Panel (14); Future  -     CBC With Differential With Platelet; Future  -     Urinalysis with Culture Reflex; Future  -     Creatinine, Urine, Random; Future  -     Protein,Total,Urine, Random; Future  -     C-Reactive Protein; Future  -     Sed Rate, Westergren (Automated); Future  -     Complement C3, Serum; Future  -     Complement C4, Serum; Future  -     Uric Acid; Future  -     Protein Electrophoresis, with Total Protein and Reflex to DAHIANA, Serum [[id number]] [Q]; Future  -     allopurinol 300 MG Oral Tab; Take 1 tablet (300 mg total) by mouth daily.    Gout of right hand, unspecified cause, unspecified chronicity  -     allopurinol 300 MG Oral Tab; Take 1 tablet (300 mg total) by mouth daily.    Therapeutic drug monitoring  -     allopurinol 300 MG Oral Tab; Take 1 tablet (300 mg total) by mouth daily.    Wellness examination    Osteopenia, unspecified location          No  follow-ups on file.      The above plan of care, diagnosis, orders, and follow-up were discussed with the patient. Questions related to this recommended plan of care were answered.    Thank you for referring this delightful patient to me. Please feel free to contact me with any questions.     This report was performed utilizing speech recognition software technology. Despite proofreading, speech recognition errors could escape detection. If a word or phrase is confusing or out of context, please do not hesitate to call for   clarification.       Kind regards      Gordon Narvaez MD  [facility]

## 2024-10-09 DIAGNOSIS — E89.0 HYPOTHYROIDISM, POSTRADIOIODINE THERAPY: ICD-10-CM

## 2024-10-10 RX ORDER — LEVOTHYROXINE SODIUM 100 UG/1
100 TABLET ORAL DAILY
Qty: 90 TABLET | Refills: 0 | Status: SHIPPED | OUTPATIENT
Start: 2024-10-10

## 2024-10-10 NOTE — TELEPHONE ENCOUNTER
Requesting Levothyroxine 100mcg  LOV: 8/15/24  RTC: 6 months  Last Relevant Labs: 2/9/24  Filled: 7/16/24 #90 with 0 refills    Future Appointments   Date Time Provider Department Center   2/25/2025  2:40 PM Maya Millan MD [facility] [address]   4/15/2025  2:20 PM [facility]   10/29/2025  1:15 PM Gordon Narvaez MD [facility] [place]     Thyroid Medication Protocol Uhfmsq80/09/2024 09:52 PM   Protocol Details Last TSH value is normal    TSH in past 12 months    In person appointment or virtual visit in the past 12 mos or appointment in next 3 mos     Rx sent to pharmacy per protocol

## 2025-01-02 DIAGNOSIS — I10 BENIGN ESSENTIAL HTN: ICD-10-CM

## 2025-01-02 RX ORDER — METOPROLOL SUCCINATE 50 MG/1
50 TABLET, EXTENDED RELEASE ORAL DAILY
Qty: 90 TABLET | Refills: 0 | Status: SHIPPED | OUTPATIENT
Start: 2025-01-02

## 2025-01-02 NOTE — TELEPHONE ENCOUNTER
Requesting Metoprolol 50mg  LOV: 8/15/24  RTC: 6 months  Last Relevant Labs: 2/9/24  Filled: 7/16/24 #90 with 1 refills    Future Appointments   Date Time Provider Department Center   2/25/2025  2:40 PM Maya Millan MD [facility] [address]   4/15/2025  2:20 PM [facility]   10/29/2025  1:15 PM Gordon Narvaez MD [facility] [place]     Hypertension Medications Protocol Kywchx4801/02/2025 01:21 PM   Protocol Details   Last BP reading less than 140/90    CMP or BMP in past 12 months    In person appointment or virtual visit in the past 12 mos or appointment in next 3 mos    EGFRCR or GFRNAA > 50     Rx sent to pharmacy per protocol

## 2025-01-05 DIAGNOSIS — E89.0 HYPOTHYROIDISM, POSTRADIOIODINE THERAPY: ICD-10-CM

## 2025-01-06 RX ORDER — LEVOTHYROXINE SODIUM 100 UG/1
100 TABLET ORAL DAILY
Qty: 90 TABLET | Refills: 0 | Status: SHIPPED | OUTPATIENT
Start: 2025-01-06

## 2025-01-06 NOTE — TELEPHONE ENCOUNTER
Requesting Levothyroxine 100mcg  LOV: 8/15/24  RTC: 6 months  Last Relevant Labs: 2/9/24  Filled: 10/10/24 #90 with 0 refills    Future Appointments   Date Time Provider Department Center   2/25/2025  2:40 PM Maya Millan MD [facility] [address]   4/15/2025  2:20 PM [facility]   10/29/2025  1:15 PM Gordon Narvaez MD [facility] [place]     Thyroid Medication Protocol Fgqvoi5901/06/2025 02:53 PM   Protocol Details   Last TSH value is normal    TSH in past 12 months    In person appointment or virtual visit in the past 12 mos or appointment in next 3 mos     Rx sent to pharmacy per protocol

## 2025-02-03 LAB
ABSOLUTE BASOPHILS: 20 CELLS/UL (ref 0–200)
ABSOLUTE EOSINOPHILS: 201 CELLS/UL (ref 15–500)
ABSOLUTE LYMPHOCYTES: 1017 CELLS/UL (ref 850–3900)
ABSOLUTE MONOCYTES: 350 CELLS/UL (ref 200–950)
ABSOLUTE NEUTROPHILS: 1812 CELLS/UL (ref 1500–7800)
ALBUMIN/GLOBULIN RATIO: 0.8 (CALC) (ref 1–2.5)
ALBUMIN: 3.5 G/DL (ref 3.8–4.8)
ALBUMIN: 3.6 G/DL (ref 3.6–5.1)
ALKALINE PHOSPHATASE: 134 U/L (ref 37–153)
ALPHA-1-GLOBULINS: 0.3 G/DL (ref 0.2–0.3)
ALPHA-2-GLOBULINS: 0.7 G/DL (ref 0.5–0.9)
ALT: 18 U/L (ref 6–29)
APPEARANCE: CLEAR
AST: 23 U/L (ref 10–35)
BASOPHILS: 0.6 %
BETA 1 GLOBULIN: 0.5 G/DL (ref 0.4–0.6)
BETA 2 GLOBULIN: 0.4 G/DL (ref 0.2–0.5)
BILIRUBIN, TOTAL: 0.3 MG/DL (ref 0.2–1.2)
BILIRUBIN: NEGATIVE
BUN: 15 MG/DL (ref 7–25)
C-REACTIVE PROTEIN: <3 MG/L
CALCIUM: 9.4 MG/DL (ref 8.6–10.4)
CARBON DIOXIDE: 26 MMOL/L (ref 20–32)
CHLORIDE: 103 MMOL/L (ref 98–110)
COLOR: YELLOW
COMPLEMENT COMPONENT C3C: 120 MG/DL (ref 83–193)
COMPLEMENT COMPONENT C4C: 18 MG/DL (ref 15–57)
CREATININE, RANDOM URINE: 67 MG/DL (ref 20–275)
CREATININE: 0.66 MG/DL (ref 0.6–1)
EGFR: 89 ML/MIN/1.73M2
EOSINOPHILS: 5.9 %
GAMMA GLOBULINS: 3 G/DL (ref 0.8–1.7)
GLOBULIN: 4.7 G/DL (CALC) (ref 1.9–3.7)
GLUCOSE: 97 MG/DL (ref 65–99)
GLUCOSE: NEGATIVE
HEMATOCRIT: 41.2 % (ref 35–45)
HEMOGLOBIN: 12.5 G/DL (ref 11.7–15.5)
KETONES: NEGATIVE
LEUKOCYTE ESTERASE: NEGATIVE
LYMPHOCYTES: 29.9 %
MCH: 28.2 PG (ref 27–33)
MCHC: 30.3 G/DL (ref 32–36)
MCV: 93 FL (ref 80–100)
MONOCYTES: 10.3 %
MPV: 10.7 FL (ref 7.5–12.5)
NEUTROPHILS: 53.3 %
NITRITE: NEGATIVE
OCCULT BLOOD: NEGATIVE
PH: 7.5 (ref 5–8)
PLATELET COUNT: 263 THOUSAND/UL (ref 140–400)
POTASSIUM: 4.2 MMOL/L (ref 3.5–5.3)
PROTEIN, TOTAL, RANDOM UR: 16 MG/DL (ref 5–24)
PROTEIN, TOTAL: 8.3 G/DL (ref 6.1–8.1)
PROTEIN, TOTAL: 8.3 G/DL (ref 6.1–8.1)
PROTEIN: NEGATIVE
RDW: 13.7 % (ref 11–15)
RED BLOOD CELL COUNT: 4.43 MILLION/UL (ref 3.8–5.1)
SED RATE BY MODIFIED$WESTERGREN: 46 MM/H
SODIUM: 137 MMOL/L (ref 135–146)
SPECIFIC GRAVITY: 1.01 (ref 1–1.03)
URIC ACID: 2.5 MG/DL (ref 2.5–7)
WHITE BLOOD CELL COUNT: 3.4 THOUSAND/UL (ref 3.8–10.8)

## 2025-02-06 LAB
ABSOLUTE BASOPHILS: 20 CELLS/UL (ref 0–200)
ABSOLUTE EOSINOPHILS: 158 CELLS/UL (ref 15–500)
ABSOLUTE LYMPHOCYTES: 1036 CELLS/UL (ref 850–3900)
ABSOLUTE MONOCYTES: 327 CELLS/UL (ref 200–950)
ABSOLUTE NEUTROPHILS: 1759 CELLS/UL (ref 1500–7800)
ALBUMIN/GLOBULIN RATIO: 0.8 (CALC) (ref 1–2.5)
ALBUMIN: 3.6 G/DL (ref 3.6–5.1)
ALKALINE PHOSPHATASE: 141 U/L (ref 37–153)
ALT: 18 U/L (ref 6–29)
AST: 22 U/L (ref 10–35)
BASOPHILS: 0.6 %
BILIRUBIN, TOTAL: 0.4 MG/DL (ref 0.2–1.2)
BUN: 15 MG/DL (ref 7–25)
CALCIUM: 9.5 MG/DL (ref 8.6–10.4)
CARBON DIOXIDE: 30 MMOL/L (ref 20–32)
CHLORIDE: 101 MMOL/L (ref 98–110)
CHOL/HDLC RATIO: 2.6 (CALC)
CHOLESTEROL, TOTAL: 116 MG/DL
CREATININE: 0.7 MG/DL (ref 0.6–1)
EGFR: 88 ML/MIN/1.73M2
EOSINOPHILS: 4.8 %
GLOBULIN: 4.7 G/DL (CALC) (ref 1.9–3.7)
GLUCOSE: 95 MG/DL (ref 65–99)
HDL CHOLESTEROL: 44 MG/DL
HEMATOCRIT: 40.4 % (ref 35–45)
HEMOGLOBIN A1C: 6.1 % OF TOTAL HGB
HEMOGLOBIN: 12.9 G/DL (ref 11.7–15.5)
LDL-CHOLESTEROL: 57 MG/DL (CALC)
LYMPHOCYTES: 31.4 %
MCH: 29.2 PG (ref 27–33)
MCHC: 31.9 G/DL (ref 32–36)
MCV: 91.4 FL (ref 80–100)
MONOCYTES: 9.9 %
MPV: 10.3 FL (ref 7.5–12.5)
NEUTROPHILS: 53.3 %
NON-HDL CHOLESTEROL: 72 MG/DL (CALC)
PLATELET COUNT: 259 THOUSAND/UL (ref 140–400)
POTASSIUM: 4.7 MMOL/L (ref 3.5–5.3)
PROTEIN, TOTAL: 8.3 G/DL (ref 6.1–8.1)
RDW: 13.8 % (ref 11–15)
RED BLOOD CELL COUNT: 4.42 MILLION/UL (ref 3.8–5.1)
SODIUM: 136 MMOL/L (ref 135–146)
TRIGLYCERIDES: 73 MG/DL
TSH: 2.06 MIU/L (ref 0.4–4.5)
VITAMIN D, 1,25 (OH)2,$TOTAL: 16 PG/ML (ref 18–72)
VITAMIN D2, 1,25 (OH)2: <8 PG/ML
VITAMIN D3, 1,25 (OH)2: 16 PG/ML
WHITE BLOOD CELL COUNT: 3.3 THOUSAND/UL (ref 3.8–10.8)

## 2025-02-27 ENCOUNTER — OFFICE VISIT (OUTPATIENT)
Dept: FAMILY MEDICINE CLINIC | Facility: CLINIC | Age: 80
End: 2025-02-27
Payer: MEDICARE

## 2025-02-27 VITALS
HEIGHT: 65 IN | WEIGHT: 135 LBS | RESPIRATION RATE: 16 BRPM | OXYGEN SATURATION: 95 % | BODY MASS INDEX: 22.49 KG/M2 | SYSTOLIC BLOOD PRESSURE: 156 MMHG | TEMPERATURE: 98 F | DIASTOLIC BLOOD PRESSURE: 70 MMHG | HEART RATE: 61 BPM

## 2025-02-27 DIAGNOSIS — I10 ESSENTIAL HYPERTENSION: Primary | ICD-10-CM

## 2025-02-27 DIAGNOSIS — R73.03 PRE-DIABETES: ICD-10-CM

## 2025-02-27 DIAGNOSIS — Z29.89 NEED FOR MALARIA PROPHYLAXIS: ICD-10-CM

## 2025-02-27 DIAGNOSIS — Z71.84 TRAVEL ADVICE ENCOUNTER: ICD-10-CM

## 2025-02-27 PROCEDURE — 99214 OFFICE O/P EST MOD 30 MIN: CPT | Performed by: FAMILY MEDICINE

## 2025-02-27 PROCEDURE — G2211 COMPLEX E/M VISIT ADD ON: HCPCS | Performed by: FAMILY MEDICINE

## 2025-02-27 RX ORDER — CIPROFLOXACIN 500 MG/1
500 TABLET, FILM COATED ORAL DAILY
Qty: 3 TABLET | Refills: 0 | Status: SHIPPED | OUTPATIENT
Start: 2025-02-27 | End: 2025-03-02

## 2025-02-27 RX ORDER — ATOVAQUONE AND PROGUANIL HYDROCHLORIDE 250; 100 MG/1; MG/1
1 TABLET, FILM COATED ORAL DAILY
Qty: 26 TABLET | Refills: 0 | Status: SHIPPED | OUTPATIENT
Start: 2025-02-27

## 2025-02-27 RX ORDER — LOSARTAN POTASSIUM 100 MG/1
100 TABLET ORAL DAILY
Qty: 30 TABLET | Refills: 0 | Status: SHIPPED | OUTPATIENT
Start: 2025-02-27

## 2025-02-27 RX ORDER — MUPIROCIN 20 MG/G
OINTMENT TOPICAL
COMMUNITY
Start: 2025-02-26

## 2025-02-27 RX ORDER — DOXYCYCLINE HYCLATE 100 MG
100 TABLET ORAL 2 TIMES DAILY
COMMUNITY
Start: 2025-02-26 | End: 2025-03-05

## 2025-02-27 NOTE — PROGRESS NOTES
[facility] Visit Note  2/27/2025      Subjective:      Patient ID: Romina Delgado is a 79 year old female.    Chief Complaint:  Chief Complaint   Patient presents with    Blood Pressure     Routine f/u, brought list of home readings for review.    Lab Results     Done 1/31/25.    Medication Request     States she will be traveling to [place] in May and is requesting printed scripts for anti-diarrhea, malaria & antibiotic to have on hand.       HPI:  Romina Delgado is a 79 year old female who is being seen today for the above.      HTN-  range 120-161/60-89, avg 150/70's.  Currently taking:  - Losartan 50 mg daily (incr to 100mg 2/27/25)  - Metoprolol succinate 50 mg daily (pulse 59-66)      Pre-diabetes- trending upward each yr. A1c 6.1 on 1/31/25. Tries to walk 2mi/d most of the time but less 1/2 mi if cold or rainy. Was eating more pasta lately.      Traveling to [place]  May 30 [address]  -requesting an antidiarrheal, anti-malaria, and an antibiotic for any potential infection.          Review of Systems - as stated above in the HPI      Objective:     Vitals:    02/27/25 1352 02/27/25 1412   BP: 160/74 156/70   BP Location: Right arm Right arm   Patient Position: Sitting Sitting   Cuff Size: adult adult   Pulse: 61    Resp: 16    Temp: 98 °F (36.7 °C)    TempSrc: Temporal    SpO2: 95%    Weight: 135 lb (61.2 kg)    Height: 5' 5\" (1.651 m)        Physical Examination   General:  Alert, in no acute distress  HEENT: NCAT, EOMI, normal TMs, oropharynx, and nasal turbinates.  Neck:  No cervical lymphadenopathy  CV: Regular rate and rhythm. No murmurs, gallops, or rubs.  Lungs:  Clear to auscultation B, no wheezes, rales, or rhonchi, normal respiratory effort  Abd:  +bowel sounds, soft  Ext:  No pedal edema,  Pedal pulses 2+ B        Assessment:     1. Essential hypertension  - losartan 100 MG Oral Tab; Take 1 tablet (100 mg total) by mouth daily.  Dispense: 30 tablet; Refill: 0    2. Pre-diabetes  -discussed  diet/exercise.    3. Travel advice encounter  - ciprofloxacin (CIPRO) 500 MG Oral Tab; Take 1 tablet (500 mg total) by mouth daily for 3 days. For traveller's diarrhea  Dispense: 3 tablet; Refill: 0  - Atovaquone-Proguanil HCl 250-100 MG Oral Tab; Take 1 tablet by mouth daily. Start 2 days prior to trip and continue 7 days after returning for malaria prophylaxis  Dispense: 26 tablet; Refill: 0    4. Need for malaria prophylaxis  - ciprofloxacin (CIPRO) 500 MG Oral Tab; Take 1 tablet (500 mg total) by mouth daily for 3 days. For traveller's diarrhea  Dispense: 3 tablet; Refill: 0  - Atovaquone-Proguanil HCl 250-100 MG Oral Tab; Take 1 tablet by mouth daily. Start 2 days prior to trip and continue 7 days after returning for malaria prophylaxis  Dispense: 26 tablet; Refill: 0        I am providing care as part of an ongoing, longitudinal care relationship.    No follow-ups on file.

## 2025-03-11 ENCOUNTER — OFFICE VISIT (OUTPATIENT)
Dept: FAMILY MEDICINE CLINIC | Facility: CLINIC | Age: 80
End: 2025-03-11
Payer: MEDICARE

## 2025-03-11 VITALS
HEIGHT: 65 IN | DIASTOLIC BLOOD PRESSURE: 64 MMHG | OXYGEN SATURATION: 96 % | BODY MASS INDEX: 22.49 KG/M2 | HEART RATE: 64 BPM | SYSTOLIC BLOOD PRESSURE: 124 MMHG | WEIGHT: 135 LBS | TEMPERATURE: 98 F | RESPIRATION RATE: 16 BRPM

## 2025-03-11 DIAGNOSIS — I10 ESSENTIAL HYPERTENSION: Primary | ICD-10-CM

## 2025-03-11 PROCEDURE — 99213 OFFICE O/P EST LOW 20 MIN: CPT | Performed by: FAMILY MEDICINE

## 2025-03-11 PROCEDURE — G2211 COMPLEX E/M VISIT ADD ON: HCPCS | Performed by: FAMILY MEDICINE

## 2025-03-11 RX ORDER — LOSARTAN POTASSIUM 50 MG/1
50 TABLET ORAL DAILY
Qty: 90 TABLET | Refills: 1 | Status: SHIPPED | OUTPATIENT
Start: 2025-03-11

## 2025-03-11 NOTE — PROGRESS NOTES
[facility] Visit Note  3/11/2025      Subjective:      Patient ID: Romina Delgado is a 79 year old female.    Chief Complaint:  Chief Complaint   Patient presents with    Blood Pressure     States her SBP at home has been 114, 118 & 120 so she did not increase the Losartan dose, has continued with the 50 mg daily.        HPI:  Romina Delgado is a 79 year old female who is being seen today for the above.      HTN-  did not increase her losartan from 50mg up to 100mg and today is fine. Had a log at home and forgot it. Log was under 120/x the entire week.    History 2/27/25: range 120-161/60-89, avg 150/70's.  Currently taking:  - Losartan 50 mg daily (incr to 100mg 2/27/25)-didn't increase 3/11/25  - Metoprolol succinate 50 mg daily (pulse 59-66)         Review of Systems - as stated above in the HPI      Objective:     Vitals:    03/11/25 1430   BP: 124/64   Pulse: 64   Resp: 16   Temp: 98 °F (36.7 °C)   TempSrc: Temporal   SpO2: 96%   Weight: 135 lb (61.2 kg)   Height: 5' 5\" (1.651 m)       Physical Examination   General:  Alert, in no acute distress  HEENT: NCAT, EOMI, mucus membranes moist   Neck:  No cervical lymphadenopathy  CV: Regular rate and rhythm. No murmurs, gallops, or rubs.  Lungs:  Clear to auscultation B, no wheezes, rales, or rhonchi, normal respiratory effort  Abd:  +bowel sounds, soft  Ext:  No pedal edema,  Pedal pulses 2+ B        Assessment:     1. Essential hypertension  - losartan 50 MG Oral Tab; Take 1 tablet (50 mg total) by mouth daily.  Dispense: 90 tablet; Refill: 1  -to keep log of BP and if starting to run high to let me know.      I am providing care as part of an ongoing, longitudinal care relationship.    Return for annual wellness visit in July.

## 2025-04-04 DIAGNOSIS — E78.5 DYSLIPIDEMIA: ICD-10-CM

## 2025-04-04 DIAGNOSIS — I10 BENIGN ESSENTIAL HTN: ICD-10-CM

## 2025-04-04 RX ORDER — METOPROLOL SUCCINATE 50 MG/1
50 TABLET, EXTENDED RELEASE ORAL DAILY
Qty: 90 TABLET | Refills: 1 | Status: SHIPPED | OUTPATIENT
Start: 2025-04-04

## 2025-04-04 RX ORDER — ATORVASTATIN CALCIUM 10 MG/1
10 TABLET, FILM COATED ORAL NIGHTLY
Qty: 90 TABLET | Refills: 3 | Status: SHIPPED | OUTPATIENT
Start: 2025-04-04

## 2025-04-04 NOTE — TELEPHONE ENCOUNTER
: Metoprolol Succinate ER Oral Tablet Extended Release 24 Hour 50 MG          Will file in chart as: METOPROLOL SUCCINATE ER 50 MG Oral Tablet 24 Hr    Sig: TAKE ONE TABLET BY MOUTH ONE TIME DAILY    Disp: 90 tablet    Refills: 0    Start: 4/4/2025    Class: Normal    Non-formulary For: Benign essential HTN    Last ordered: 3 months ago (1/2/2025) by Maya Millan MD    Last refill: 1/2/2025    Rx #: [id number]    Hypertension Medications Protocol Hibtlw9504/04/2025 07:44 AM   Protocol Details CMP or BMP in past 12 months    Last BP reading less than 140/90    In person appointment or virtual visit in the past 12 mos or appointment in next 3 mos    EGFRCR or GFRNAA > 50    Medication is active on med list       Name from pharmacy: Atorvastatin Calcium Oral Tablet 10 MG         Will file in chart as: ATORVASTATIN 10 MG Oral Tab    Sig: TAKE ONE TABLET BY MOUTH NIGHTLY    Disp: 90 tablet    Refills: 0    Start: 4/4/2025    Class: Normal    Non-formulary For: Dyslipidemia    Last ordered: 8 months ago (7/15/2024) by Maya Millan MD    Last refill: 1/2/2025    Rx #: [id number]    Cholesterol Medication Protocol Jrizfm3004/04/2025 07:44 AM   Protocol Details ALT < 80    ALT resulted within past year    Lipid panel within past 12 months    In person appointment or virtual visit in the past 12 mos or appointment in next 3 mos    Medication is active on med list      To be filled at: [facility] # [address] [place], [place] [address] [address] [phone], [phone]     LOV: 3/11/25  RTC: July for annual wellness  Last Relevant Labs: 1/31/25    Future Appointments   Date Time Provider Department Center   4/15/2025  2:20 PM [facility]   7/24/2025  1:20 PM Maya Millan MD [facility] [address]   10/29/2025  1:15 PM Gordon Narvaez MD [facility] [place]     Refill request approved per protocol.

## 2025-04-07 DIAGNOSIS — E89.0 HYPOTHYROIDISM, POSTRADIOIODINE THERAPY: ICD-10-CM

## 2025-04-09 RX ORDER — LEVOTHYROXINE SODIUM 100 UG/1
100 TABLET ORAL DAILY
Qty: 90 TABLET | Refills: 2 | Status: SHIPPED | OUTPATIENT
Start: 2025-04-09

## 2025-04-09 NOTE — TELEPHONE ENCOUNTER
: Levothyroxine Sodium Oral Tablet 100 MCG          Will file in chart as: LEVOTHYROXINE 100 MCG Oral Tab    Sig: TAKE ONE TABLET BY MOUTH ONE TIME DAILY    Disp: 90 tablet    Refills: 0    Start: 4/7/2025    Class: Normal    Non-formulary For: Hypothyroidism, postradioiodine therapy    Last ordered: 3 months ago (1/6/2025) by Maya Millan MD    Last refill: 1/6/2025    Rx #: [id number]    Thyroid Medication Protocol Ykuqma7004/07/2025 10:30 PM   Protocol Details TSH in past 12 months    Last TSH value is normal    In person appointment or virtual visit in the past 12 mos or appointment in next 3 mos    Medication is active on med list      To be filled at: [facility] # [address] [place] [address] [address] [phone], [phone]     Lab Results   Component Value Date    T4F 1.3 04/25/2019    TSH 2.06 01/31/2025    TSHT4 4.07 03/23/2021       LOV: 3/11/25  RTC: July for annual wellness visit  Filled: 1/6/25 #90 with 0 refills    Future Appointments   Date Time Provider Department Center   4/15/2025  2:20 PM [facility]   7/24/2025  1:20 PM Maya Millan MD [facility] [address]   10/29/2025  1:15 PM Gordon Narvaez MD [facility] [place]     Refill request approved per protocol.

## 2025-04-15 ENCOUNTER — HOSPITAL ENCOUNTER (OUTPATIENT)
Dept: MAMMOGRAPHY | Facility: HOSPITAL | Age: 80
Discharge: HOME | End: 2025-04-15
Attending: INTERNAL MEDICINE
Payer: MEDICARE

## 2025-04-15 DIAGNOSIS — Z12.31 VISIT FOR SCREENING MAMMOGRAM: ICD-10-CM

## 2025-04-15 PROCEDURE — 77063 BREAST TOMOSYNTHESIS BI: CPT | Performed by: INTERNAL MEDICINE

## 2025-04-15 PROCEDURE — 77067 SCR MAMMO BI INCL CAD: CPT | Performed by: INTERNAL MEDICINE

## 2025-06-25 NOTE — PROGRESS NOTES
Cancer Center Progress Note      Patient Name:  Romina Delgado  YOB: 1945  Medical Record Number:  [id number]    Encounter date: 6/27/2025    CHIEF COMPLAINT: Stage I R breast carcinoma s/p lumpectomy     HPI:      79 year old  female who I have followed since 4/11. S/p R  lumpectomy-1.3 cm gr 3 IDC, triple neg. LN neg. S/p ddAC-T from 5/11-9/11  per  [id number]-[id number] clinical trial.  Completed RT 9/11.  On surveillance.    No new complaints.  No specific areas of bone pain.      SOCIAL HISTORY:    .  2 daughters-local.  7 grandchildren.  1 grand-son in radiology residency at [employer].  Lives with 1 daughter, son-in-law is ophthalmologist-moving to [place] but pt will stay local. Sister in [facility].     ROS:     Constitutional: no fever, chills fatigue,night sweats or weight loss  Neurologic: no headache, seizures, diplopia or weakness  Respiratory: no cough, SOB or hemoptysis  Cardiovascular: no chest pain, ankle swelling, ANDERSEN  GI: no nausea, vomiting, diarrhea or BRBPR  Musculoskeletal: no body-ache or joint pain  Dermatologic: no alopecia, rash, pruritis  : no hematuria, dysuria or frequency  Psych: no confusion or depression   Heme: no easy bruising or bleeding     ALLERGIES:    Allergies   Allergen Reactions    Hydrochlorothiazide OTHER (SEE COMMENTS)     Contraindicated with gout       MEDICATIONS:    Current Outpatient Medications   Medication Sig Dispense Refill    levothyroxine 100 MCG Oral Tab TAKE ONE TABLET BY MOUTH ONE TIME DAILY 90 tablet 2    metoprolol succinate ER 50 MG Oral Tablet 24 Hr TAKE ONE TABLET BY MOUTH ONE TIME DAILY 90 tablet 1    atorvastatin 10 MG Oral Tab TAKE ONE TABLET BY MOUTH NIGHTLY 90 tablet 3    losartan 50 MG Oral Tab Take 1 tablet (50 mg total) by mouth daily. 90 tablet 1    mupirocin 2 % External Ointment       allopurinol 300 MG Oral Tab Take 1 tablet (300 mg total) by mouth daily. 90 tablet 3    zolpidem 10 MG Oral Tab Take 1 tablet (10 mg total) by mouth  in the morning.      Calcium Carb-Cholecalciferol (CALCIUM 500 + D3 OR) Take 2 tablets by mouth in the morning.      aspirin 81 MG Oral Tab EC Take 1 tablet (81 mg total) by mouth in the morning.      Melatonin 5 MG Oral Tab Take 1 tablet (5 mg total) by mouth nightly.      Multiple Minerals-Vitamins Oral Chew Tab Chew 1 tablet by mouth in the morning.         VITALS:  Height: 165.1 cm (5' 5\") (1330)  Weight: 60.3 kg (133 lb) (1330)  BSA (Calculated - sq m): 1.66 sq meters (1330)  Pulse: 63 (1330)  BP: 119/61 (1330)  Temp: 97.3 °F (36.3 °C) (1330)  Do Not Use - Resp Rate: --  SpO2: 95 % (1330)    PS:  ECO    PHYSICAL EXAM:    General: alert and oriented x 3, not in acute distress.  HEENT: MEREDITH, oropharynx  clear.  Neck: supple.  No JVD /adenopathy.  CVS: S1S2, regular  Rhythm, no murmurs.   Lungs: Clear to auscultation and percussion.  Abdomen: Soft, non tender, no hepato-splenomegaly.  Extremities:  No edema.  CNS: no focal deficit  Breast: both breasts were soft, without any masses or nipple retraction. No axillary adenopathy was noted. Well-healed lumpectomy scar was noted on the right side without any underlying masses.    Emotional well being: Patient's emotional well being was assessed.  No issues requiring acute psychosocial intervention were identified.     LABS:     No results found for this or any previous visit (from the past 48 hours).    ASSESSMENT AND PLAN:     # Stage I R breast carcinoma:  s/p lumpectomy -1.3 cm gr 3 triple neg IDC. S/p   DD AC-T from - on SWOG-.  SIMON since then.    Bilateral mammogram  okay, next due .    May continue to follow-up with me annually or may see PCP and follow-up with me as needed.      # Quality measures: None indicated  Survivorship issues were addressed with the patient.  No issues were identified by the patient.     Orders Placed This Encounter        Procedures    [facility] ALIX 2D+3D SCREENING BILAT  (CPT=77067/25436)       Return in about 1 year (around 6/27/2026) for MD visit.    Dora Jo M.D.    [facility]  [address] [place] [address] [place], [address]      6/27/2025

## 2025-06-27 ENCOUNTER — OFFICE VISIT (OUTPATIENT)
Age: 80
End: 2025-06-27
Attending: INTERNAL MEDICINE
Payer: MEDICARE

## 2025-06-27 VITALS
DIASTOLIC BLOOD PRESSURE: 61 MMHG | RESPIRATION RATE: 16 BRPM | OXYGEN SATURATION: 95 % | HEIGHT: 65 IN | SYSTOLIC BLOOD PRESSURE: 119 MMHG | BODY MASS INDEX: 22.16 KG/M2 | HEART RATE: 63 BPM | TEMPERATURE: 97 F | WEIGHT: 133 LBS

## 2025-06-27 DIAGNOSIS — Z85.3 HISTORY OF INFILTRATING DUCTAL CARCINOMA OF BREAST: Primary | ICD-10-CM

## 2025-06-27 DIAGNOSIS — Z12.31 VISIT FOR SCREENING MAMMOGRAM: ICD-10-CM

## 2025-06-27 NOTE — PROGRESS NOTES
Education Record    Learner:  Patient    Disease / Diagnosis: right breast cancer   Annual FU     Barriers / Limitations:  None   Comments:    Method:  Discussion   Comments:    General Topics:  Plan of care reviewed   Comments:    Outcome:  Shows understanding   Comments:   Breast imaging up to date.   Pt feeling well.   No new medical problems

## 2025-07-24 ENCOUNTER — OFFICE VISIT (OUTPATIENT)
Dept: FAMILY MEDICINE CLINIC | Facility: CLINIC | Age: 80
End: 2025-07-24
Payer: MEDICARE

## 2025-07-24 VITALS
HEART RATE: 64 BPM | OXYGEN SATURATION: 98 % | DIASTOLIC BLOOD PRESSURE: 62 MMHG | TEMPERATURE: 98 F | SYSTOLIC BLOOD PRESSURE: 122 MMHG | WEIGHT: 133 LBS | RESPIRATION RATE: 16 BRPM | BODY MASS INDEX: 22.16 KG/M2 | HEIGHT: 65 IN

## 2025-07-24 DIAGNOSIS — M85.89 OSTEOPENIA OF MULTIPLE SITES: ICD-10-CM

## 2025-07-24 DIAGNOSIS — R73.01 IFG (IMPAIRED FASTING GLUCOSE): ICD-10-CM

## 2025-07-24 DIAGNOSIS — Z85.3 HISTORY OF RIGHT BREAST CANCER: ICD-10-CM

## 2025-07-24 DIAGNOSIS — I35.0 NONRHEUMATIC AORTIC VALVE STENOSIS: ICD-10-CM

## 2025-07-24 DIAGNOSIS — I10 ESSENTIAL HYPERTENSION: ICD-10-CM

## 2025-07-24 DIAGNOSIS — Z00.00 ENCOUNTER FOR ANNUAL HEALTH EXAMINATION: Primary | ICD-10-CM

## 2025-07-24 PROBLEM — L57.8 SUN-DAMAGED SKIN: Status: RESOLVED | Noted: 2017-05-30 | Resolved: 2025-07-24

## 2025-07-24 PROBLEM — Z86.018 HISTORY OF DYSPLASTIC NEVUS: Status: RESOLVED | Noted: 2020-03-03 | Resolved: 2025-07-24

## 2025-07-24 PROCEDURE — G0439 PPPS, SUBSEQ VISIT: HCPCS | Performed by: FAMILY MEDICINE

## 2025-07-24 RX ORDER — METOPROLOL SUCCINATE 50 MG/1
50 TABLET, EXTENDED RELEASE ORAL DAILY
Qty: 90 TABLET | Refills: 1 | Status: SHIPPED | OUTPATIENT
Start: 2025-07-24

## 2025-07-24 RX ORDER — LOSARTAN POTASSIUM 50 MG/1
50 TABLET ORAL DAILY
Qty: 90 TABLET | Refills: 1 | Status: SHIPPED | OUTPATIENT
Start: 2025-07-24

## 2025-07-24 NOTE — PROGRESS NOTES
Subjective:   Romina Delgado is a 79 year old female who presents for a Medicare Subsequent Annual Wellness visit (Pt already had Initial Annual Wellness) and scheduled follow up of multiple significant but stable problems.     Imms- up-to-date with Tdap, both pneumonia, & both shingrix.  Had shingles of the R ear after acoutic neuroma surgery 10/18. Discussed fall flu, RSV & covid.        Cervical cancer screening-  No h/o abnormal paps, hpv, or genital warts. No further paps needed.        H/o R breast cancer- Has a h/o R lumpectomy 2/2 IDC in 2011. No known family history of breast/ovarian cancer. Oncologist, Dr. Jo, does a breast exam yearly. Normal mammogram 4/15/25. Next already ordered by Dr. Jo.        Colon cancer screening-  No family h/o colon cancer. C-scope done 5/17/18, Dr. Max, repeat 10 yrs. No further needed.         Osteoporosis screening- last dexa 9/26/22 showed osteopenia with FRAX of 15/4.2% worsened FRAX 2 yrs in a row. She has been on fosamax and finished 5/18. Decided in 2018 to cont Ca + Vit D, get wt bearing exercise and repeat dexa in 2 yrs.  As it is worse and back in the osteoporotic range sees rheumatology, Dr. Narvaez.  10/4/24 dexa- osteopenia with FRAX 14/3.9% which was improved from her previous so to cont Ca + vit D.       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 L ear doing ok. Has bone anchored chochlear stimulator on the R and working well. Got new hearing aids in 2023 which are connected to her phone, can set up the TV through it as well. Sees audiologist 1/yr.         POA/LW- not on file, thinks she has it, but unsure where it is .     Diet/exercise- eats well.      Dental/Eye Check up-  Recommended pt see dentist once every 6 months for a cleaning and once every year for an eye exam.        HTN- high since Dec. Taking losartan and metoprolol.  No hydrochlorothiazide in the future  b/c has gout. Sees Dr. Narvaez, rheum.  Her cuff was similar to ours on 3/16/21           Aortic stenosis  Dyslidemia  - sees Dr. John Martinez. Last echo 12/10/24 in care everywhere showing moderate stenosis still, mild-mod TR, mild  Can walk up stairs and walks ~2mi/d with her dog without troubles.  LDL 57 on 1/31/25.     Denies- sob, chest pain        IFG- Hba1c 5.8 on 2/9/24. 6.1 on 1/31/25.         TSH- Normal TSH on 1/31/25, Taking levothyroxine .          History/Other:   Fall Risk Assessment:   She has been screened for Falls and is low risk.      Cognitive Assessment:   She had a completely normal cognitive assessment - see flowsheet entries     Functional Ability/Status:   Romina Delgado has some abnormal functions as listed below:  She has Hearing problems based on screening of functional status.      Depression Screening (PHQ):  PHQ-2 SCORE: 0  , done 7/24/2025   Last [place] Suicide Screening on 7/24/2025 was No Risk.          Advanced Directives:   She does have a Living Will but we do NOT have it on file in Epic.    She does have a POA but we do NOT have it on file in [facility].    Patient has Advance Care Planning documents but we do not have a copy in EMR. Discussed Advanced Care Planning with patient and instructed patient to get our office a copy to be scanned into EMR.      Problem List[1]  Allergies:  She is allergic to hydrochlorothiazide.    Current Medications:  Active Meds, Sig Only[2]    Medical History:  She  has a past medical history of Aortic stenosis, Basal cell carcinoma, BCC (basal cell carcinoma of skin) (2017), Breast cancer (HCC), Closed undisplaced fracture of nasal bone (08/16/2021), Cochlear implant in place (02/2020), Dysplastic nevus (2019), Essential hypertension, Exposure to medical diagnostic radiation, Gout, History of postmenopausal HRT, History of right breast cancer (2011), History of shingles (10/2018), Hypogammaglobulinemia (HCC) (11/17/2023), Hypothyroidism,  postradioiodine therapy, IFG (impaired fasting glucose) (03/2023), Keratoacanthoma type squamous cell carcinoma of skin (11/2018), Mixed hyperlipidemia, Osteoarthritis, Osteopenia, Personal history of antineoplastic chemotherapy, Personal history of colonic polyps (2007), PONV (postoperative nausea and vomiting), S/P excision of acoustic neuroma (~2008), and Vitamin D insufficiency (2011).  Surgical History:  She  has a past surgical history that includes colonoscopy (5/11/2007, 2013, 5/17/18); other surgical history; other surgical history (1981); tonsillectomy (1964); other surgical history (11/2013); radiation right (2011); chemotherapy (2011); needle biopsy right (Right, 03/2011); Brain Surgery (02/2008); cystoscopy,insert ureteral stent; skin tissue rearrangement (Left, 01/12/2018); colonoscopy (N/A, 05/17/2018); other (10/11/2018); middle ear surgery proc unlisted (2018); full graft proc nos,ear,lid <20sqcm (Right, 01/28/2020); cochlear implant ext speech (Right, 02/2020); lumpectomy right (04/2011); and kyler localization wire 1 site left (cpt=19281) (1990's).   Family History:  Her family history includes Breast Cancer (age of onset: 65) in her self; Cancer in her father, paternal grandfather, and sister.  Social History:  She  reports that she quit smoking about 44 years ago. Her smoking use included cigarettes. She started smoking about 52 years ago. She has a 4 pack-year smoking history. She has never used smokeless tobacco. She reports current alcohol use. She reports that she does not use drugs.    Tobacco:  She smoked tobacco in the past but quit greater than 12 months ago.  Tobacco Use[3]     CAGE Alcohol Screen:   CAGE screening score of 0 on 7/24/2025, showing low risk of alcohol abuse.      Patient Care Team:  Maya Millan MD as PCP - General  Gordon Narvaez MD (RHEUMATOLOGY)    Review of Systems     Negative except healing nose from skin cancer removal.    Objective:   Physical Exam       BP  122/62   Pulse 64   Temp 97.9 °F (36.6 °C) (Temporal)   Resp 16   Ht 5' 5\" (1.651 m)   Wt 133 lb (60.3 kg)   LMP 01/01/1994   SpO2 98%   BMI 22.13 kg/m²  Estimated body mass index is 22.13 kg/m² as calculated from the following:    Height as of this encounter: 5' 5\" (1.651 m).    Weight as of this encounter: 133 lb (60.3 kg).    GENERAL APPEARANCE:  Alert, no acute distress, appears stated age  HEENT:  Head- Normocephalic, atraumatic.    Eyes- Extraocular movements intact, pupils equally round and reactive to light    and accommodation, conjunctivae normal.    Ears- Tympanic membranes intact bilaterally.    Nose- Patent, normal septum and turbinates.    Mouth/Throat- Normal oral mucosa, throat non-erythematous.  NECK:  No submental, submandibular, ant/post cervical lymphadenopathy. No thyromegaly or masses.  PULMONARY:  Lungs clear to auscultation bilaterally. No wheezes, rales, or rhonchi.  CARDIOVASCULAR:  Regular rate and rhythm. No murmurs, gallops, or rubs.  ABDOMEN:  + bowel sounds, soft, nontender, nondistended. No hepatomegaly.  MUSCULOSKELETAL: Strength of upper and lower extremities 5/5 bilaterally. Normal gait.  NEUROLOGIC:  Cranial nerves 2-12 grossly intact.  PSYCHIATRIC:  Normal mood, affect, and hygiene.    Medicare Hearing Assessment:   Hearing Screening    Time taken: 7/24/2025  2:07 PM  Entry User: Maya Millan MD  Screening Method: Finger Rub  Finger Rub Result: Pass     Has hearing aid B, + cochlear implant.      Visual Acuity:   Right Eye Visual Acuity: Corrected Right Eye Chart Acuity: 20/13   Left Eye Visual Acuity: Corrected Left Eye Chart Acuity: 20/13   Both Eyes Visual Acuity: Corrected Both Eyes Chart Acuity: 20/13   Able To Tolerate Visual Acuity: Yes        Assessment & Plan:   Romina Delgado is a 79 year old female who presents for a Medicare Assessment.     1. Encounter for annual health examination (Primary)  2. History of right breast cancer  Overview:  2011  diagnosis      3. Osteopenia of multiple sites  4. Essential hypertension  -     Losartan Potassium; Take 1 tablet (50 mg total) by mouth daily.  Dispense: 90 tablet; Refill: 1  5. IFG (impaired fasting glucose)  6. Nonrheumatic aortic valve stenosis  Other orders  -     Metoprolol Succinate ER; Take 1 tablet (50 mg total) by mouth daily.  Dispense: 90 tablet; Refill: 1    Assessment & Plan    The patient indicates understanding of these issues and agrees to the plan.  Reinforced healthy diet, lifestyle, and exercise.      Return in about 6 months (around 1/24/2026) for blood pressure check.     Maya Millan MD, 7/24/2025     Supplementary Documentation:   General Health:  In the past six months, have you lost more than 10 pounds without trying?: 2 - No  Has your appetite been poor?: No  Type of Diet: Balanced  How does the patient maintain a good energy level?: Daily Walks  How would you describe your daily physical activity?: Moderate  How do you maintain positive mental well-being?: Social Interaction, Visiting Friends, Visiting Family  On a scale of 0 to 10, with 0 being no pain and 10 being severe pain, what is your pain level?: 0 - (None)  In the past six months, have you experienced urine leakage?: 0-No  At any time do you feel concerned for the safety/well-being of yourself and/or your children, in your home or elsewhere?: No  Have you had any immunizations at another office such as Influenza, Hepatitis B, Tetanus, or Pneumococcal?: No    Health Maintenance   Topic Date Due    COVID-19 Vaccine (4 - 2024-25 season) 09/01/2024    Annual Physical  07/16/2025    Influenza Vaccine (1) 10/01/2025    DEXA Scan  Completed    Annual Depression Screening  Completed    Fall Risk Screening (Annual)  Completed    Pneumococcal Vaccine: 50+ Years  Completed    Zoster Vaccines  Completed    Meningococcal B Vaccine  Aged Out    Mammogram  Discontinued    Colonoscopy  Discontinued            [1]   Patient Active  Problem List  Diagnosis    Iron deficiency anemia, unspecified    Aortic stenosis    History of right breast cancer    Congenital nevus of left thigh    Personal history of skin cancer    Hypothyroidism, postradioiodine therapy    S/P excision of acoustic neuroma    Vitamin D insufficiency    Osteopenia of multiple sites    Deafness in right ear    Keratoacanthoma    Essential hypertension    Rosacea    Gout of right hand    Positive COLT (antinuclear antibody)    Erosive osteoarthritis    History of actinic keratoses    SS-A antibody positive    IFG (impaired fasting glucose)    Mixed hyperlipidemia    Hypogammaglobulinemia (HCC)   [2]   Outpatient Medications Marked as Taking for the 25 encounter (Office Visit) with Maya Millan MD   Medication Sig    losartan 50 MG Oral Tab Take 1 tablet (50 mg total) by mouth daily.    metoprolol succinate ER 50 MG Oral Tablet 24 Hr Take 1 tablet (50 mg total) by mouth daily.    levothyroxine 100 MCG Oral Tab TAKE ONE TABLET BY MOUTH ONE TIME DAILY    atorvastatin 10 MG Oral Tab TAKE ONE TABLET BY MOUTH NIGHTLY    allopurinol 300 MG Oral Tab Take 1 tablet (300 mg total) by mouth daily.    zolpidem 10 MG Oral Tab Take 1 tablet (10 mg total) by mouth in the morning.    Calcium Carb-Cholecalciferol (CALCIUM 500 + D3 OR) Take 2 tablets by mouth in the morning.    aspirin 81 MG Oral Tab EC Take 1 tablet (81 mg total) by mouth in the morning.    Melatonin 5 MG Oral Tab Take 1 tablet (5 mg total) by mouth nightly.    Multiple Minerals-Vitamins Oral Chew Tab Chew 1 tablet by mouth in the morning.   [3]   Social History  Tobacco Use   Smoking Status Former    Current packs/day: 0.00    Average packs/day: 0.5 packs/day for 8.0 years (4.0 ttl pk-yrs)    Types: Cigarettes    Start date: 1972    Quit date: 1980    Years since quittin.8   Smokeless Tobacco Never   Tobacco Comments    quit 30 years ago

## (undated) DEVICE — SUTURE VICRYL 2-0 FS-1

## (undated) DEVICE — PROXIMATE RH ROTATING HEAD SKIN STAPLERS (35 WIDE) CONTAINS 35 STAINLESS STEEL STAPLES: Brand: PROXIMATE

## (undated) DEVICE — 6 ML SYRINGE LUER-LOCK TIP: Brand: MONOJECT

## (undated) DEVICE — DRAPE SRG 50X36IN HD TRBN STRL

## (undated) DEVICE — GOWN SURG AERO BLUE PERF XLG

## (undated) DEVICE — SUTURE SILK 4-0 P-3

## (undated) DEVICE — SOL  .9 1000ML BTL

## (undated) DEVICE — SUTURE ETHILON 4-0 699G

## (undated) DEVICE — TOWEL OR BLU 16X26 STRL

## (undated) DEVICE — SUTURE PLAIN GUT 5-0 PC-1

## (undated) DEVICE — INTENDED FOR TISSUE SEPARATION, AND OTHER PROCEDURES THAT REQUIRE A SHARP SURGICAL BLADE TO PUNCTURE OR CUT.: Brand: BARD-PARKER ® STAINLESS STEEL BLADES

## (undated) DEVICE — SUTURE VICRYL 4-0 J494G

## (undated) DEVICE — OCCLUSIVE GAUZE STRIP,3% BISMUTH TRIBROMOPHENATE IN PETROLATUM BLEND: Brand: XEROFORM

## (undated) DEVICE — DRAPE SHEET LG

## (undated) DEVICE — PEN 6\" SURGICAL MARKING PURPL

## (undated) DEVICE — GAMMEX® PI HYBRID SIZE 7, STERILE POWDER-FREE SURGICAL GLOVE, POLYISOPRENE AND NEOPRENE BLEND: Brand: GAMMEX

## (undated) DEVICE — CASED DISP BIPOLAR CORD

## (undated) DEVICE — GOWN SURG AERO BLUE PERF LG

## (undated) DEVICE — LOWER EXTREMITY: Brand: MEDLINE INDUSTRIES, INC.

## (undated) DEVICE — 3M™ STERI-STRIP™ REINFORCED ADHESIVE SKIN CLOSURES, R1548, 1 IN X 5 IN (25 MM X 125 MM), 4 STRIPS/ENVELOPE: Brand: 3M™ STERI-STRIP™

## (undated) DEVICE — CLIPPER BLADE 3M

## (undated) DEVICE — HEX-LOCKING BLADE ELECTRODE: Brand: EDGE

## (undated) DEVICE — STANDARD HYPODERMIC NEEDLE,POLYPROPYLENE HUB: Brand: MONOJECT

## (undated) DEVICE — OUTPATIENT: Brand: MEDLINE INDUSTRIES, INC.

## (undated) DEVICE — STERILE LATEX POWDER-FREE SURGICAL GLOVESWITH NITRILE COATING: Brand: PROTEXIS

## (undated) DEVICE — X-RAY DETECTABLE SPONGES,16 PLY: Brand: VISTEC

## (undated) NOTE — LETTER
02/07/19        [address] [place] [id number]      Dear Miryam Martinez,    [facility] records indicate that you have outstanding lab work and or testing that was ordered for you and has not yet been completed:  Orders Placed This [place]

## (undated) NOTE — MR AVS SNAPSHOT
After Visit Summary   3/31/2017    Romero Tamez    MRN: [id number]           Diagnoses this Visit     Malignant neoplasm of right female breast, unspecified site of breast [facility])    -  Primary       Allergies     No Known Allergies      Your Vital Summaries. If you've been to the Emergency Department or your doctor's office, you can view your past visit information in [facility] by going to Visits < Visit Summaries. [facility] questions? Call [phone] for help.   [facility] is NOT to be used for urge

## (undated) NOTE — Clinical Note
06/07/2017        [address] [place] [address]  [phone]      Dear Judith Blankenship,    [facility] records indicate that you have outstanding lab work and or testing that was ordered for you and has not yet been completed:      COMP METABOLIC PA

## (undated) NOTE — MR AVS SNAPSHOT
[facility] [address] [place] [address] [address] [facility]  [phone] [id number] [phone]               Thank you for choosing us for your health care visit with Diann May DO.   We are glad to serve you and happy to provide you wi Elevated serum globulin level          Instructions and Information about Your Health      High Blood Pressure, New, Begin Treatment  Your blood pressure was high enough today to start treatment with medicines.  Often health care providers don’t know what hard. Even brisk walking for 20 minutes 3 times a week is a good form of exercise. · Don’t take medicines that have heart stimulants. This includes many cold and sinus decongestant pills and sprays, as well as diet pills.  Check the warnings about hyperten © 9660-6067 [address], [address]. All rights reserved. This information is not intended as a substitute for professional medical care. Always follow your healthcare professional's instructions.              [employer] questions? Call [phone] for help. [employer] is NOT to be used for urgent needs. For medical emergencies, dial 911.            Visit [facility] online at  [email or web address]

## (undated) NOTE — ED AVS SNAPSHOT
Marquez Gallardo   MRN: [id number]    Department:  [facility] Emergency Department   Date of Visit:  1/30/2020           Disclosure     Insurance plans vary and the physician(s) referred by the ER may not be covered by your plan.  Please contact you tell this physician (or your personal doctor if your instructions are to return to your personal doctor) about any new or lasting problems. The primary care or specialist physician will see patients referred from the [facility] Emergency Department.  Kareen Gutierrez

## (undated) NOTE — LETTER
17      Patient: Amadou Melendez  : 1945 Visit date: 2017    Dear Don Hooveran,      I examined your patient in consultation today. She has a congenital nevus of the left lateral thigh. We will plan on wide excision.   She wi

## (undated) NOTE — MR AVS SNAPSHOT
[facility] [place] [address] [place]  [id number] [phone]               Thank you for choosing us for your health care visit with Latasha Tamez MD.  We are glad to serve you and happy to provide you with this summary of y atorvastatin 10 MG Tabs   TAKE ONE TABLET BY MOUTH ONCE NIGHTLY   Commonly known as:  LIPITOR           Calcium 500 MG Chew   Chew  by mouth daily.            Fluocinonide 0.05 % Oint   Apply to affected areas 1-2x/day as needed- for leg   Commonly known a

## (undated) NOTE — LETTER
5/11/2021        May 11, 2021      Tung Tabares. [place] [place] [address]  Fax: [phone]    Dear Dr. Morales Level: Thank you for referring your patient to me for an evaluation.  Please see my attached note for my find pains besides in hands. Denies gout history. Occasional rashes on legs. Thinks had some contact dermatitis from detergent. +dry eye: worse at night.  Son-in-law is an ophthalmologist. Has received dry eye testing in the past. Uses ATs.  +dry mouth: q 2 yrs   • BCC (basal cell carcinoma of skin) 2017    left nasal rim   • Cochlear implant in place 02/2020    R side   • Dysplastic nevus 2019    right ear pinna, Full Thickness Skin Graft to right post auricular    • Essential hypertension    • Exposure HISTORY  2/2008    ACCUSTIC NEUROMA, Craniotomy excision   • OTHER SURGICAL HISTORY      cystoscopy (diagnostic)   • OTHER SURGICAL HISTORY  1981    thyroid surgery, 1981-radioactive iodine ablation   • OTHER SURGICAL HISTORY  Nov. 2013    abutment placed performed. No evidence of synovitis in mcp, pip, dip, wrist, elbows, shoulders, hips, knees, ankles, mtp unless otherwise noted. Full ROM of elbows, wrists, knees. Hand OA changes.    Significant bony hypertrophy vs bouchards nodes at PIPsof R > L hands Lab Results   Component Value Date    DSDNA <100 11/13/2012    SMUD <100 11/13/2012    RNP <100 11/13/2012     Lab Results   Component Value Date    SCL70 <100 11/13/2012     No results found for: C3, C4  No results found for: DRVVT, LAINT, PTTLUPUS, LUPUS =====================================================================================================  Assessment and Plan    Assessment:  Positive COLT (antinuclear antibody)  (primary encounter diagnosis)  Rheumatoid factor positive  Therapeutic drug leslye daily for hand OA. Reading on anti-inflammatory diet. ?  In terms of history of osteopenia with with elevated risk of fracture. She was put on alendronate from 2011 to 2018. Has been on a bisphosphonate holiday since then.   Trending bone mineral densit Osteopenia of multiple sites  -     MAGNESIUM  -     PHOSPHORUS    Other orders  -     Cancel: QUANTIFERON TB GOLD (IN TUBE)  -     Cancel: HCV ANTIBODY  -     Cancel: HEPATITIS B SURFACE ANTIGEN  -     Cancel: HEPATITIS B SURFACE ANTIBODY  -     Cancel: H

## (undated) NOTE — LETTER
20      Patient: Jazmin Aguirre  : 1945 Visit date: 2020    Dear Aden Amor,      I examined your patient in follow-up today.     She is 3 weeks post wide excision of dysplastic nevus of the right ear, with full-thickness skin

## (undated) NOTE — MR AVS SNAPSHOT
[facility] [address] [place] [address] [address] [facility]  Harish Hinds [phone] [id number]  [phone]               Thank you for choosing us for your health care visit with Marquis Usha DO.   We are glad to serve you and happy to provide you wi from an infection with the hepatitis A virus. This vaccine does not contain the live virus. It will not cause a hepatitis infection. This vaccine is also used with immunoglobulin to prevent infection in people who have been exposed to hepatitis A.   How tammie · an unusual or allergic reaction to hepatitis A vaccine, latex, albumin, other medicines, foods, dyes, or preservatives  · pregnant or trying to get pregnant  · breast-feeding  What should I watch for while using this medicine?   See your health care provi · Think ahead. Pack healthy snacks that you can carry through security. Most of the items listed above can be packed in your carry-on bag. · If you are not able to pack healthy options, buy healthy food at the terminal and bring it on the plane.  In most a healthcare provider before traveling. This is because [place] can cause severe birth defects in a baby when a pregnant woman has [place].   Areas where [place] is active  [place] is currently active in these areas:                                                        or other venue, choose places with screens in windows and doors, or air conditioning. · Wear your protective clothes. When you go outside, wear clothes that covers your body to prevent mosquito bites. Choose long sleeves and long pant legs.  Wear socks and substitute for professional medical care. Always follow your healthcare professional's instructions. Traveler's Diarrhea (Adult)    Traveler's diarrhea is an infection in the intestinal tract caused by bacteria called E coli.  This bacteria is common · Hot cereal, plain toast, bread, rolls, crackers  · Plain noodles, rice, mashed potatoes, chicken noodle or rice soup  · Unsweetened canned fruit (avoid pineapple), bananas  · Limit fat intake to less than 15 grams per day by avoiding margarine, butter, o BP Pulse Temp Height Weight BMI    122/76 mmHg 81 97.9 °F (36.6 °C) (Temporal) 65\" 135 lb 22.47 kg/m2         Current Medications          This list is accurate as of: 2/21/17 11:59 PM.  Always use your most recent med list.                Alendronate So Bring a paper prescription for each of these medications    - diphenoxylate-atropine 2.5-0.025 MG Tabs            Immunizations Administered in the Office Today     HEP A       Dayanhart     Sign up for [employer], your secure online medical record.   Sarah heller HOW TO GET STARTED: HOW TO STAY MOTIVATED:   Start activities slowly and build up over time Do what you like   Get your heart pumping – brisk walking, biking, swimming Even 10 minute increments are effective and add up over the week   2 ½ hours per week –

## (undated) NOTE — Clinical Note
Hi Dr. Seferino Hinojosa,    If hydrochlorothiazide could be stopped in the future uric acid could decrease. Patient wanted to stop urate lower therapy in the future if at all possible and wanted to discuss this at the next appointment.  Losartan can decrease uric ac

## (undated) NOTE — Clinical Note
Hi Dr. 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 I think she is more amenable to pharmacologic management but will discuss with you at next appointment.   Thanks, [employer]

## (undated) NOTE — LETTER
20      Patient: Shruti Solis  : 1945 Visit date: 2020    Dear Alvin Bernheim,      I examined your patient in consultation today. She has a moderate to severely dysplastic nevus of the right ear pinna.   We will plan on wide

## (undated) NOTE — Clinical Note
Please investigate and see if febuxostat 80 mg/month co-pay can be decreased.   Putting on allopurinol for now because the cost is prohibitive; but she may have had a side effect with this in the past.    Also please fax Sept 2021 labs to her [facility] to